# Patient Record
Sex: FEMALE | Race: WHITE | NOT HISPANIC OR LATINO | Employment: FULL TIME | URBAN - METROPOLITAN AREA
[De-identification: names, ages, dates, MRNs, and addresses within clinical notes are randomized per-mention and may not be internally consistent; named-entity substitution may affect disease eponyms.]

---

## 2023-02-20 LAB — PROLACTIN (UG/L) IN SER/PLAS: 19.1 UG/L (ref 3–20)

## 2023-03-15 LAB
CLUE CELLS: NORMAL
NUGENT SCORE: 0
YEAST: NORMAL

## 2023-04-19 DIAGNOSIS — F98.8 ATTENTION DEFICIT DISORDER, UNSPECIFIED HYPERACTIVITY PRESENCE: Primary | ICD-10-CM

## 2023-04-19 PROBLEM — I87.2 CHRONIC VENOUS INSUFFICIENCY: Status: ACTIVE | Noted: 2023-04-19

## 2023-04-19 PROBLEM — H93.A1 OBJECTIVE PULSATILE TINNITUS OF RIGHT EAR: Status: ACTIVE | Noted: 2023-04-19

## 2023-04-19 PROBLEM — F32.81 PREMENSTRUAL DYSPHORIC SYNDROME: Status: ACTIVE | Noted: 2023-04-19

## 2023-04-19 PROBLEM — L70.0 ACNE VULGARIS: Status: ACTIVE | Noted: 2023-04-19

## 2023-04-19 PROBLEM — G56.21 ULNAR NEUROPATHY AT ELBOW, RIGHT: Status: ACTIVE | Noted: 2023-04-19

## 2023-04-19 PROBLEM — R29.2 HYPER REFLEXIA: Status: ACTIVE | Noted: 2023-04-19

## 2023-04-19 PROBLEM — E06.3 HASHIMOTO'S THYROIDITIS: Status: ACTIVE | Noted: 2023-04-19

## 2023-04-19 PROBLEM — E28.8 HYPERANDROGENISM: Status: ACTIVE | Noted: 2023-04-19

## 2023-04-19 PROBLEM — N92.6 IRREGULAR MENSES: Status: ACTIVE | Noted: 2023-04-19

## 2023-04-19 PROBLEM — R55 SYNCOPE, NEAR: Status: ACTIVE | Noted: 2023-04-19

## 2023-04-19 PROBLEM — J45.20 MILD INTERMITTENT ASTHMA WITHOUT COMPLICATION (HHS-HCC): Status: ACTIVE | Noted: 2023-04-19

## 2023-04-19 PROBLEM — E55.9 VITAMIN D DEFICIENCY: Status: ACTIVE | Noted: 2023-04-19

## 2023-04-19 PROBLEM — R73.03 PREDIABETES: Status: ACTIVE | Noted: 2023-04-19

## 2023-04-19 PROBLEM — M06.09 POLYARTHRITIS WITH NEGATIVE RHEUMATOID FACTOR (MULTI): Status: ACTIVE | Noted: 2023-04-19

## 2023-04-19 PROBLEM — M79.18 MYOFASCIAL PAIN: Status: ACTIVE | Noted: 2023-04-19

## 2023-04-19 PROBLEM — K21.9 GERD (GASTROESOPHAGEAL REFLUX DISEASE): Status: ACTIVE | Noted: 2023-04-19

## 2023-04-19 PROBLEM — H16.229 KERATOCONJUNCTIVITIS SICCA NOT SPECIFIED AS SJOGREN'S: Status: ACTIVE | Noted: 2023-04-19

## 2023-04-19 PROBLEM — R43.9 DISTURBANCE OF SMELL AND TASTE: Status: ACTIVE | Noted: 2023-04-19

## 2023-04-19 PROBLEM — D89.89 AUTOIMMUNE DISORDER (MULTI): Status: ACTIVE | Noted: 2023-04-19

## 2023-04-19 PROBLEM — M48.02 CERVICAL STENOSIS OF SPINE: Status: ACTIVE | Noted: 2023-04-19

## 2023-04-19 PROBLEM — M24.9 HYPERMOBILE JOINTS: Status: ACTIVE | Noted: 2023-04-19

## 2023-04-19 PROBLEM — R79.82 CRP ELEVATED: Status: ACTIVE | Noted: 2023-04-19

## 2023-04-19 PROBLEM — E04.9 ENLARGED THYROID: Status: ACTIVE | Noted: 2023-04-19

## 2023-04-19 PROBLEM — A60.00 GENITAL HERPES SIMPLEX TYPE 2: Status: ACTIVE | Noted: 2023-04-19

## 2023-04-19 PROBLEM — M25.562 LEFT KNEE PAIN: Status: ACTIVE | Noted: 2023-04-19

## 2023-04-19 PROBLEM — J30.9 ALLERGIC RHINITIS: Status: ACTIVE | Noted: 2023-04-19

## 2023-04-19 PROBLEM — R20.3 SENSITIVE SKIN: Status: ACTIVE | Noted: 2023-04-19

## 2023-04-19 PROBLEM — K58.9 IBS (IRRITABLE BOWEL SYNDROME): Status: ACTIVE | Noted: 2023-04-19

## 2023-04-19 PROBLEM — M35.9 UNDIFFERENTIATED CONNECTIVE TISSUE DISEASE (MULTI): Status: ACTIVE | Noted: 2023-04-19

## 2023-04-19 PROBLEM — I49.9 IRREGULAR HEART BEATS: Status: ACTIVE | Noted: 2023-04-19

## 2023-04-19 PROBLEM — Q79.62 HYPERMOBILE EHLERS-DANLOS SYNDROME (HHS-HCC): Status: ACTIVE | Noted: 2023-04-19

## 2023-04-19 PROBLEM — L68.0 HIRSUTISM: Status: ACTIVE | Noted: 2023-04-19

## 2023-04-19 PROBLEM — H52.13 BILATERAL MYOPIA: Status: ACTIVE | Noted: 2023-04-19

## 2023-04-19 PROBLEM — R29.2: Status: ACTIVE | Noted: 2023-04-19

## 2023-04-19 PROBLEM — Z15.89 MONOALLELIC MUTATION OF MEFV GENE: Status: ACTIVE | Noted: 2023-04-19

## 2023-04-19 PROBLEM — L81.9 DISCOLORATION OF SKIN OF HAND: Status: ACTIVE | Noted: 2023-04-19

## 2023-04-19 PROBLEM — J45.909 ASTHMA (HHS-HCC): Status: ACTIVE | Noted: 2023-04-19

## 2023-04-19 PROBLEM — F44.4 FUNCTIONAL NEUROLOGICAL SYMPTOM DISORDER WITH WEAKNESS OR PARALYSIS: Status: ACTIVE | Noted: 2023-04-19

## 2023-04-19 PROBLEM — G56.01 RIGHT CARPAL TUNNEL SYNDROME: Status: ACTIVE | Noted: 2023-04-19

## 2023-04-19 RX ORDER — SULFASALAZINE 500 MG/1
1 TABLET ORAL 2 TIMES DAILY
COMMUNITY
Start: 2022-12-15 | End: 2024-01-16

## 2023-04-19 RX ORDER — CHOLECALCIFEROL (VITAMIN D3) 50 MCG
2000 TABLET ORAL DAILY
COMMUNITY

## 2023-04-19 RX ORDER — CELECOXIB 400 MG/1
400 CAPSULE ORAL
COMMUNITY

## 2023-04-19 RX ORDER — DEXTROAMPHETAMINE SACCHARATE, AMPHETAMINE ASPARTATE, DEXTROAMPHETAMINE SULFATE AND AMPHETAMINE SULFATE 3.75; 3.75; 3.75; 3.75 MG/1; MG/1; MG/1; MG/1
1 TABLET ORAL DAILY
Qty: 30 TABLET | Refills: 0 | Status: SHIPPED | OUTPATIENT
Start: 2023-04-19 | End: 2023-05-19

## 2023-04-19 RX ORDER — LEVOTHYROXINE SODIUM 75 UG/1
75 TABLET ORAL DAILY
COMMUNITY
End: 2023-11-09 | Stop reason: SDUPTHER

## 2023-04-19 RX ORDER — VALACYCLOVIR HYDROCHLORIDE 500 MG/1
1 TABLET, FILM COATED ORAL 2 TIMES DAILY PRN
COMMUNITY
Start: 2021-04-05

## 2023-04-19 RX ORDER — DEXTROAMPHETAMINE SACCHARATE, AMPHETAMINE ASPARTATE, DEXTROAMPHETAMINE SULFATE AND AMPHETAMINE SULFATE 3.75; 3.75; 3.75; 3.75 MG/1; MG/1; MG/1; MG/1
1 TABLET ORAL DAILY
COMMUNITY
Start: 2023-02-07 | End: 2023-04-19 | Stop reason: SDUPTHER

## 2023-04-19 NOTE — PROGRESS NOTES
Patient called asking for refill on medication(s).  Patient is requesting the following refill(s):  Adderall 15 mg daily   OARRS was reviewed on:  4/19/2023  Urine Drug Screen completed on  12/15/2022;  results were as expected.   C/S Agreement UTD    Refill sent to pharmacy on file

## 2023-04-25 ENCOUNTER — LAB (OUTPATIENT)
Dept: LAB | Facility: LAB | Age: 38
End: 2023-04-25
Payer: COMMERCIAL

## 2023-04-26 LAB — TOBACCO SCREEN, URINE: NEGATIVE

## 2023-04-27 ENCOUNTER — OFFICE VISIT (OUTPATIENT)
Dept: PRIMARY CARE | Facility: CLINIC | Age: 38
End: 2023-04-27
Payer: COMMERCIAL

## 2023-04-27 VITALS
WEIGHT: 158 LBS | OXYGEN SATURATION: 98 % | HEART RATE: 81 BPM | DIASTOLIC BLOOD PRESSURE: 77 MMHG | SYSTOLIC BLOOD PRESSURE: 117 MMHG | BODY MASS INDEX: 30.86 KG/M2

## 2023-04-27 DIAGNOSIS — F90.9 ATTENTION DEFICIT HYPERACTIVITY DISORDER (ADHD), UNSPECIFIED ADHD TYPE: ICD-10-CM

## 2023-04-27 DIAGNOSIS — Q79.62 HYPERMOBILE EHLERS-DANLOS SYNDROME (HHS-HCC): ICD-10-CM

## 2023-04-27 DIAGNOSIS — K21.9 GASTROESOPHAGEAL REFLUX DISEASE WITHOUT ESOPHAGITIS: ICD-10-CM

## 2023-04-27 DIAGNOSIS — Z00.00 ANNUAL PHYSICAL EXAM: Primary | ICD-10-CM

## 2023-04-27 DIAGNOSIS — Z79.899 HIGH RISK MEDICATION USE: ICD-10-CM

## 2023-04-27 DIAGNOSIS — R29.2 HYPER REFLEXIA: ICD-10-CM

## 2023-04-27 PROBLEM — Z86.39 HISTORY OF HASHIMOTO THYROIDITIS: Status: ACTIVE | Noted: 2022-02-23

## 2023-04-27 PROBLEM — J30.2 SEASONAL ALLERGIES: Status: ACTIVE | Noted: 2023-04-27

## 2023-04-27 PROBLEM — H93.A1 PULSATILE TINNITUS OF RIGHT EAR: Status: ACTIVE | Noted: 2023-04-27

## 2023-04-27 PROBLEM — N89.8 VAGINAL DISCHARGE: Status: ACTIVE | Noted: 2023-04-27

## 2023-04-27 PROBLEM — K64.9 HEMORRHOIDS: Status: ACTIVE | Noted: 2023-04-27

## 2023-04-27 PROCEDURE — 80324 DRUG SCREEN AMPHETAMINES 1/2: CPT

## 2023-04-27 PROCEDURE — 1036F TOBACCO NON-USER: CPT | Performed by: NURSE PRACTITIONER

## 2023-04-27 PROCEDURE — 80307 DRUG TEST PRSMV CHEM ANLYZR: CPT

## 2023-04-27 PROCEDURE — 99213 OFFICE O/P EST LOW 20 MIN: CPT | Performed by: NURSE PRACTITIONER

## 2023-04-27 PROCEDURE — 99395 PREV VISIT EST AGE 18-39: CPT | Performed by: NURSE PRACTITIONER

## 2023-04-27 RX ORDER — CERTOLIZUMAB PEGOL 200 MG/ML
400 INJECTION, SOLUTION SUBCUTANEOUS
COMMUNITY
End: 2024-03-14 | Stop reason: SDUPTHER

## 2023-04-27 RX ORDER — METHOCARBAMOL 500 MG/1
TABLET, FILM COATED ORAL
COMMUNITY
Start: 2023-01-03

## 2023-04-27 NOTE — PATIENT INSTRUCTIONS
Thank you for seeing me today.  It was a pleasure to see you again!    Today we did your Annual Physical Exam and discussed the following:     UDS today    CSA updated today    Continue all medications    RTC AS NEEDED

## 2023-04-27 NOTE — PROGRESS NOTES
"Subjective   Chief Complaint   Patient presents with    Follow-up     ELMA IS HERE TODAY FOR ROUTINE F/U AND MEDICATION COMPLIANCE.        Patient ID: Elma Cleveland is a 37 y.o. female who presents for Follow-up (ELMA IS HERE TODAY FOR ROUTINE F/U AND MEDICATION COMPLIANCE. )    HPI  Est 36 yo female presents today for 3 mo f/u on ADD and CPE     Pt currently taking Adderall 15 mg daily (felt 20 mg was too much)   UDS due today  CSA updated today online     Pt went GYN last month  Was cleared by \"high \"    Pt will be doing labs for Endo next month    Pt seeing Dr. Zuleta, had injection 2 days ago     Pt seeing neurology,     Review of Systems  All 13 systems were reviewed and are within normal limits except positive and pertinent negative responses which are noted below or in HPI.      Objective   /77   Pulse 81   Wt 71.7 kg (158 lb)   SpO2 98%   BMI 30.86 kg/m²        Physical Exam  Vitals reviewed.   HENT:      Mouth/Throat:      Mouth: Mucous membranes are moist.   Eyes:      Conjunctiva/sclera: Conjunctivae normal.   Cardiovascular:      Pulses: Normal pulses.   Pulmonary:      Effort: Pulmonary effort is normal.   Abdominal:      General: Bowel sounds are normal.   Skin:     General: Skin is warm and dry.      Capillary Refill: Capillary refill takes less than 2 seconds.   Neurological:      Mental Status: She is alert.   Psychiatric:         Mood and Affect: Mood normal.         Assessment/Plan   Problem List Items Addressed This Visit          Other    ADD (attention deficit disorder) - Primary    Relevant Orders    Drug Screen, Urine With Reflex to Confirmation     Other Visit Diagnoses       High risk medication use        Relevant Orders    Amphetamine Confirm, Urine    Drug Screen, Urine With Reflex to Confirmation            "

## 2023-04-28 LAB
AMPHETAMINE (PRESENCE) IN URINE BY SCREEN METHOD: ABNORMAL
BARBITURATES PRESENCE IN URINE BY SCREEN METHOD: ABNORMAL
BENZODIAZEPINE (PRESENCE) IN URINE BY SCREEN METHOD: ABNORMAL
CANNABINOIDS IN URINE BY SCREEN METHOD: ABNORMAL
COCAINE (PRESENCE) IN URINE BY SCREEN METHOD: ABNORMAL
DRUG SCREEN COMMENT URINE: ABNORMAL
FENTANYL URINE: ABNORMAL
METHADONE (PRESENCE) IN URINE BY SCREEN METHOD: ABNORMAL
OPIATES (PRESENCE) IN URINE BY SCREEN METHOD: ABNORMAL
OXYCODONE (PRESENCE) IN URINE BY SCREEN METHOD: ABNORMAL
PHENCYCLIDINE (PRESENCE) IN URINE BY SCREEN METHOD: ABNORMAL

## 2023-05-02 LAB
AMPHETAMINES,URINE: 861 NG/ML
MDA,URINE: <200 NG/ML
MDEA,URINE: <200 NG/ML
MDMA,UR: <200 NG/ML
METHAMPHETAMINE QUANTITATIVE URINE: <200 NG/ML
PHENTERMINE,UR: <200 NG/ML

## 2023-08-30 PROBLEM — J45.909 ASTHMA (HHS-HCC): Status: ACTIVE | Noted: 2023-08-30

## 2023-08-30 PROBLEM — N92.0 MENORRHAGIA: Status: ACTIVE | Noted: 2023-08-30

## 2023-08-30 PROBLEM — M06.09 RHEUMATOID ARTHRITIS WITHOUT RHEUMATOID FACTOR, MULTIPLE SITES (MULTI): Status: ACTIVE | Noted: 2023-08-30

## 2023-08-30 PROBLEM — O09.529 ANTEPARTUM MULTIGRAVIDA OF ADVANCED MATERNAL AGE (HHS-HCC): Status: ACTIVE | Noted: 2023-08-30

## 2023-08-30 PROBLEM — K40.31: Status: ACTIVE | Noted: 2023-08-30

## 2023-08-30 PROBLEM — M35.9 CONNECTIVE TISSUE DISEASE (MULTI): Status: ACTIVE | Noted: 2023-08-30

## 2023-08-30 RX ORDER — HYDROXYZINE HYDROCHLORIDE 25 MG/1
1 TABLET, FILM COATED ORAL 3 TIMES DAILY PRN
COMMUNITY
Start: 2021-01-19

## 2023-08-30 RX ORDER — CYCLOBENZAPRINE HCL 10 MG
1 TABLET ORAL NIGHTLY
COMMUNITY
Start: 2023-06-06

## 2023-08-30 RX ORDER — THYROID 30 MG/1
30 TABLET ORAL
COMMUNITY

## 2023-08-30 RX ORDER — ATOMOXETINE 18 MG/1
CAPSULE ORAL
COMMUNITY
Start: 2023-06-12 | End: 2023-11-09 | Stop reason: ALTCHOICE

## 2023-08-30 RX ORDER — CELECOXIB 200 MG/1
CAPSULE ORAL
COMMUNITY
End: 2023-11-09

## 2023-08-30 RX ORDER — SPIRONOLACTONE 100 MG/1
100 TABLET, FILM COATED ORAL
COMMUNITY
End: 2023-11-09 | Stop reason: ALTCHOICE

## 2023-08-30 RX ORDER — ERGOCALCIFEROL 1.25 MG/1
1 CAPSULE ORAL
COMMUNITY
Start: 2022-08-15 | End: 2023-11-09 | Stop reason: SDUPTHER

## 2023-08-30 RX ORDER — METOPROLOL SUCCINATE 25 MG/1
25 TABLET, EXTENDED RELEASE ORAL DAILY
COMMUNITY
Start: 2023-07-07 | End: 2023-10-05

## 2023-08-30 RX ORDER — THYROID 15 MG/1
15 TABLET ORAL
COMMUNITY
Start: 2023-07-07 | End: 2023-10-05

## 2023-08-30 RX ORDER — DEXTROAMPHETAMINE SACCHARATE, AMPHETAMINE ASPARTATE MONOHYDRATE, DEXTROAMPHETAMINE SULFATE AND AMPHETAMINE SULFATE 5; 5; 5; 5 MG/1; MG/1; MG/1; MG/1
1 CAPSULE, EXTENDED RELEASE ORAL EVERY MORNING
COMMUNITY
Start: 2022-08-17 | End: 2023-11-09 | Stop reason: ALTCHOICE

## 2023-08-30 RX ORDER — MONTELUKAST SODIUM 10 MG/1
1 TABLET ORAL DAILY
COMMUNITY
End: 2023-11-09 | Stop reason: ALTCHOICE

## 2023-08-30 RX ORDER — MOMETASONE FUROATE 50 UG/1
2 SPRAY, METERED NASAL DAILY
COMMUNITY
End: 2023-11-09 | Stop reason: ALTCHOICE

## 2023-08-30 RX ORDER — LEVALBUTEROL TARTRATE 45 UG/1
2 AEROSOL, METERED ORAL EVERY 6 HOURS
COMMUNITY
Start: 2011-05-16

## 2023-08-30 RX ORDER — ACYCLOVIR 50 MG/G
1 OINTMENT TOPICAL
COMMUNITY

## 2023-08-30 RX ORDER — MOMETASONE FUROATE 50 UG/1
SPRAY, METERED NASAL
COMMUNITY
Start: 2019-09-30

## 2023-08-30 RX ORDER — CETIRIZINE HYDROCHLORIDE 10 MG/1
TABLET ORAL
COMMUNITY
Start: 2023-06-05

## 2023-08-30 RX ORDER — FAMOTIDINE 10 MG/1
TABLET ORAL
COMMUNITY
Start: 2023-06-05

## 2023-08-30 RX ORDER — DEXTROAMPHETAMINE SACCHARATE, AMPHETAMINE ASPARTATE MONOHYDRATE, DEXTROAMPHETAMINE SULFATE AND AMPHETAMINE SULFATE 2.5; 2.5; 2.5; 2.5 MG/1; MG/1; MG/1; MG/1
1 CAPSULE, EXTENDED RELEASE ORAL EVERY MORNING
COMMUNITY
End: 2023-11-09 | Stop reason: ALTCHOICE

## 2023-09-29 ENCOUNTER — HOSPITAL ENCOUNTER (OUTPATIENT)
Dept: DATA CONVERSION | Facility: HOSPITAL | Age: 38
Discharge: HOME | End: 2023-09-29
Payer: COMMERCIAL

## 2023-09-29 DIAGNOSIS — S83.232A COMPLEX TEAR OF MEDIAL MENISCUS, CURRENT INJURY, LEFT KNEE, INITIAL ENCOUNTER: ICD-10-CM

## 2023-10-12 ENCOUNTER — LAB (OUTPATIENT)
Dept: LAB | Facility: LAB | Age: 38
End: 2023-10-12
Payer: COMMERCIAL

## 2023-10-12 DIAGNOSIS — E55.9 VITAMIN D DEFICIENCY, UNSPECIFIED: ICD-10-CM

## 2023-10-12 DIAGNOSIS — R76.8 OTHER SPECIFIED ABNORMAL IMMUNOLOGICAL FINDINGS IN SERUM: ICD-10-CM

## 2023-10-12 DIAGNOSIS — Z00.00 ANNUAL PHYSICAL EXAM: ICD-10-CM

## 2023-10-12 DIAGNOSIS — M06.09 RHEUMATOID ARTHRITIS WITHOUT RHEUMATOID FACTOR, MULTIPLE SITES (MULTI): ICD-10-CM

## 2023-10-12 DIAGNOSIS — Z79.899 OTHER LONG TERM (CURRENT) DRUG THERAPY: Primary | ICD-10-CM

## 2023-10-13 ENCOUNTER — LAB (OUTPATIENT)
Dept: LAB | Facility: LAB | Age: 38
End: 2023-10-13
Payer: COMMERCIAL

## 2023-10-13 PROCEDURE — 81003 URINALYSIS AUTO W/O SCOPE: CPT

## 2023-10-13 PROCEDURE — 86160 COMPLEMENT ANTIGEN: CPT

## 2023-10-13 PROCEDURE — 82550 ASSAY OF CK (CPK): CPT

## 2023-10-13 PROCEDURE — 80053 COMPREHEN METABOLIC PANEL: CPT

## 2023-10-13 PROCEDURE — 36415 COLL VENOUS BLD VENIPUNCTURE: CPT

## 2023-10-13 PROCEDURE — 86256 FLUORESCENT ANTIBODY TITER: CPT

## 2023-10-13 PROCEDURE — 86140 C-REACTIVE PROTEIN: CPT

## 2023-10-13 PROCEDURE — 85025 COMPLETE CBC W/AUTO DIFF WBC: CPT

## 2023-10-13 PROCEDURE — 85652 RBC SED RATE AUTOMATED: CPT

## 2023-10-14 LAB
ALBUMIN SERPL BCP-MCNC: 4.3 G/DL (ref 3.4–5)
ALP SERPL-CCNC: 54 U/L (ref 33–110)
ALT SERPL W P-5'-P-CCNC: 8 U/L (ref 7–45)
ANION GAP SERPL CALC-SCNC: 12 MMOL/L (ref 10–20)
APPEARANCE UR: CLEAR
AST SERPL W P-5'-P-CCNC: 11 U/L (ref 9–39)
BASOPHILS # BLD AUTO: 0.05 X10*3/UL (ref 0–0.1)
BASOPHILS NFR BLD AUTO: 0.8 %
BILIRUB SERPL-MCNC: 0.4 MG/DL (ref 0–1.2)
BILIRUB UR STRIP.AUTO-MCNC: NEGATIVE MG/DL
BUN SERPL-MCNC: 16 MG/DL (ref 6–23)
C3 SERPL-MCNC: 120 MG/DL (ref 87–200)
C4 SERPL-MCNC: 25 MG/DL (ref 10–50)
CALCIUM SERPL-MCNC: 9.7 MG/DL (ref 8.6–10.6)
CHLORIDE SERPL-SCNC: 102 MMOL/L (ref 98–107)
CK SERPL-CCNC: 48 U/L (ref 0–215)
CO2 SERPL-SCNC: 28 MMOL/L (ref 21–32)
COLOR UR: YELLOW
CREAT SERPL-MCNC: 0.9 MG/DL (ref 0.5–1.05)
CRP SERPL-MCNC: 0.39 MG/DL
EOSINOPHIL # BLD AUTO: 0.04 X10*3/UL (ref 0–0.7)
EOSINOPHIL NFR BLD AUTO: 0.6 %
ERYTHROCYTE [DISTWIDTH] IN BLOOD BY AUTOMATED COUNT: 12.1 % (ref 11.5–14.5)
ERYTHROCYTE [SEDIMENTATION RATE] IN BLOOD BY WESTERGREN METHOD: <1 MM/H (ref 0–20)
GFR SERPL CREATININE-BSD FRML MDRD: 85 ML/MIN/1.73M*2
GLUCOSE SERPL-MCNC: 81 MG/DL (ref 74–99)
GLUCOSE UR STRIP.AUTO-MCNC: NEGATIVE MG/DL
HCT VFR BLD AUTO: 42.1 % (ref 36–46)
HGB BLD-MCNC: 13.4 G/DL (ref 12–16)
IMM GRANULOCYTES # BLD AUTO: 0.02 X10*3/UL (ref 0–0.7)
IMM GRANULOCYTES NFR BLD AUTO: 0.3 % (ref 0–0.9)
KETONES UR STRIP.AUTO-MCNC: NEGATIVE MG/DL
LEUKOCYTE ESTERASE UR QL STRIP.AUTO: NEGATIVE
LYMPHOCYTES # BLD AUTO: 2.02 X10*3/UL (ref 1.2–4.8)
LYMPHOCYTES NFR BLD AUTO: 31.8 %
MCH RBC QN AUTO: 32.4 PG (ref 26–34)
MCHC RBC AUTO-ENTMCNC: 31.8 G/DL (ref 32–36)
MCV RBC AUTO: 102 FL (ref 80–100)
MONOCYTES # BLD AUTO: 0.69 X10*3/UL (ref 0.1–1)
MONOCYTES NFR BLD AUTO: 10.9 %
NEUTROPHILS # BLD AUTO: 3.53 X10*3/UL (ref 1.2–7.7)
NEUTROPHILS NFR BLD AUTO: 55.6 %
NITRITE UR QL STRIP.AUTO: NEGATIVE
NRBC BLD-RTO: 0 /100 WBCS (ref 0–0)
PH UR STRIP.AUTO: 6 [PH]
PLATELET # BLD AUTO: 225 X10*3/UL (ref 150–450)
PMV BLD AUTO: 11 FL (ref 7.5–11.5)
POTASSIUM SERPL-SCNC: 3.8 MMOL/L (ref 3.5–5.3)
PROT SERPL-MCNC: 6.6 G/DL (ref 6.4–8.2)
PROT UR STRIP.AUTO-MCNC: NEGATIVE MG/DL
RBC # BLD AUTO: 4.13 X10*6/UL (ref 4–5.2)
RBC # UR STRIP.AUTO: NEGATIVE /UL
SODIUM SERPL-SCNC: 138 MMOL/L (ref 136–145)
SP GR UR STRIP.AUTO: 1.01
UROBILINOGEN UR STRIP.AUTO-MCNC: <2 MG/DL
WBC # BLD AUTO: 6.4 X10*3/UL (ref 4.4–11.3)

## 2023-10-19 LAB — SCAN RESULT: NORMAL

## 2023-10-26 ENCOUNTER — SPECIALTY PHARMACY (OUTPATIENT)
Dept: PHARMACY | Facility: CLINIC | Age: 38
End: 2023-10-26

## 2023-10-26 ENCOUNTER — PHARMACY VISIT (OUTPATIENT)
Dept: PHARMACY | Facility: CLINIC | Age: 38
End: 2023-10-26
Payer: COMMERCIAL

## 2023-10-26 PROCEDURE — RXMED WILLOW AMBULATORY MEDICATION CHARGE

## 2023-11-09 ENCOUNTER — OFFICE VISIT (OUTPATIENT)
Dept: RHEUMATOLOGY | Facility: CLINIC | Age: 38
End: 2023-11-09
Payer: COMMERCIAL

## 2023-11-09 VITALS
HEART RATE: 91 BPM | WEIGHT: 155.2 LBS | DIASTOLIC BLOOD PRESSURE: 62 MMHG | SYSTOLIC BLOOD PRESSURE: 112 MMHG | BODY MASS INDEX: 30.47 KG/M2 | HEIGHT: 60 IN

## 2023-11-09 DIAGNOSIS — L50.9 HIVES: ICD-10-CM

## 2023-11-09 DIAGNOSIS — Q79.62 HYPERMOBILE EHLERS-DANLOS SYNDROME (HHS-HCC): ICD-10-CM

## 2023-11-09 DIAGNOSIS — E55.9 VITAMIN D DEFICIENCY: ICD-10-CM

## 2023-11-09 DIAGNOSIS — M06.09 POLYARTHRITIS WITH NEGATIVE RHEUMATOID FACTOR (MULTI): Primary | ICD-10-CM

## 2023-11-09 DIAGNOSIS — E06.3 HASHIMOTO'S THYROIDITIS: ICD-10-CM

## 2023-11-09 PROCEDURE — 99214 OFFICE O/P EST MOD 30 MIN: CPT | Performed by: INTERNAL MEDICINE

## 2023-11-09 PROCEDURE — 1036F TOBACCO NON-USER: CPT | Performed by: INTERNAL MEDICINE

## 2023-11-09 RX ORDER — METHYLPREDNISOLONE 4 MG/1
TABLET ORAL
Qty: 21 TABLET | Refills: 0 | Status: SHIPPED | OUTPATIENT
Start: 2023-11-09 | End: 2024-03-14

## 2023-11-09 ASSESSMENT — ROUTINE ASSESSMENT OF PATIENT INDEX DATA (RAPID3)
WEIGHTED_TOTAL_SCORE: 2.67
FN_SCORE: 0
ON A SCALE OF ONE TO TEN, CONSIDERING ALL THE WAYS IN WHICH ILLNESS AND HEALTH CONDITIONS MAY AFFECT YOU AT THIS TIME, PLEASE INDICATE BELOW HOW YOU ARE DOING:: 4
SEVERITY_SCORE: 0
TURN_FAUCETS_OFF: WITHOUT ANY DIFFICULTY
IN_OUT_BED: WITHOUT ANY DIFFICULTY
IN_OUT_TRANSPORT: WITHOUT ANY DIFFICULTY
PARTIPATE_RECREATIONAL_ACTIVITIES: WITHOUT ANY DIFFICULTY
DRESS_YOURSELF: WITHOUT ANY DIFFICULTY
FEELINGS_ANXIETY_NERVOUS: WITHOUT ANY DIFFICULTY
WALK_KILOMETERS: WITHOUT ANY DIFFICULTY
LIFT_CUP_TO_MOUTH: WITHOUT ANY DIFFICULTY
ON A SCALE OF ONE TO TEN, HOW MUCH PAIN HAVE YOU HAD BECAUSE OF YOUR CONDITION OVER THE PAST WEEK?: 4
GOOD_NIGHTS_SLEEP: WITHOUT ANY DIFFICULTY
TOTAL RAPID3 SCORE: 8
WASH_DRY_BODY: WITHOUT ANY DIFFICULTY
FEELINGS_DEPRESSION: WITH MUCH DIFFICULTY
PICK_CLOTHES_OFF_FLOOR: WITHOUT ANY DIFFICULTY
ON A SCALE OF ONE TO TEN, CONSIDERING ALL THE WAYS IN WHICH ILLNESS AND HEALTH CONDITIONS MAY AFFECT YOU AT THIS TIME, PLEASE INDICATE BELOW HOW YOU ARE DOING:: 4
ON A SCALE OF ONE TO TEN, HOW MUCH PAIN HAVE YOU HAD BECAUSE OF YOUR CONDITION OVER THE PAST WEEK?: 4
SUM OF QUESTIONS A TO J: 0
WALK_FLAT_GROUND: WITHOUT ANY DIFFICULTY

## 2023-11-09 ASSESSMENT — ENCOUNTER SYMPTOMS
DEPRESSION: 1
LOSS OF SENSATION IN FEET: 0
OCCASIONAL FEELINGS OF UNSTEADINESS: 0

## 2023-11-09 ASSESSMENT — PAIN SCALES - GENERAL: PAINLEVEL_OUTOF10: 4

## 2023-11-09 ASSESSMENT — PATIENT HEALTH QUESTIONNAIRE - PHQ9: 2. FEELING DOWN, DEPRESSED OR HOPELESS: NOT AT ALL

## 2023-11-09 ASSESSMENT — PAIN - FUNCTIONAL ASSESSMENT: PAIN_FUNCTIONAL_ASSESSMENT: 0-10

## 2023-11-09 NOTE — PROGRESS NOTES
[facility]/  Rheumatology  [facility], [address]  [place] [address]  Phone: [phone]  Fax: [phone]    Rheumatology Progress Note 11/9/23    Elma Cleveland is a 38 y.o. female here for   Chief Complaint   Patient presents with    Rheumatoid Arthritis     Follow up with labs       Last Visit: 6/6/23    Rheum Hx  Referred by for: CRP ELEVATED/FOOT PAIN/ JOINT  PAIN/MALAISE AND FATIGUE/NUMBNESS  Patient has seen multiple rheumatologists including Dr. Crespo,  Dr. Caballero, and Dr. Mercado.  Per available records review Dr. Caballero diagnosed patient with  question of undifferentiated connective tissue disease. Patient has  a history of joint pains and high EVELIN at the age of 16 years old.  Found to have RNP slightly elevated. Negative HLA B27, RF, CCP,  Anca, CRP, uric acid, CBC, SPEP, LAC  Patient also saw Dr. Kumar who diagnosed her with chronic  pain, likely undifferentiated connective tissue disease with positive  EVELIN low RNP and Hashimoto's history  It looks a patient has been treated with non steroid antiinflammatory  drug and narcotics.  Patient apparently has also seen Dermatology but  unfortunately could not tolerate the Xyzal. She had a full body rash  with Plaquenil.  Chief complaint: Joint pain, muscle tightness  Since 16 years old  Slow onset  Intermittent course  Precipitating factors: None  Associated symptoms: Pain in joints, night sweats, low-grade  fevers, tiredness  Better: Aleve, sleep  Worse: Stress  Previous treatments naproxen-somewhat helpful  Ibuprofen-somewhat helpful  Meloxicam-no help  Diclofenac-no help  Celecoxib-no help  Acetaminophen-no help  Steroids-very helpful  Plaquenil-discontinued due to rash  Occupation: RN    Previous Rheum note reviewed. Dx with UCTD as a teenager. Low + EVELIN and RNP. Diagnosed by Dr Caballero. Mother Dx sith SLE. Then saw Dr Kumar and Dr Layne.  EVELIN 1:80 2016  Chronic urticaria/Multiple allergies/Asthma  LAD, s/p bx-  benign  PCOS  Benign joint hypermobility.   Hypothyroidism  Since 16 y/o after mono, everything snowballed from there.  Patient has had multiple to and including joint aches, infection  once a year bronchitis that will not go way. States nothing has been  done. 3 days full (not usually) body rash treated with steroids for  over a month.  Usual have rash is high pinpoint nonpruritic. Patient saw  allergist and told she is allergic to life. Patient takes Zyrtec for day  to help with the itchiness and rashes.  Does not have PCOS. Patient cannot take birth control has  makes her sick. Off Aldactone notes horrible acne and cystic acne.  Currently tenderness of bilateral elbows- left elbow is always  the worst, upper spine and lower spine, left knee and cas 3rd 4th  PIPs  No noted swelling  30 minutes to 1 hour of morning stiffness feels muscle  tightness that never seems to go away  Has had deep tissue massage 2-3/wk told very tight knots  She has never tried muscle relaxant or acupuncture.  History of right tennis elbow  Severe back pain more recently tightness lower back and hips  sometimes arms. Notes response in past to Synthroid.  Review of systems positive for:  Fatigue  LN >1 yr- biopsy normal per pt- ?reactive lymphoid  hyperplasia  Dry eyes, constant dry nose  Canker sores not as freq  R ear pulsatile tinnitus/MRA MRI unremarkable; ? scar tissue  +HSV  History of lung disease- Asthma cough variant, not well  controlled  Vocal cord dysfunction due to reflux- Pepcid has almost fixed  it; pantoprazole not working as well; to see GI  Alb incrased heart rate; on Xopenex and alb neb prn  Echo- slight pericardial thickening, ?elevated RVSP; hx of  vasovagal syncope from stomach pain  Gastroesophageal reflux disease  Abdominal pain/ prominent Peyer's patches/ no  gallbladder/dumping syndrome/ Bentyl or Levsin sometimes help  History of kidney stone  Rashes- hormonla; increased DHEAS; no adrenal hyperplasia;  cysts that  come and go; cortisol test done during her night shift  work  Hx of 3 L knee surgeries  Joint pain  Morning stiffness  Back pain that wakes her up from sleep approved upon getting  up and with activity and with rest  Myalgias  Hx of concussion  Weakness right foot- Saw Neuro and did PT up and down; MRI  spine showed herniated disc; EMP upper arm skin couldn't even  feel it.; nystagmus and tiredness  Frequent infections including bacterial vaginosis, trichomonas  History of thyroid disease- +TPO, very symptomatci in 2014  and at TSH level 5; will be following with Endo  Allergies  Depression  Component      Latest Ref Rng 5/22/2018 12/3/2019   Tissue Transglutaminase, IgA      0 - 14  <1     Tissue Transglutamase IgG      0 - 14  <1     DEAMIDATED GLIADIN PEPTIDE IGA      0 - 14  1     DEAMIDATED GLIADIN PEPTIDE IGG      0 - 14  <1     Mononucleosis Screen      NEGATIVE   NEGATIVE      Component      Latest Ref Rng 9/14/2020 12/8/2020   EVELIN      NEGATIVE  POSITIVE !     EVELIN Titer 1:80     EVELIN Pattern HOMOGENEOUS     Anti-SM      AI <0.2     Anti-RNP      AI 2.0 !     Anti-SM/RNP      AI <0.2     Anti-SSA      AI <0.2     Anti-SSB      AI <0.2     Anti-SCL-70      AI <0.2     Anti-RANDY-1 IgG      AI <0.2     Anti-Chromatin      AI <0.2     Anti-Centromere      AI <0.2     ANTI-RIBOSOMAL P      AI <0.2     Anti-DNA (DS)      IU/mL 1.0     C-ANCA (PR3) BY EIA  (Note)…    C-ANCA FLOURESCENCE  Negative…    P-ANCA (MPO) BY EIA  (Note)…    P-ANCA FLOURESCENCE  Negative…    ANCP INTERPRETATION  (Note)…    Staff Review  (Note)…    IgG Cardiolipin Ab  <9.0…    IgM Cardiolipin Ab  20.8… (H)    IgA Cardiolipin Ab  11.2…    Beta-2 Glyco 1 IgG  <9…    Beta 2 Glyco 1 IgM  <9…    Angiotensin 1 Conv  26…    Beta-2 Glyco 1 IgA  1…    Centromere Ab  Negative…      Component      Latest Ref Rng 3/17/2021 8/4/2022   IgG Cardiolipin Ab <9.0…     IgM Cardiolipin Ab 10.1…     IgA Cardiolipin Ab 12.4… (H)     PTT-LA  37.0…    DRVVT  37.1…     Lupus Anticoagulant Interpretation  No LAC   SSA ANTIBODIES <0.2…     SSB ANTIBODIES <0.2…          Previous Tx  naproxen-somewhat helpful  Ibuprofen-somewhat helpful  Meloxicam-no help  Diclofenac-no help  Celecoxib-no help  Acetaminophen-no help  Steroids-very helpful  Plaquenil-discontinued due to rash    Health Maintenance  DXA- none  Component      Latest Ref Rng 4/25/2019 12/3/2019 12/8/2020   Herpes Simplex I IgG <0.2…      HSV 1, IgG Negative…      Herpes Simplex 2 IgG <0.2…      HSV 2, IgG Negative…      HERPES SIMPLEX IGM 0.79…      HSV IgM Qualitative Negative…      Mononucleosis Screen      NEGATIVE   NEGATIVE     Hepatitis B Surface AG      NEG    NEGATIVE    Hepatitis C AB   Negative…    Lyme Antibodies Screen   Negative…    Malignancy Hx- none  Immunization History   Administered Date(s) Administered    AS03 Adjuvant 09/23/2021    Flu vaccine (IIV4), preservative free *Check age/dose* 09/15/2019, 10/01/2020    Flu vaccine, quadrivalent, high-dose, preservative free, age 65y+ (FLUZONE) 09/23/2021    Influenza, Unspecified 10/19/2011, 09/26/2012, 10/04/2013, 11/04/2014, 09/26/2017, 10/20/2022    Influenza, seasonal, injectable 10/22/2013, 10/30/2019    Influenza, trivalent, adjuvanted 10/14/2022    MMR vaccine, subcutaneous (MMR II) 09/01/1998    Moderna COVID-19 vaccine, bivalent, blue cap/gray label *Check age/dose* 11/12/2022    Moderna SARS-CoV-2 Vaccination 05/28/2021, 06/25/2021, 02/04/2022    PPD Test 12/25/2010, 04/04/2011    Tdap vaccine, age 7 year and older (BOOSTRIX) 09/16/2019          Past Medical History:   Diagnosis Date    Acute vaginitis 02/01/2020    Vaginitis and vulvovaginitis    Anesthesia of skin 10/03/2017    Right arm numbness    Carpal tunnel syndrome, left upper limb 12/20/2016    Left carpal tunnel syndrome    Concussion with loss of consciousness of unspecified duration, subsequent encounter     Concussion with loss of consciousness of unspecified duration, subsequent  encounter    Diarrhea, unspecified 11/08/2018    Acute diarrhea    Dry eye syndrome of bilateral lacrimal glands     Insufficiency of tear film of both eyes    Encounter for surveillance of contraceptive pills 04/25/2018    Encounter for birth control pills maintenance    Headache, unspecified 07/05/2019    Mixed headache    Hypo-osmolality and hyponatremia 04/05/2020    Hyponatremia    Immunization not carried out because of patient refusal 07/05/2019    Influenza vaccination declined    Left lower quadrant abdominal tenderness 08/22/2017    Abdominal left lower quadrant tenderness    Left lower quadrant pain 11/17/2016    Colicky LLQ abdominal pain    Other conditions influencing health status 08/22/2017    History of chronic diarrhea    Other intervertebral disc displacement, lumbar region     Herniated lumbar disc without myelopathy    Other malaise 09/10/2020    Malaise and fatigue    Other specified noninflammatory disorders of vagina     Vaginal lesion    Other symptoms and signs involving the musculoskeletal system 10/04/2018    Weakness of right foot    Other symptoms and signs involving the musculoskeletal system 09/28/2018    Weakness of right foot    Other symptoms and signs involving the musculoskeletal system 10/02/2018    Weakness of right lower extremity    Pain in right knee 04/25/2018    Right knee pain    Pain in unspecified foot 09/10/2020    Foot pain    Pelvic and perineal pain 06/05/2020    Pelvic pain    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    Personal history of diseases of the blood and blood-forming organs and certain disorders involving the immune mechanism     History of autoimmune disorder    Personal history of diseases of the skin and subcutaneous tissue 06/10/2016    History of allergic urticaria    Personal history of other benign neoplasm     History of adenoma of adrenal gland    Personal history of other diseases of the  circulatory system 10/25/2016    History of irregular heartbeat    Personal history of other diseases of the digestive system 07/05/2019    History of left inguinal hernia    Personal history of other diseases of the digestive system 02/28/2018    History of hemorrhoids    Personal history of other diseases of the female genital tract 05/22/2018    History of menorrhagia    Personal history of other diseases of the musculoskeletal system and connective tissue 09/02/2020    History of joint pain    Personal history of other diseases of the musculoskeletal system and connective tissue 09/10/2020    History of joint pain    Personal history of other diseases of the respiratory system 02/28/2018    History of acute sinusitis    Personal history of other diseases of the respiratory system     History of asthma    Personal history of other diseases of the respiratory system 12/21/2016    History of acute sinusitis    Personal history of other endocrine, nutritional and metabolic disease 04/06/2020    History of hyperkalemia    Personal history of other infectious and parasitic diseases     History of mononucleosis    Personal history of other specified conditions 07/05/2019    History of nausea    Personal history of other specified conditions 03/27/2019    History of palpitations    Personal history of other specified conditions 08/22/2017    History of urinary frequency    Personal history of other specified conditions 08/12/2021    History of dizziness    Personal history of other specified conditions 07/05/2019    History of headache    Personal history of other specified conditions 09/10/2020    History of numbness    Personal history of other specified conditions 12/30/2016    History of weight gain    Personal history of traumatic brain injury 06/04/2018    History of concussion    Rash and other nonspecific skin eruption 06/10/2016    Rash of unknown cause    Right upper quadrant pain 12/03/2019    Right upper  quadrant abdominal pain    Syncope     Syncope and collapse 02/28/2018    Vasovagal near syncope    Tremor, unspecified 12/19/2016    Tremor of right hand    Unspecified abdominal pain 12/12/2019    Abdominal pain of unknown etiology    Unspecified asthma with (acute) exacerbation 02/28/2018    Acute asthma exacerbation    Unspecified symptoms and signs involving the genitourinary system 12/05/2019    UTI symptoms      Past Surgical History:   Procedure Laterality Date    ADENOIDECTOMY  10/24/2016    Adenoidectomy    GALLBLADDER SURGERY  06/10/2016    Gallbladder Surgery    HERNIA REPAIR  06/10/2016    Hernia Repair    KNEE SURGERY Left 06/10/2016    2001, 2007, 2008    OTHER SURGICAL HISTORY  10/24/2016    Deep Cervical Lymph Node Biopsy    TONSILLECTOMY  10/24/2016    Tonsillectomy      Current Outpatient Medications   Medication Sig Dispense Refill    acyclovir (Zovirax) 5 % ointment 1 Application.      celecoxib (CeleBREX) 400 mg capsule Take 1 capsule (400 mg) by mouth once daily with a meal.      certolizumab pegol (Cimzia) injection Inject 2 mL (400 mg) under the skin every 28 (twenty-eight) days.      cetirizine (ZyrTEC) 10 mg tablet 1 (one) time each day at the same time.      cholecalciferol (Vitamin D-3) 50 MCG (2000 UT) tablet Take 1 tablet (2,000 Units) by mouth once daily.      cyclobenzaprine (Flexeril) 10 mg tablet Take 1 tablet (10 mg) by mouth once daily at bedtime.      famotidine (Pepcid) 10 mg tablet       hydrOXYzine HCL (Atarax) 25 mg tablet Take 1 tablet (25 mg) by mouth 3 times a day as needed for itching.      levalbuterol (Xopenex HFA) 45 mcg/actuation inhaler Inhale 2 puffs every 6 hours.      methocarbamol (Robaxin) 500 mg tablet Take by mouth.      mometasone (Nasonex) 50 mcg/actuation nasal spray Administer into affected nostril(s).      sulfaSALAzine (Azulfidine) 500 mg tablet Take 1 tablet (500 mg) by mouth 2 times a day.      thyroid, pork, (Whick) 30 mg tablet Take 1 tablet (30  mg) by mouth once daily. Take 30 mg by mouth in the morning. Take with 15mg tab daily.      valACYclovir (Valtrex) 500 mg tablet Take 1 tablet (500 mg) by mouth 2 times a day as needed (OUTBREAK).      amphetamine-dextroamphetamine (Adderall) 15 mg tablet Take 1 tablet (15 mg) by mouth once daily. 30 tablet 0    methylPREDNISolone (Medrol Dospak) 4 mg tablets Follow schedule on package instructions 21 tablet 0    metoprolol succinate XL (Toprol-XL) 25 mg 24 hr tablet Take 1 tablet (25 mg) by mouth early in the morning.. Do not crush or chew..      thyroid, pork, 15 mg tablet Take 1 tablet (15 mg) by mouth once daily.  Take 1 tablet (15 mg) by mouth in the morning. Take with the 30 mg tab.       No current facility-administered medications for this visit.      Allergies   Allergen Reactions    Ciprofloxacin Other     Reaction: Vomiting    Hydroxychloroquine Rash     Full body rash    Oxycodone-Acetaminophen Itching     full body itching    Tuberculin Ppd Unknown and Rash     ALLERGY TO BOTH APLISOL AND TUBERSOL CAUSING REDNESS, RASH AND IRRITATION UNDER SKIN.  ANNUAL SCREENING IS A TB GOLD.    Codeine Hives    Gum Wehwno-Wxxvph-Womo-Alcohol Unknown    Adhesive Tape-Silicones Rash    Erythromycin Diarrhea     Patient states she has had zpak and no reaction    Gold Keratinate Hives and Rash    Gold Sodium Thiosulfate Rash    Lidocaine Hives and Rash    Linalool Hives and Rash    Nickel Hives and Rash    Sodium Laureth Sulfate Unknown and Rash    Thimerosal Hives and Rash    Yellow Dye Hives and Rash        Vitals:    11/09/23 0924   BP: 112/62   Pulse: 91   Weight: 70.4 kg (155 lb 3.2 oz)   Height: 1.524 m (5')     Pain Assessment Pain Score: 4, Pain Location: Elbow (hands)     Rapid 3  Function Score (FN): 0  Pain Score (PN) (0-10): 4  Patient Global (PTGL) (0-10): 4  Rapid3 Score: 8      Workup  Component      Latest Ref Rng 10/13/2023   WBC      4.4 - 11.3 x10*3/uL 6.4    nRBC      0.0 - 0.0 /100 WBCs 0.0    RBC       4.00 - 5.20 x10*6/uL 4.13    HEMOGLOBIN      12.0 - 16.0 g/dL 13.4    HEMATOCRIT      36.0 - 46.0 % 42.1    MCV      80 - 100 fL 102 (H)    MCH      26.0 - 34.0 pg 32.4    MCHC      32.0 - 36.0 g/dL 31.8 (L)    RED CELL DISTRIBUTION WIDTH      11.5 - 14.5 % 12.1    Platelets      150 - 450 x10*3/uL 225    MEAN PLATELET VOLUME      7.5 - 11.5 fL 11.0    Neutrophils %      40.0 - 80.0 % 55.6    Immature Granulocytes %, Automated      0.0 - 0.9 % 0.3    Lymphocytes %      13.0 - 44.0 % 31.8    Monocytes %      2.0 - 10.0 % 10.9    Eosinophils %      0.0 - 6.0 % 0.6    Basophils %      0.0 - 2.0 % 0.8    Neutrophils Absolute      1.20 - 7.70 x10*3/uL 3.53    Immature Granulocytes Absolute, Automated      0.00 - 0.70 x10*3/uL 0.02    Lymphocytes Absolute      1.20 - 4.80 x10*3/uL 2.02    Monocytes Absolute      0.10 - 1.00 x10*3/uL 0.69    Eosinophils Absolute      0.00 - 0.70 x10*3/uL 0.04    Basophils Absolute      0.00 - 0.10 x10*3/uL 0.05    GLUCOSE      74 - 99 mg/dL 81    SODIUM      136 - 145 mmol/L 138    POTASSIUM      3.5 - 5.3 mmol/L 3.8    CHLORIDE      98 - 107 mmol/L 102    Bicarbonate      21 - 32 mmol/L 28    Anion Gap      10 - 20 mmol/L 12    Blood Urea Nitrogen      6 - 23 mg/dL 16    Creatinine      0.50 - 1.05 mg/dL 0.90    EGFR      >60 mL/min/1.73m*2 85    Calcium      8.6 - 10.6 mg/dL 9.7    Albumin      3.4 - 5.0 g/dL 4.3    Alkaline Phosphatase      33 - 110 U/L 54    Total Protein      6.4 - 8.2 g/dL 6.6    AST      9 - 39 U/L 11    Bilirubin Total      0.0 - 1.2 mg/dL 0.4    ALT      7 - 45 U/L 8    Color, Urine      Straw, Yellow  Yellow    Appearance, Urine      Clear  Clear    Specific Gravity, Urine      1.005 - 1.035  1.010    pH, Urine      5.0, 5.5, 6.0, 6.5, 7.0, 7.5, 8.0  6.0    Protein, Urine      NEGATIVE mg/dL NEGATIVE    Glucose, Urine      NEGATIVE mg/dL NEGATIVE    Blood, Urine      NEGATIVE  NEGATIVE    Ketones, Urine      NEGATIVE mg/dL NEGATIVE    Bilirubin, Urine       NEGATIVE  NEGATIVE    Urobilinogen, Urine      <2.0 mg/dL <2.0    Nitrite, Urine      NEGATIVE  NEGATIVE    Leukocyte Esterase, Urine      NEGATIVE  NEGATIVE    Sed Rate      0 - 20 mm/h <1    C4 Complement      10 - 50 mg/dL 25    Creatine Kinase      0 - 215 U/L 48    C3 Complement      87 - 200 mg/dL 120    C-Reactive Protein      <1.00 mg/dL 0.39         Assessment/Plan  1. Polyarthritis with negative rheumatoid factor (CMS/HCC)    2. Hypermobile Doug-Danlos syndrome    3. Hashimoto's thyroiditis    4. Hives    5. Vitamin D deficiency       Orders Placed This Encounter   Procedures    Comprehensive Metabolic Panel    CBC and Auto Differential    C-Reactive Protein    Creatine Kinase    Sedimentation Rate    Vitamin D 25-Hydroxy,Total (for eval of Vitamin D levels)    Tryptase      Since last appt, adherent and tolerating Cimzia 400 mg monthly and SSZ 1 g daily.  L knee issues since about 4 months. Swelling, tightness, warmth, clicking. Had MRI and saw Ortho and found to have inflammation. At the time also had widespread joint aches including jaw and muscle. As soon as took steroid, significantly improved and looking back this seems like a flare.  Takes Celebrex and Voltaren gen prn.  Overal, despite flare, doing well.  Rapid 3 consistent with near remission/low severity  Denies any recent or current infection.  ROS+ fatigue, GERD- on Pepcid again, rashes- hives- itchy on face. Tx with Zyrtec, Claritin, Benadryl topical and PO, joint pain, swelling, back pain, depression/anxiety.  Labs reviewed.  D/w pt tx options and to continue current regimen.  Celebrex, topical Voltaren and glucorticoids prn.   All questions answered.  Patient to follow up with primary care provider regarding all other medical issues not addressed today.     Genesis Bauer MD      Patient Care Team:  Margaret Gould DO as PCP - General (Family Medicine)  Ayo Butterfield MD as PCP - Employee [employer] PCP  Marisela Aguilar DO  as Primary Care Provider  Genesis Bauer MD as Consulting Physician (Rheumatology)

## 2023-11-15 ENCOUNTER — SPECIALTY PHARMACY (OUTPATIENT)
Dept: PHARMACY | Facility: CLINIC | Age: 38
End: 2023-11-15

## 2023-11-15 DIAGNOSIS — M06.09 RHEUMATOID ARTHRITIS WITHOUT RHEUMATOID FACTOR, MULTIPLE SITES (MULTI): Primary | ICD-10-CM

## 2023-11-17 ENCOUNTER — PHARMACY VISIT (OUTPATIENT)
Dept: PHARMACY | Facility: CLINIC | Age: 38
End: 2023-11-17
Payer: COMMERCIAL

## 2023-11-17 PROCEDURE — RXMED WILLOW AMBULATORY MEDICATION CHARGE

## 2023-11-17 RX ORDER — CERTOLIZUMAB PEGOL 200 MG/ML
INJECTION, SOLUTION SUBCUTANEOUS
Qty: 2 ML | Refills: 11 | Status: SHIPPED | OUTPATIENT
Start: 2023-11-17 | End: 2024-11-16

## 2023-12-12 ENCOUNTER — SPECIALTY PHARMACY (OUTPATIENT)
Dept: PHARMACY | Facility: CLINIC | Age: 38
End: 2023-12-12

## 2023-12-14 PROCEDURE — RXMED WILLOW AMBULATORY MEDICATION CHARGE

## 2023-12-18 ENCOUNTER — PHARMACY VISIT (OUTPATIENT)
Dept: PHARMACY | Facility: CLINIC | Age: 38
End: 2023-12-18
Payer: COMMERCIAL

## 2023-12-21 ENCOUNTER — SPECIALTY PHARMACY (OUTPATIENT)
Dept: PHARMACY | Facility: CLINIC | Age: 38
End: 2023-12-21

## 2023-12-21 ENCOUNTER — PHARMACY VISIT (OUTPATIENT)
Dept: PHARMACY | Facility: CLINIC | Age: 38
End: 2023-12-21
Payer: COMMERCIAL

## 2023-12-21 PROCEDURE — RXMED WILLOW AMBULATORY MEDICATION CHARGE

## 2024-01-12 ENCOUNTER — PATIENT MESSAGE (OUTPATIENT)
Dept: RHEUMATOLOGY | Facility: CLINIC | Age: 39
End: 2024-01-12
Payer: COMMERCIAL

## 2024-01-12 ENCOUNTER — LAB REQUISITION (OUTPATIENT)
Dept: LAB | Facility: LAB | Age: 39
End: 2024-01-12
Payer: COMMERCIAL

## 2024-01-12 DIAGNOSIS — U07.1 POSITIVE SELF-ADMINISTERED ANTIGEN TEST FOR COVID-19: ICD-10-CM

## 2024-01-12 LAB — SARS-COV-2 RNA RESP QL NAA+PROBE: DETECTED

## 2024-01-12 PROCEDURE — RXMED WILLOW AMBULATORY MEDICATION CHARGE

## 2024-01-12 PROCEDURE — 87635 SARS-COV-2 COVID-19 AMP PRB: CPT

## 2024-01-12 NOTE — TELEPHONE ENCOUNTER
From: Elma Cleveland  To: Genesis Bauer MD  Sent: 1/12/2024 10:02 AM EST  Subject: COVID    Hello,  My boyfriend tested positive for COVID two days ago. I was negative at the time but started showing symptoms this morning. I tested again, with an at home kit, and it is now positive. I am going to leave shortly to go get officially tested. Am I able to get a prescription for paxlovid?  Thank you,  Elma

## 2024-01-15 DIAGNOSIS — M06.09 RHEUMATOID ARTHRITIS WITHOUT RHEUMATOID FACTOR, MULTIPLE SITES (MULTI): ICD-10-CM

## 2024-01-16 ENCOUNTER — PHARMACY VISIT (OUTPATIENT)
Dept: PHARMACY | Facility: CLINIC | Age: 39
End: 2024-01-16
Payer: COMMERCIAL

## 2024-01-16 RX ORDER — SULFASALAZINE 500 MG/1
500 TABLET ORAL 2 TIMES DAILY
Qty: 180 TABLET | Refills: 3 | Status: SHIPPED | OUTPATIENT
Start: 2024-01-16 | End: 2024-03-14

## 2024-01-18 ENCOUNTER — HOSPITAL ENCOUNTER (OUTPATIENT)
Dept: RADIOLOGY | Facility: EXTERNAL LOCATION | Age: 39
Discharge: HOME | End: 2024-01-18

## 2024-01-18 DIAGNOSIS — R05.1 ACUTE COUGH: ICD-10-CM

## 2024-01-26 ENCOUNTER — TELEPHONE (OUTPATIENT)
Dept: RHEUMATOLOGY | Facility: CLINIC | Age: 39
End: 2024-01-26
Payer: COMMERCIAL

## 2024-01-26 DIAGNOSIS — B99.9 INFECTION: Primary | ICD-10-CM

## 2024-01-26 RX ORDER — AMOXICILLIN AND CLAVULANATE POTASSIUM 875; 125 MG/1; MG/1
875 TABLET, FILM COATED ORAL 2 TIMES DAILY
Qty: 20 TABLET | Refills: 0 | Status: SHIPPED | OUTPATIENT
Start: 2024-01-26 | End: 2024-02-05

## 2024-01-26 NOTE — TELEPHONE ENCOUNTER
Patient called stated it has been a little over 2 weeks since she has had covid and thinks she now has sinus infection having yellow drainge and a lot of facial pain. She is due for her injection but waiting to take because she is still not feeling well. Not on any antibiotic at this time. She wanted to know if she needs one and hold injection. Please advise

## 2024-01-26 NOTE — TELEPHONE ENCOUNTER
Hold injection for 1 week and to start once done with antibiotics and feeling better   Antibiotic sent. Hydrate well and have yogurt and/or probiotic daily. Vit C, D and zinc OTC may help.

## 2024-02-10 PROCEDURE — RXMED WILLOW AMBULATORY MEDICATION CHARGE

## 2024-02-16 ENCOUNTER — TELEPHONE (OUTPATIENT)
Dept: RHEUMATOLOGY | Facility: CLINIC | Age: 39
End: 2024-02-16
Payer: COMMERCIAL

## 2024-02-16 NOTE — TELEPHONE ENCOUNTER
"PT LEFT VM, HAD SINUSITIS & YOU GAVE HER AUGMENTIN FOR 10 DAY, AFTER 12 DAYS SXS RETURNED & PCP PUT HER ON 10 MORE DAYS OF DOXYCYCLINE (HAS 5 DAYS LEFT). C/O SEVERE BACK PAIN & ACHY JTS. HAS A \"PREDNISONE \" PACK. ASKING IF SHE CAN TAKE THIS WHILE ON THE DOXYCYCLINE?   "

## 2024-02-26 ENCOUNTER — PHARMACY VISIT (OUTPATIENT)
Dept: PHARMACY | Facility: CLINIC | Age: 39
End: 2024-02-26
Payer: COMMERCIAL

## 2024-02-26 ENCOUNTER — SPECIALTY PHARMACY (OUTPATIENT)
Dept: PHARMACY | Facility: CLINIC | Age: 39
End: 2024-02-26

## 2024-02-29 ENCOUNTER — LAB (OUTPATIENT)
Dept: LAB | Facility: LAB | Age: 39
End: 2024-02-29
Payer: COMMERCIAL

## 2024-02-29 DIAGNOSIS — M06.09 POLYARTHRITIS WITH NEGATIVE RHEUMATOID FACTOR (MULTI): ICD-10-CM

## 2024-02-29 DIAGNOSIS — E55.9 VITAMIN D DEFICIENCY: ICD-10-CM

## 2024-02-29 DIAGNOSIS — L50.9 HIVES: ICD-10-CM

## 2024-02-29 LAB
25(OH)D3 SERPL-MCNC: 31 NG/ML (ref 30–100)
ALBUMIN SERPL BCP-MCNC: 4.4 G/DL (ref 3.4–5)
ALP SERPL-CCNC: 58 U/L (ref 33–110)
ALT SERPL W P-5'-P-CCNC: 110 U/L (ref 7–45)
ANION GAP SERPL CALC-SCNC: 11 MMOL/L (ref 10–20)
AST SERPL W P-5'-P-CCNC: 77 U/L (ref 9–39)
BASOPHILS # BLD AUTO: 0.06 X10*3/UL (ref 0–0.1)
BASOPHILS NFR BLD AUTO: 1 %
BILIRUB SERPL-MCNC: 0.4 MG/DL (ref 0–1.2)
BUN SERPL-MCNC: 17 MG/DL (ref 6–23)
CALCIUM SERPL-MCNC: 9.7 MG/DL (ref 8.6–10.6)
CHLORIDE SERPL-SCNC: 101 MMOL/L (ref 98–107)
CK SERPL-CCNC: 48 U/L (ref 0–215)
CO2 SERPL-SCNC: 30 MMOL/L (ref 21–32)
CREAT SERPL-MCNC: 0.68 MG/DL (ref 0.5–1.05)
CRP SERPL-MCNC: 0.42 MG/DL
EGFRCR SERPLBLD CKD-EPI 2021: >90 ML/MIN/1.73M*2
EOSINOPHIL # BLD AUTO: 0.02 X10*3/UL (ref 0–0.7)
EOSINOPHIL NFR BLD AUTO: 0.3 %
ERYTHROCYTE [DISTWIDTH] IN BLOOD BY AUTOMATED COUNT: 12.5 % (ref 11.5–14.5)
ERYTHROCYTE [SEDIMENTATION RATE] IN BLOOD BY WESTERGREN METHOD: 4 MM/H (ref 0–20)
GLUCOSE SERPL-MCNC: 63 MG/DL (ref 74–99)
HCT VFR BLD AUTO: 46.1 % (ref 36–46)
HGB BLD-MCNC: 14.2 G/DL (ref 12–16)
IMM GRANULOCYTES # BLD AUTO: 0.02 X10*3/UL (ref 0–0.7)
IMM GRANULOCYTES NFR BLD AUTO: 0.3 % (ref 0–0.9)
LYMPHOCYTES # BLD AUTO: 1.97 X10*3/UL (ref 1.2–4.8)
LYMPHOCYTES NFR BLD AUTO: 31.7 %
MCH RBC QN AUTO: 29.8 PG (ref 26–34)
MCHC RBC AUTO-ENTMCNC: 30.8 G/DL (ref 32–36)
MCV RBC AUTO: 97 FL (ref 80–100)
MONOCYTES # BLD AUTO: 0.57 X10*3/UL (ref 0.1–1)
MONOCYTES NFR BLD AUTO: 9.2 %
NEUTROPHILS # BLD AUTO: 3.58 X10*3/UL (ref 1.2–7.7)
NEUTROPHILS NFR BLD AUTO: 57.5 %
NRBC BLD-RTO: 0 /100 WBCS (ref 0–0)
PLATELET # BLD AUTO: 265 X10*3/UL (ref 150–450)
POTASSIUM SERPL-SCNC: 4.4 MMOL/L (ref 3.5–5.3)
PROT SERPL-MCNC: 6.8 G/DL (ref 6.4–8.2)
RBC # BLD AUTO: 4.77 X10*6/UL (ref 4–5.2)
SODIUM SERPL-SCNC: 138 MMOL/L (ref 136–145)
WBC # BLD AUTO: 6.2 X10*3/UL (ref 4.4–11.3)

## 2024-02-29 PROCEDURE — 82306 VITAMIN D 25 HYDROXY: CPT

## 2024-02-29 PROCEDURE — 80053 COMPREHEN METABOLIC PANEL: CPT

## 2024-02-29 PROCEDURE — 86140 C-REACTIVE PROTEIN: CPT

## 2024-02-29 PROCEDURE — 82550 ASSAY OF CK (CPK): CPT

## 2024-02-29 PROCEDURE — 85025 COMPLETE CBC W/AUTO DIFF WBC: CPT

## 2024-02-29 PROCEDURE — 83520 IMMUNOASSAY QUANT NOS NONAB: CPT

## 2024-02-29 PROCEDURE — 85652 RBC SED RATE AUTOMATED: CPT

## 2024-02-29 PROCEDURE — 36415 COLL VENOUS BLD VENIPUNCTURE: CPT

## 2024-03-04 LAB — TRYPTASE SERPL-MCNC: 3.6 UG/L

## 2024-03-12 PROBLEM — M51.26 HERNIATION OF LUMBAR INTERVERTEBRAL DISC WITHOUT MYELOPATHY: Status: ACTIVE | Noted: 2024-03-12

## 2024-03-12 PROBLEM — Z86.2 HISTORY OF IMMUNE DISORDER: Status: ACTIVE | Noted: 2024-03-12

## 2024-03-12 PROBLEM — M24.9 HYPERMOBILITY OF JOINT: Status: ACTIVE | Noted: 2024-03-12

## 2024-03-12 PROBLEM — M54.6 THORACIC BACK PAIN: Status: ACTIVE | Noted: 2024-03-12

## 2024-03-12 PROBLEM — G90.A POTS (POSTURAL ORTHOSTATIC TACHYCARDIA SYNDROME): Status: ACTIVE | Noted: 2023-08-26

## 2024-03-12 PROBLEM — F44.9 PSYCHOLOGIC CONVERSION DISORDER: Status: ACTIVE | Noted: 2023-04-19

## 2024-03-12 PROBLEM — H04.129 TEAR FILM INSUFFICIENCY: Status: ACTIVE | Noted: 2024-03-12

## 2024-03-12 PROBLEM — E28.2 POLYCYSTIC OVARIES: Status: ACTIVE | Noted: 2024-03-12

## 2024-03-12 PROBLEM — K52.9 CHRONIC DIARRHEA: Status: ACTIVE | Noted: 2024-03-12

## 2024-03-12 PROBLEM — M43.16 SPONDYLOLISTHESIS, LUMBAR REGION: Status: ACTIVE | Noted: 2023-08-31

## 2024-03-12 PROBLEM — G83.10 PARESIS OF SINGLE LOWER EXTREMITY (MULTI): Status: ACTIVE | Noted: 2024-03-12

## 2024-03-12 PROBLEM — M41.9 SCOLIOSIS, UNSPECIFIED: Status: ACTIVE | Noted: 2023-08-31

## 2024-03-12 PROBLEM — S06.0XAA CONCUSSION INJURY OF BODY STRUCTURE: Status: ACTIVE | Noted: 2024-03-12

## 2024-03-12 RX ORDER — DOXYCYCLINE 100 MG/1
CAPSULE ORAL
COMMUNITY
Start: 2024-02-09 | End: 2024-03-14 | Stop reason: ALTCHOICE

## 2024-03-14 ENCOUNTER — OFFICE VISIT (OUTPATIENT)
Dept: RHEUMATOLOGY | Facility: CLINIC | Age: 39
End: 2024-03-14
Payer: COMMERCIAL

## 2024-03-14 ENCOUNTER — LAB (OUTPATIENT)
Dept: LAB | Facility: LAB | Age: 39
End: 2024-03-14
Payer: COMMERCIAL

## 2024-03-14 VITALS
BODY MASS INDEX: 31.8 KG/M2 | HEIGHT: 60 IN | OXYGEN SATURATION: 98 % | HEART RATE: 97 BPM | WEIGHT: 162 LBS | SYSTOLIC BLOOD PRESSURE: 114 MMHG | DIASTOLIC BLOOD PRESSURE: 74 MMHG

## 2024-03-14 DIAGNOSIS — E55.9 VITAMIN D DEFICIENCY: ICD-10-CM

## 2024-03-14 DIAGNOSIS — R74.01 TRANSAMINITIS: ICD-10-CM

## 2024-03-14 DIAGNOSIS — M06.09 RHEUMATOID ARTHRITIS WITHOUT RHEUMATOID FACTOR, MULTIPLE SITES (MULTI): ICD-10-CM

## 2024-03-14 DIAGNOSIS — Z79.899 ENCOUNTER FOR LONG-TERM (CURRENT) USE OF MEDICATIONS: ICD-10-CM

## 2024-03-14 DIAGNOSIS — M06.09 POLYARTHRITIS WITH NEGATIVE RHEUMATOID FACTOR (MULTI): Primary | ICD-10-CM

## 2024-03-14 DIAGNOSIS — E06.3 HASHIMOTO'S THYROIDITIS: ICD-10-CM

## 2024-03-14 LAB
ALBUMIN SERPL BCP-MCNC: 4.4 G/DL (ref 3.4–5)
ALP SERPL-CCNC: 56 U/L (ref 33–110)
ALT SERPL W P-5'-P-CCNC: 17 U/L (ref 7–45)
ANION GAP SERPL CALC-SCNC: 12 MMOL/L (ref 10–20)
APPEARANCE UR: CLEAR
AST SERPL W P-5'-P-CCNC: 17 U/L (ref 9–39)
BILIRUB SERPL-MCNC: 0.4 MG/DL (ref 0–1.2)
BILIRUB UR STRIP.AUTO-MCNC: NEGATIVE MG/DL
BUN SERPL-MCNC: 11 MG/DL (ref 6–23)
CALCIUM SERPL-MCNC: 9.6 MG/DL (ref 8.6–10.6)
CHLORIDE SERPL-SCNC: 103 MMOL/L (ref 98–107)
CO2 SERPL-SCNC: 28 MMOL/L (ref 21–32)
COLOR UR: NORMAL
CREAT SERPL-MCNC: 0.74 MG/DL (ref 0.5–1.05)
EGFRCR SERPLBLD CKD-EPI 2021: >90 ML/MIN/1.73M*2
GLUCOSE SERPL-MCNC: 74 MG/DL (ref 74–99)
GLUCOSE UR STRIP.AUTO-MCNC: NORMAL MG/DL
HAV IGM SER QL: NONREACTIVE
HBV CORE IGM SER QL: NONREACTIVE
HBV SURFACE AG SERPL QL IA: NONREACTIVE
HCV AB SER QL: NONREACTIVE
HOLD SPECIMEN: NORMAL
KETONES UR STRIP.AUTO-MCNC: NEGATIVE MG/DL
LEUKOCYTE ESTERASE UR QL STRIP.AUTO: NEGATIVE
NITRITE UR QL STRIP.AUTO: NEGATIVE
PH UR STRIP.AUTO: 5.5 [PH]
POTASSIUM SERPL-SCNC: 4 MMOL/L (ref 3.5–5.3)
PROT SERPL-MCNC: 6.8 G/DL (ref 6.4–8.2)
PROT UR STRIP.AUTO-MCNC: NEGATIVE MG/DL
RBC # UR STRIP.AUTO: NEGATIVE /UL
SODIUM SERPL-SCNC: 139 MMOL/L (ref 136–145)
SP GR UR STRIP.AUTO: 1.01
UROBILINOGEN UR STRIP.AUTO-MCNC: NORMAL MG/DL

## 2024-03-14 PROCEDURE — 99213 OFFICE O/P EST LOW 20 MIN: CPT | Performed by: INTERNAL MEDICINE

## 2024-03-14 PROCEDURE — 86015 ACTIN ANTIBODY EACH: CPT

## 2024-03-14 PROCEDURE — 81003 URINALYSIS AUTO W/O SCOPE: CPT

## 2024-03-14 PROCEDURE — 36415 COLL VENOUS BLD VENIPUNCTURE: CPT

## 2024-03-14 PROCEDURE — 80074 ACUTE HEPATITIS PANEL: CPT

## 2024-03-14 PROCEDURE — 86381 MITOCHONDRIAL ANTIBODY EACH: CPT

## 2024-03-14 PROCEDURE — 80053 COMPREHEN METABOLIC PANEL: CPT

## 2024-03-14 RX ORDER — METHYLPREDNISOLONE 4 MG/1
8 TABLET ORAL DAILY
Qty: 60 TABLET | Refills: 1 | Status: SHIPPED | OUTPATIENT
Start: 2024-03-14 | End: 2024-03-21

## 2024-03-14 ASSESSMENT — ENCOUNTER SYMPTOMS
DEPRESSION: 1
OCCASIONAL FEELINGS OF UNSTEADINESS: 0
LOSS OF SENSATION IN FEET: 0

## 2024-03-14 ASSESSMENT — ROUTINE ASSESSMENT OF PATIENT INDEX DATA (RAPID3)
ON A SCALE OF ONE TO TEN, CONSIDERING ALL THE WAYS IN WHICH ILLNESS AND HEALTH CONDITIONS MAY AFFECT YOU AT THIS TIME, PLEASE INDICATE BELOW HOW YOU ARE DOING:: 5
ON A SCALE OF ONE TO TEN, CONSIDERING ALL THE WAYS IN WHICH ILLNESS AND HEALTH CONDITIONS MAY AFFECT YOU AT THIS TIME, PLEASE INDICATE BELOW HOW YOU ARE DOING:: 5
WALK_FLAT_GROUND: WITHOUT ANY DIFFICULTY
ON A SCALE OF ONE TO TEN, HOW MUCH PAIN HAVE YOU HAD BECAUSE OF YOUR CONDITION OVER THE PAST WEEK?: 5
FEELINGS_DEPRESSION: WITH SOME DIFFICULTY
IN_OUT_BED: WITHOUT ANY DIFFICULTY
IN_OUT_TRANSPORT: WITHOUT ANY DIFFICULTY
GOOD_NIGHTS_SLEEP: WITH SOME DIFFICULTY
TURN_FAUCETS_OFF: WITHOUT ANY DIFFICULTY
WASH_DRY_BODY: WITHOUT ANY DIFFICULTY
SUM OF QUESTIONS A TO J: 2
LIFT_CUP_TO_MOUTH: WITHOUT ANY DIFFICULTY
ON A SCALE OF ONE TO TEN, HOW MUCH PAIN HAVE YOU HAD BECAUSE OF YOUR CONDITION OVER THE PAST WEEK?: 5
TOTAL RAPID3 SCORE: 10.7
DRESS_YOURSELF: WITHOUT ANY DIFFICULTY
PARTIPATE_RECREATIONAL_ACTIVITIES: WITH SOME DIFFICULTY
FEELINGS_ANXIETY_NERVOUS: WITHOUT ANY DIFFICULTY
PICK_CLOTHES_OFF_FLOOR: WITHOUT ANY DIFFICULTY
FN_SCORE: 0.7
SEVERITY_SCORE: 0
WEIGHTED_TOTAL_SCORE: 3.57
WALK_KILOMETERS: WITH SOME DIFFICULTY

## 2024-03-14 ASSESSMENT — PATIENT HEALTH QUESTIONNAIRE - PHQ9
2. FEELING DOWN, DEPRESSED OR HOPELESS: NOT AT ALL
SUM OF ALL RESPONSES TO PHQ9 QUESTIONS 1 AND 2: 0
1. LITTLE INTEREST OR PLEASURE IN DOING THINGS: NOT AT ALL

## 2024-03-14 ASSESSMENT — PAIN SCALES - GENERAL: PAINLEVEL_OUTOF10: 4

## 2024-03-14 ASSESSMENT — PAIN - FUNCTIONAL ASSESSMENT: PAIN_FUNCTIONAL_ASSESSMENT: 0-10

## 2024-03-14 NOTE — PROGRESS NOTES
[facility]/  Rheumatology  [address] [place] [address], [address]  [place] [address]  Phone: [phone]  Fax: [phone]    Rheumatology Progress Note 3/14/24    Elma Cleveland is a 38 y.o. female here for   Chief Complaint   Patient presents with    Follow-up     labs       Last Visit: 11/9/23    Rheum Hx  Referred by for: CRP ELEVATED/FOOT PAIN/ JOINT  PAIN/MALAISE AND FATIGUE/NUMBNESS  Patient has seen multiple rheumatologists including Dr. Crespo,  Dr. Caballero, and Dr. Mercado.  Per available records review Dr. Caballero diagnosed patient with  question of undifferentiated connective tissue disease. Patient has  a history of joint pains and high EVELIN at the age of 16 years old.  Found to have RNP slightly elevated. Negative HLA B27, RF, CCP,  Anca, CRP, uric acid, CBC, SPEP, LAC  Patient also saw Dr. Kumar who diagnosed her with chronic  pain, likely undifferentiated connective tissue disease with positive  EVELIN low RNP and Hashimoto's history  It looks a patient has been treated with non steroid antiinflammatory  drug and narcotics.  Patient apparently has also seen Dermatology but  unfortunately could not tolerate the Xyzal. She had a full body rash  with Plaquenil.  Chief complaint: Joint pain, muscle tightness  Since 16 years old  Slow onset  Intermittent course  Precipitating factors: None  Associated symptoms: Pain in joints, night sweats, low-grade  fevers, tiredness  Better: Aleve, sleep  Worse: Stress  Previous treatments naproxen-somewhat helpful  Ibuprofen-somewhat helpful  Meloxicam-no help  Diclofenac-no help  Celecoxib-no help  Acetaminophen-no help  Steroids-very helpful  Plaquenil-discontinued due to rash  Occupation: RN    Previous Rheum note reviewed. Dx with UCTD as a teenager. Low + EVELIN and RNP. Diagnosed by Dr Caballero. Mother Dx sith SLE. Then saw Dr Kumar and Dr Layne.  EVELIN 1:80 2016  Chronic urticaria/Multiple allergies/Asthma  LAD, s/p bx- benign  PCOS  Benign joint  hypermobility.   Hypothyroidism  Since 16 y/o after mono, everything snowballed from there.  Patient has had multiple to and including joint aches, infection  once a year bronchitis that will not go way. States nothing has been  done. 3 days full (not usually) body rash treated with steroids for  over a month.  Usual have rash is high pinpoint nonpruritic. Patient saw  allergist and told she is allergic to life. Patient takes Zyrtec for day  to help with the itchiness and rashes.  Does not have PCOS. Patient cannot take birth control has  makes her sick. Off Aldactone notes horrible acne and cystic acne.  Currently tenderness of bilateral elbows- left elbow is always  the worst, upper spine and lower spine, left knee and cas 3rd 4th  PIPs  No noted swelling  30 minutes to 1 hour of morning stiffness feels muscle  tightness that never seems to go away  Has had deep tissue massage 2-3/wk told very tight knots  She has never tried muscle relaxant or acupuncture.  History of right tennis elbow  Severe back pain more recently tightness lower back and hips  sometimes arms. Notes response in past to Synthroid.  Review of systems positive for:  Fatigue  LN >1 yr- biopsy normal per pt- ?reactive lymphoid  hyperplasia  Dry eyes, constant dry nose  Canker sores not as freq  R ear pulsatile tinnitus/MRA MRI unremarkable; ? scar tissue  +HSV  History of lung disease- Asthma cough variant, not well  controlled  Vocal cord dysfunction due to reflux- Pepcid has almost fixed  it; pantoprazole not working as well; to see GI  Alb incrased heart rate; on Xopenex and alb neb prn  Echo- slight pericardial thickening, ?elevated RVSP; hx of  vasovagal syncope from stomach pain  Gastroesophageal reflux disease  Abdominal pain/ prominent Peyer's patches/ no  gallbladder/dumping syndrome/ Bentyl or Levsin sometimes help  History of kidney stone  Rashes- hormonla; increased DHEAS; no adrenal hyperplasia;  cysts that come and go; cortisol test  done during her night shift  work  Hx of 3 L knee surgeries  Joint pain  Morning stiffness  Back pain that wakes her up from sleep approved upon getting  up and with activity and with rest  Myalgias  Hx of concussion  Weakness right foot- Saw Neuro and did PT up and down; MRI  spine showed herniated disc; EMP upper arm skin couldn't even  feel it.; nystagmus and tiredness  Frequent infections including bacterial vaginosis, trichomonas  History of thyroid disease- +TPO, very symptomatci in 2014  and at TSH level 5; will be following with Endo  Allergies  Depression  Component      Latest Ref Rng 5/22/2018 12/3/2019   Tissue Transglutaminase, IgA      0 - 14  <1     Tissue Transglutamase IgG      0 - 14  <1     DEAMIDATED GLIADIN PEPTIDE IGA      0 - 14  1     DEAMIDATED GLIADIN PEPTIDE IGG      0 - 14  <1     Mononucleosis Screen      NEGATIVE   NEGATIVE      Component      Latest Ref Rng 9/14/2020 12/8/2020   EVELIN      NEGATIVE  POSITIVE !     EVELIN Titer 1:80     EVELIN Pattern HOMOGENEOUS     Anti-SM      AI <0.2     Anti-RNP      AI 2.0 !     Anti-SM/RNP      AI <0.2     Anti-SSA      AI <0.2     Anti-SSB      AI <0.2     Anti-SCL-70      AI <0.2     Anti-RANDY-1 IgG      AI <0.2     Anti-Chromatin      AI <0.2     Anti-Centromere      AI <0.2     ANTI-RIBOSOMAL P      AI <0.2     Anti-DNA (DS)      IU/mL 1.0     C-ANCA (PR3) BY EIA  (Note)…    C-ANCA FLOURESCENCE  Negative…    P-ANCA (MPO) BY EIA  (Note)…    P-ANCA FLOURESCENCE  Negative…    ANCP INTERPRETATION  (Note)…    Staff Review  (Note)…    IgG Cardiolipin Ab  <9.0…    IgM Cardiolipin Ab  20.8… (H)    IgA Cardiolipin Ab  11.2…    Beta-2 Glyco 1 IgG  <9…    Beta 2 Glyco 1 IgM  <9…    Angiotensin 1 Conv  26…    Beta-2 Glyco 1 IgA  1…    Centromere Ab  Negative…      Component      Latest Ref Rng 3/17/2021 8/4/2022   IgG Cardiolipin Ab <9.0…     IgM Cardiolipin Ab 10.1…     IgA Cardiolipin Ab 12.4… (H)     PTT-LA  37.0…    DRVVT  37.1…    Lupus Anticoagulant  Interpretation  No LAC   SSA ANTIBODIES <0.2…     SSB ANTIBODIES <0.2…          Previous Tx  naproxen-somewhat helpful  Ibuprofen-somewhat helpful  Meloxicam-no help  Diclofenac-no help  Celecoxib-no help  Acetaminophen-no help  Steroids-very helpful  Plaquenil-discontinued due to rash    Health Maintenance  DXA- none  Component      Latest Ref Rng 4/25/2019 12/3/2019 12/8/2020   Herpes Simplex I IgG <0.2…      HSV 1, IgG Negative…      Herpes Simplex 2 IgG <0.2…      HSV 2, IgG Negative…      HERPES SIMPLEX IGM 0.79…      HSV IgM Qualitative Negative…      Mononucleosis Screen      NEGATIVE   NEGATIVE     Hepatitis B Surface AG      NEG    NEGATIVE    Hepatitis C AB   Negative…    Lyme Antibodies Screen   Negative…    Malignancy Hx- none  Immunization History   Administered Date(s) Administered    AS03 Adjuvant 09/23/2021    Flu vaccine (IIV4), preservative free *Check age/dose* 09/15/2019, 10/01/2020    Flu vaccine, quadrivalent, high-dose, preservative free, age 65y+ (FLUZONE) 09/23/2021    Flu vaccine, quadrivalent, no egg protein, age 6 month or greater (FLUCELVAX) 11/01/2023    Influenza, Unspecified 10/19/2011, 09/26/2012, 10/04/2013, 11/04/2014, 09/26/2017, 10/20/2022, 10/12/2023    Influenza, seasonal, injectable 10/22/2013, 10/30/2019    Influenza, trivalent, adjuvanted 10/14/2022    MMR vaccine, subcutaneous (MMR II) 09/01/1998    Moderna COVID-19 vaccine, Fall 2023, 12 yeasrs and older (50mcg/0.5mL) 11/13/2023    Moderna COVID-19 vaccine, bivalent, blue cap/gray label *Check age/dose* 11/12/2022    Moderna SARS-CoV-2 Vaccination 05/28/2021, 06/25/2021, 02/04/2022    PPD Test 12/25/2010, 04/04/2011    Tdap vaccine, age 7 year and older (BOOSTRIX, ADACEL) 09/16/2019          Past Medical History:   Diagnosis Date    Acute vaginitis 02/01/2020    Vaginitis and vulvovaginitis    Anesthesia of skin 10/03/2017    Right arm numbness    Carpal tunnel syndrome, left upper limb 12/20/2016    Left carpal tunnel  syndrome    Concussion with loss of consciousness of unspecified duration, subsequent encounter     Concussion with loss of consciousness of unspecified duration, subsequent encounter    Diarrhea, unspecified 11/08/2018    Acute diarrhea    Dry eye syndrome of bilateral lacrimal glands     Insufficiency of tear film of both eyes    Encounter for surveillance of contraceptive pills 04/25/2018    Encounter for birth control pills maintenance    Hashimoto's disease     Headache, unspecified 07/05/2019    Mixed headache    Hypo-osmolality and hyponatremia 04/05/2020    Hyponatremia    Immunization not carried out because of patient refusal 07/05/2019    Influenza vaccination declined    Left lower quadrant abdominal tenderness 08/22/2017    Abdominal left lower quadrant tenderness    Left lower quadrant pain 11/17/2016    Colicky LLQ abdominal pain    Other conditions influencing health status 08/22/2017    History of chronic diarrhea    Other intervertebral disc displacement, lumbar region     Herniated lumbar disc without myelopathy    Other malaise 09/10/2020    Malaise and fatigue    Other specified noninflammatory disorders of vagina     Vaginal lesion    Other symptoms and signs involving the musculoskeletal system 10/04/2018    Weakness of right foot    Other symptoms and signs involving the musculoskeletal system 09/28/2018    Weakness of right foot    Other symptoms and signs involving the musculoskeletal system 10/02/2018    Weakness of right lower extremity    Pain in right knee 04/25/2018    Right knee pain    Pain in unspecified foot 09/10/2020    Foot pain    Pelvic and perineal pain 06/05/2020    Pelvic pain    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    Personal history of diseases of the blood and blood-forming organs and certain disorders involving the immune mechanism     History of autoimmune disorder    Personal history of diseases of the skin and  subcutaneous tissue 06/10/2016    History of allergic urticaria    Personal history of other benign neoplasm     History of adenoma of adrenal gland    Personal history of other diseases of the circulatory system 10/25/2016    History of irregular heartbeat    Personal history of other diseases of the digestive system 07/05/2019    History of left inguinal hernia    Personal history of other diseases of the digestive system 02/28/2018    History of hemorrhoids    Personal history of other diseases of the female genital tract 05/22/2018    History of menorrhagia    Personal history of other diseases of the musculoskeletal system and connective tissue 09/02/2020    History of joint pain    Personal history of other diseases of the musculoskeletal system and connective tissue 09/10/2020    History of joint pain    Personal history of other diseases of the respiratory system 02/28/2018    History of acute sinusitis    Personal history of other diseases of the respiratory system     History of asthma    Personal history of other diseases of the respiratory system 12/21/2016    History of acute sinusitis    Personal history of other endocrine, nutritional and metabolic disease 04/06/2020    History of hyperkalemia    Personal history of other infectious and parasitic diseases     History of mononucleosis    Personal history of other specified conditions 07/05/2019    History of nausea    Personal history of other specified conditions 03/27/2019    History of palpitations    Personal history of other specified conditions 08/22/2017    History of urinary frequency    Personal history of other specified conditions 08/12/2021    History of dizziness    Personal history of other specified conditions 07/05/2019    History of headache    Personal history of other specified conditions 09/10/2020    History of numbness    Personal history of other specified conditions 12/30/2016    History of weight gain    Personal history of  traumatic brain injury 06/04/2018    History of concussion    Polyarthritis with negative rheumatoid factor (CMS/HCC)     Rash and other nonspecific skin eruption 06/10/2016    Rash of unknown cause    Right upper quadrant pain 12/03/2019    Right upper quadrant abdominal pain    Syncope     Syncope and collapse 02/28/2018    Vasovagal near syncope    Tremor, unspecified 12/19/2016    Tremor of right hand    Unspecified abdominal pain 12/12/2019    Abdominal pain of unknown etiology    Unspecified asthma with (acute) exacerbation 02/28/2018    Acute asthma exacerbation    Unspecified symptoms and signs involving the genitourinary system 12/05/2019    UTI symptoms      Past Surgical History:   Procedure Laterality Date    ADENOIDECTOMY  10/24/2016    Adenoidectomy    GALLBLADDER SURGERY  06/10/2016    Gallbladder Surgery    HERNIA REPAIR  06/10/2016    Hernia Repair    KNEE SURGERY Left 06/10/2016    2001, 2007, 2008    OTHER SURGICAL HISTORY  10/24/2016    Deep Cervical Lymph Node Biopsy    TONSILLECTOMY  10/24/2016    Tonsillectomy      Current Outpatient Medications   Medication Sig Dispense Refill    acyclovir (Zovirax) 5 % ointment 1 Application.      celecoxib (CeleBREX) 400 mg capsule Take 1 capsule (400 mg) by mouth once daily with breakfast.      certolizumab pegol (Cimzia) injection INJECT 2 SYRINGES (400MG) UNDER THE SKIN ONCE EVERY 4 WEEKS. 2 mL 11    cetirizine (ZyrTEC) 10 mg tablet 1 (one) time each day at the same time.      cholecalciferol (Vitamin D-3) 50 MCG (2000 UT) tablet Take 1 tablet (2,000 Units) by mouth once daily.      cyclobenzaprine (Flexeril) 10 mg tablet Take 1 tablet (10 mg) by mouth once daily at bedtime.      famotidine (Pepcid) 10 mg tablet       hydrOXYzine HCL (Atarax) 25 mg tablet Take 1 tablet (25 mg) by mouth 3 times a day as needed for itching.      levalbuterol (Xopenex HFA) 45 mcg/actuation inhaler Inhale 2 puffs every 6 hours.      methocarbamol (Robaxin) 500 mg tablet Take  by mouth.      mometasone (Nasonex) 50 mcg/actuation nasal spray Administer into affected nostril(s).      sulfaSALAzine (Azulfidine) 500 mg tablet TAKE 1 TABLET BY MOUTH TWICE A DAY FOR 30 DAYS 180 tablet 3    thyroid, pork, (Etta) 30 mg tablet Take 1 tablet (30 mg) by mouth once daily. Take 30 mg by mouth in the morning. Take with 15mg tab daily.      valACYclovir (Valtrex) 500 mg tablet Take 1 tablet (500 mg) by mouth 2 times a day as needed (OUTBREAK).      amphetamine-dextroamphetamine (Adderall) 15 mg tablet Take 1 tablet (15 mg) by mouth once daily. 30 tablet 0    metoprolol succinate XL (Toprol-XL) 25 mg 24 hr tablet Take 1 tablet (25 mg) by mouth early in the morning.. Do not crush or chew..      thyroid, pork, 15 mg tablet Take 1 tablet (15 mg) by mouth once daily.  Take 1 tablet (15 mg) by mouth in the morning. Take with the 30 mg tab.       No current facility-administered medications for this visit.      Allergies   Allergen Reactions    Ciprofloxacin Other     Reaction: Vomiting    Codeine Sulfate Other     NIGHTMARES AND SLEEPWALKING    Hydroxychloroquine Rash     Full body rash    Oxycodone-Acetaminophen Itching     full body itching    Tuberculin Ppd Unknown and Rash     ALLERGY TO BOTH APLISOL AND TUBERSOL CAUSING REDNESS, RASH AND IRRITATION UNDER SKIN.  ANNUAL SCREENING IS A TB GOLD.    Codeine Hives    Gum Yjkhjg-Udqgst-Bitn-Alcohol Unknown    Sodium Lauroylsarcosinate Unknown    Adhesive Tape-Silicones Rash    Blue Dye Hives and Rash    Dibucaine Hives and Rash     Other Reaction(s): Unknown    Gold Keratinate Hives and Rash    Gold Sodium Thiosulfate Rash    Lidocaine Hives and Rash    Linalool Hives and Rash    Nickel Hives and Rash    Sodium Laureth Sulfate Unknown and Rash    Sodium Thiosulfate Rash    Thimerosal Hives and Rash    Wound Dressings Hives and Rash     Other Reaction(s): hives    Yellow Dye Hives and Rash      Visit Vitals  /74   Pulse 97   Ht 1.524 m (5')   Wt 73.5 kg  (162 lb)   SpO2 98%   BMI 31.64 kg/m²   OB Status Unknown   Smoking Status Never   BSA 1.76 m²        Pain Assessment Pain Score: 4, Pain Location: Shoulder (shoulders and back)     Rapid 3  Function Score (FN): 0.7  Pain Score (PN) (0-10): 5  Patient Global (PTGL) (0-10): 5  Rapid3 Score: 10.7      Workup  Component      Latest Ref Rn 2/29/2024   WBC      4.4 - 11.3 x10*3/uL 6.2    nRBC      0.0 - 0.0 /100 WBCs 0.0    RBC      4.00 - 5.20 x10*6/uL 4.77    HEMOGLOBIN      12.0 - 16.0 g/dL 14.2    HEMATOCRIT      36.0 - 46.0 % 46.1 (H)    MCV      80 - 100 fL 97    MCH      26.0 - 34.0 pg 29.8    MCHC      32.0 - 36.0 g/dL 30.8 (L)    RED CELL DISTRIBUTION WIDTH      11.5 - 14.5 % 12.5    Platelets      150 - 450 x10*3/uL 265    Neutrophils %      40.0 - 80.0 % 57.5    Immature Granulocytes %, Automated      0.0 - 0.9 % 0.3    Lymphocytes %      13.0 - 44.0 % 31.7    Monocytes %      2.0 - 10.0 % 9.2    Eosinophils %      0.0 - 6.0 % 0.3    Basophils %      0.0 - 2.0 % 1.0    Neutrophils Absolute      1.20 - 7.70 x10*3/uL 3.58    Immature Granulocytes Absolute, Automated      0.00 - 0.70 x10*3/uL 0.02    Lymphocytes Absolute      1.20 - 4.80 x10*3/uL 1.97    Monocytes Absolute      0.10 - 1.00 x10*3/uL 0.57    Eosinophils Absolute      0.00 - 0.70 x10*3/uL 0.02    Basophils Absolute      0.00 - 0.10 x10*3/uL 0.06    GLUCOSE      74 - 99 mg/dL 63 (L)    SODIUM      136 - 145 mmol/L 138    POTASSIUM      3.5 - 5.3 mmol/L 4.4    CHLORIDE      98 - 107 mmol/L 101    Bicarbonate      21 - 32 mmol/L 30    Anion Gap      10 - 20 mmol/L 11    Blood Urea Nitrogen      6 - 23 mg/dL 17    Creatinine      0.50 - 1.05 mg/dL 0.68    EGFR      >60 mL/min/1.73m*2 >90    Calcium      8.6 - 10.6 mg/dL 9.7    Albumin      3.4 - 5.0 g/dL 4.4    Alkaline Phosphatase      33 - 110 U/L 58    Total Protein      6.4 - 8.2 g/dL 6.8    AST      9 - 39 U/L 77 (H)    Bilirubin Total      0.0 - 1.2 mg/dL 0.4    ALT      7 - 45 U/L 110 (H)     C-Reactive Protein      <1.00 mg/dL 0.42    Creatine Kinase      0 - 215 U/L 48    Sed Rate      0 - 20 mm/h 4    Vitamin D, 25-Hydroxy, Total      30 - 100 ng/mL 31    Tryptase      <=10.9 ug/L 3.6              Assessment/Plan  1. Polyarthritis with negative rheumatoid factor (CMS/[facility])    2. Hashimoto's thyroiditis    3. Vitamin D deficiency    4. Encounter for long-term (current) use of medications    5. Transaminitis    6. Rheumatoid arthritis without rheumatoid factor, multiple sites (CMS/[facility])         Orders Placed This Encounter   Procedures    CBC and Auto Differential    Comprehensive Metabolic Panel    C-Reactive Protein    Creatine Kinase    Vectra; [facility]; 927783 - Miscellaneous Test    Vitamin D 25-Hydroxy,Total (for eval of Vitamin D levels)    Urinalysis with Reflex Culture and Microscopic    Plaquenil level; [facility]; 509975 - Miscellaneous Test    Sedimentation Rate    Comprehensive Metabolic Panel    Urinalysis with Reflex Culture and Microscopic    Hepatitis Panel, Acute    Anti-Mitochondrial Antibody    Anti-Smooth Muscle Antibody        Since last appt, adherent and tolerating Cimzia  Infections  Medrol prn  Denies any recent or current infection.  Not on any NSAIDs or glucocorticoids.  ROS+ for drenching night sweats without fever, fatigue, red/painful eues, sicca, sore throat, swollen glands/nodes, abd pain, rashes, stiffness.  Rapid 3 consistent with moderate severity.  Labs reviewed  D/w pt tx options and decided on   Continue Cimzia  Hold Celebrex  Medrol prn  Check LFTs and transaminitis workup.  Advised of possible side effects and importance of monitoring.   All questions answered.  Patient to follow up with primary care provider regarding all other medical issues not addressed today and for medical chart updating.     Genesis Bauer MD      Patient Care Team:  Margaret Gould DO as PCP - General (Family Medicine)  Margaret Gould DO as PCP - Employee [employer] PCP  Marisela  Alberto Aguilar DO as Primary Care Provider  Genesis Bauer MD as Consulting Physician (Rheumatology)

## 2024-03-15 LAB
MITOCHONDRIA AB SER QL IF: NEGATIVE
SMOOTH MUSCLE AB SER QL IF: NEGATIVE

## 2024-03-21 ENCOUNTER — PATIENT MESSAGE (OUTPATIENT)
Dept: RHEUMATOLOGY | Facility: CLINIC | Age: 39
End: 2024-03-21
Payer: COMMERCIAL

## 2024-03-22 ENCOUNTER — SPECIALTY PHARMACY (OUTPATIENT)
Dept: PHARMACY | Facility: CLINIC | Age: 39
End: 2024-03-22

## 2024-03-25 PROCEDURE — RXMED WILLOW AMBULATORY MEDICATION CHARGE

## 2024-03-26 ENCOUNTER — PHARMACY VISIT (OUTPATIENT)
Dept: PHARMACY | Facility: CLINIC | Age: 39
End: 2024-03-26
Payer: COMMERCIAL

## 2024-04-19 ENCOUNTER — PHARMACY VISIT (OUTPATIENT)
Dept: PHARMACY | Facility: CLINIC | Age: 39
End: 2024-04-19
Payer: COMMERCIAL

## 2024-04-19 ENCOUNTER — SPECIALTY PHARMACY (OUTPATIENT)
Dept: PHARMACY | Facility: CLINIC | Age: 39
End: 2024-04-19

## 2024-04-19 PROCEDURE — RXMED WILLOW AMBULATORY MEDICATION CHARGE

## 2024-05-14 ENCOUNTER — SPECIALTY PHARMACY (OUTPATIENT)
Dept: PHARMACY | Facility: CLINIC | Age: 39
End: 2024-05-14

## 2024-05-14 PROCEDURE — RXMED WILLOW AMBULATORY MEDICATION CHARGE

## 2024-05-17 ENCOUNTER — PHARMACY VISIT (OUTPATIENT)
Dept: PHARMACY | Facility: CLINIC | Age: 39
End: 2024-05-17
Payer: COMMERCIAL

## 2024-06-12 ENCOUNTER — SPECIALTY PHARMACY (OUTPATIENT)
Dept: PHARMACY | Facility: CLINIC | Age: 39
End: 2024-06-12

## 2024-06-12 PROCEDURE — RXMED WILLOW AMBULATORY MEDICATION CHARGE

## 2024-06-14 ENCOUNTER — PHARMACY VISIT (OUTPATIENT)
Dept: PHARMACY | Facility: CLINIC | Age: 39
End: 2024-06-14
Payer: COMMERCIAL

## 2024-06-28 ENCOUNTER — APPOINTMENT (OUTPATIENT)
Dept: OPHTHALMOLOGY | Facility: CLINIC | Age: 39
End: 2024-06-28
Payer: COMMERCIAL

## 2024-07-02 ENCOUNTER — LAB (OUTPATIENT)
Dept: LAB | Facility: LAB | Age: 39
End: 2024-07-02
Payer: COMMERCIAL

## 2024-07-02 DIAGNOSIS — M06.09 POLYARTHRITIS WITH NEGATIVE RHEUMATOID FACTOR (MULTI): ICD-10-CM

## 2024-07-02 DIAGNOSIS — E55.9 VITAMIN D DEFICIENCY: ICD-10-CM

## 2024-07-02 LAB
25(OH)D3 SERPL-MCNC: 29 NG/ML (ref 30–100)
ALBUMIN SERPL BCP-MCNC: 4.2 G/DL (ref 3.4–5)
ALP SERPL-CCNC: 58 U/L (ref 33–110)
ALT SERPL W P-5'-P-CCNC: 11 U/L (ref 7–45)
ANION GAP SERPL CALC-SCNC: 13 MMOL/L (ref 10–20)
APPEARANCE UR: CLEAR
AST SERPL W P-5'-P-CCNC: 11 U/L (ref 9–39)
BASOPHILS # BLD AUTO: 0.06 X10*3/UL (ref 0–0.1)
BASOPHILS NFR BLD AUTO: 0.9 %
BILIRUB SERPL-MCNC: 0.3 MG/DL (ref 0–1.2)
BILIRUB UR STRIP.AUTO-MCNC: NEGATIVE MG/DL
BUN SERPL-MCNC: 18 MG/DL (ref 6–23)
CALCIUM SERPL-MCNC: 9.7 MG/DL (ref 8.6–10.6)
CHLORIDE SERPL-SCNC: 101 MMOL/L (ref 98–107)
CK SERPL-CCNC: 41 U/L (ref 0–215)
CO2 SERPL-SCNC: 28 MMOL/L (ref 21–32)
COLOR UR: COLORLESS
CREAT SERPL-MCNC: 0.89 MG/DL (ref 0.5–1.05)
CRP SERPL-MCNC: 0.31 MG/DL
EGFRCR SERPLBLD CKD-EPI 2021: 85 ML/MIN/1.73M*2
EOSINOPHIL # BLD AUTO: 0.1 X10*3/UL (ref 0–0.7)
EOSINOPHIL NFR BLD AUTO: 1.5 %
ERYTHROCYTE [DISTWIDTH] IN BLOOD BY AUTOMATED COUNT: 11.7 % (ref 11.5–14.5)
ERYTHROCYTE [SEDIMENTATION RATE] IN BLOOD BY WESTERGREN METHOD: 9 MM/H (ref 0–20)
GLUCOSE SERPL-MCNC: 96 MG/DL (ref 74–99)
GLUCOSE UR STRIP.AUTO-MCNC: NORMAL MG/DL
HCT VFR BLD AUTO: 40.8 % (ref 36–46)
HGB BLD-MCNC: 13.7 G/DL (ref 12–16)
IMM GRANULOCYTES # BLD AUTO: 0 X10*3/UL (ref 0–0.7)
IMM GRANULOCYTES NFR BLD AUTO: 0 % (ref 0–0.9)
KETONES UR STRIP.AUTO-MCNC: NEGATIVE MG/DL
LEUKOCYTE ESTERASE UR QL STRIP.AUTO: NEGATIVE
LYMPHOCYTES # BLD AUTO: 2.97 X10*3/UL (ref 1.2–4.8)
LYMPHOCYTES NFR BLD AUTO: 44.2 %
MCH RBC QN AUTO: 31.1 PG (ref 26–34)
MCHC RBC AUTO-ENTMCNC: 33.6 G/DL (ref 32–36)
MCV RBC AUTO: 93 FL (ref 80–100)
MONOCYTES # BLD AUTO: 0.58 X10*3/UL (ref 0.1–1)
MONOCYTES NFR BLD AUTO: 8.6 %
NEUTROPHILS # BLD AUTO: 3.01 X10*3/UL (ref 1.2–7.7)
NEUTROPHILS NFR BLD AUTO: 44.8 %
NITRITE UR QL STRIP.AUTO: NEGATIVE
NRBC BLD-RTO: 0 /100 WBCS (ref 0–0)
PH UR STRIP.AUTO: 6.5 [PH]
PLATELET # BLD AUTO: 254 X10*3/UL (ref 150–450)
POTASSIUM SERPL-SCNC: 4 MMOL/L (ref 3.5–5.3)
PROT SERPL-MCNC: 6.8 G/DL (ref 6.4–8.2)
PROT UR STRIP.AUTO-MCNC: NEGATIVE MG/DL
RBC # BLD AUTO: 4.4 X10*6/UL (ref 4–5.2)
RBC # UR STRIP.AUTO: NEGATIVE /UL
SODIUM SERPL-SCNC: 138 MMOL/L (ref 136–145)
SP GR UR STRIP.AUTO: 1.01
UROBILINOGEN UR STRIP.AUTO-MCNC: NORMAL MG/DL
WBC # BLD AUTO: 6.7 X10*3/UL (ref 4.4–11.3)

## 2024-07-02 PROCEDURE — 36415 COLL VENOUS BLD VENIPUNCTURE: CPT

## 2024-07-02 PROCEDURE — 81003 URINALYSIS AUTO W/O SCOPE: CPT

## 2024-07-02 PROCEDURE — 85025 COMPLETE CBC W/AUTO DIFF WBC: CPT

## 2024-07-02 PROCEDURE — 80053 COMPREHEN METABOLIC PANEL: CPT

## 2024-07-02 PROCEDURE — 85652 RBC SED RATE AUTOMATED: CPT

## 2024-07-02 PROCEDURE — 86140 C-REACTIVE PROTEIN: CPT

## 2024-07-02 PROCEDURE — 82550 ASSAY OF CK (CPK): CPT

## 2024-07-02 PROCEDURE — 82306 VITAMIN D 25 HYDROXY: CPT

## 2024-07-03 LAB — HOLD SPECIMEN: NORMAL

## 2024-07-10 LAB — SCAN RESULT: NORMAL

## 2024-07-11 DIAGNOSIS — E06.3 AUTOIMMUNE THYROIDITIS: ICD-10-CM

## 2024-07-11 RX ORDER — THYROID 30 MG/1
30 TABLET ORAL DAILY
Qty: 90 TABLET | Refills: 3 | Status: SHIPPED | OUTPATIENT
Start: 2024-07-11

## 2024-07-11 RX ORDER — THYROID 15 MG/1
TABLET ORAL
Qty: 90 TABLET | Refills: 3 | Status: SHIPPED | OUTPATIENT
Start: 2024-07-11

## 2024-07-12 ENCOUNTER — SPECIALTY PHARMACY (OUTPATIENT)
Dept: PHARMACY | Facility: CLINIC | Age: 39
End: 2024-07-12

## 2024-07-12 PROCEDURE — RXMED WILLOW AMBULATORY MEDICATION CHARGE

## 2024-07-13 ENCOUNTER — PHARMACY VISIT (OUTPATIENT)
Dept: PHARMACY | Facility: CLINIC | Age: 39
End: 2024-07-13
Payer: COMMERCIAL

## 2024-07-15 RX ORDER — LOTEPREDNOL ETABONATE 5 MG/ML
SUSPENSION/ DROPS OPHTHALMIC
COMMUNITY
Start: 2024-04-13 | End: 2024-07-25 | Stop reason: ALTCHOICE

## 2024-07-22 ENCOUNTER — APPOINTMENT (OUTPATIENT)
Dept: RHEUMATOLOGY | Facility: CLINIC | Age: 39
End: 2024-07-22
Payer: COMMERCIAL

## 2024-07-25 ENCOUNTER — OFFICE VISIT (OUTPATIENT)
Dept: RHEUMATOLOGY | Facility: CLINIC | Age: 39
End: 2024-07-25
Payer: COMMERCIAL

## 2024-07-25 VITALS
OXYGEN SATURATION: 100 % | DIASTOLIC BLOOD PRESSURE: 80 MMHG | BODY MASS INDEX: 32.98 KG/M2 | SYSTOLIC BLOOD PRESSURE: 111 MMHG | HEART RATE: 84 BPM | WEIGHT: 168 LBS | HEIGHT: 60 IN

## 2024-07-25 DIAGNOSIS — M06.09 POLYARTHRITIS WITH NEGATIVE RHEUMATOID FACTOR (MULTI): Primary | ICD-10-CM

## 2024-07-25 DIAGNOSIS — Q79.62 HYPERMOBILE EHLERS-DANLOS SYNDROME (HHS-HCC): ICD-10-CM

## 2024-07-25 DIAGNOSIS — E06.3 HASHIMOTO'S THYROIDITIS: ICD-10-CM

## 2024-07-25 DIAGNOSIS — Z79.899 ENCOUNTER FOR LONG-TERM (CURRENT) USE OF MEDICATIONS: ICD-10-CM

## 2024-07-25 DIAGNOSIS — R76.8 POSITIVE ANA (ANTINUCLEAR ANTIBODY): ICD-10-CM

## 2024-07-25 DIAGNOSIS — E55.9 VITAMIN D DEFICIENCY: ICD-10-CM

## 2024-07-25 PROCEDURE — 99215 OFFICE O/P EST HI 40 MIN: CPT | Performed by: INTERNAL MEDICINE

## 2024-07-25 PROCEDURE — 3008F BODY MASS INDEX DOCD: CPT | Performed by: INTERNAL MEDICINE

## 2024-07-25 ASSESSMENT — ROUTINE ASSESSMENT OF PATIENT INDEX DATA (RAPID3)
ON A SCALE OF ONE TO TEN, HOW MUCH PAIN HAVE YOU HAD BECAUSE OF YOUR CONDITION OVER THE PAST WEEK?: 6
SEVERITY_SCORE: 0
IN_OUT_TRANSPORT: WITHOUT ANY DIFFICULTY
PARTIPATE_RECREATIONAL_ACTIVITIES: WITH SOME DIFFICULTY
TOTAL RAPID3 SCORE: 11.7
WALK_FLAT_GROUND: WITHOUT ANY DIFFICULTY
DRESS_YOURSELF: WITHOUT ANY DIFFICULTY
PICK_CLOTHES_OFF_FLOOR: WITHOUT ANY DIFFICULTY
FEELINGS_ANXIETY_NERVOUS: WITH SOME DIFFICULTY
GOOD_NIGHTS_SLEEP: WITH SOME DIFFICULTY
WALK_KILOMETERS: WITH SOME DIFFICULTY
ON A SCALE OF ONE TO TEN, HOW MUCH PAIN HAVE YOU HAD BECAUSE OF YOUR CONDITION OVER THE PAST WEEK?: 6
ON A SCALE OF ONE TO TEN, CONSIDERING ALL THE WAYS IN WHICH ILLNESS AND HEALTH CONDITIONS MAY AFFECT YOU AT THIS TIME, PLEASE INDICATE BELOW HOW YOU ARE DOING:: 5
TURN_FAUCETS_OFF: WITHOUT ANY DIFFICULTY
IN_OUT_BED: WITHOUT ANY DIFFICULTY
SUM OF QUESTIONS A TO J: 2
WASH_DRY_BODY: WITHOUT ANY DIFFICULTY
WEIGHTED_TOTAL_SCORE: 3.9
SEVERITY_SCORE: MODERATE SEVERITY (MS)
ON A SCALE OF ONE TO TEN, CONSIDERING ALL THE WAYS IN WHICH ILLNESS AND HEALTH CONDITIONS MAY AFFECT YOU AT THIS TIME, PLEASE INDICATE BELOW HOW YOU ARE DOING:: 5
FN_SCORE: 0.7
LIFT_CUP_TO_MOUTH: WITHOUT ANY DIFFICULTY
FEELINGS_DEPRESSION: WITH SOME DIFFICULTY

## 2024-07-25 ASSESSMENT — ENCOUNTER SYMPTOMS
OCCASIONAL FEELINGS OF UNSTEADINESS: 0
DEPRESSION: 0
LOSS OF SENSATION IN FEET: 0

## 2024-07-25 ASSESSMENT — PATIENT HEALTH QUESTIONNAIRE - PHQ9
2. FEELING DOWN, DEPRESSED OR HOPELESS: NOT AT ALL
1. LITTLE INTEREST OR PLEASURE IN DOING THINGS: NOT AT ALL
SUM OF ALL RESPONSES TO PHQ9 QUESTIONS 1 AND 2: 0

## 2024-07-25 ASSESSMENT — PAIN SCALES - GENERAL: PAINLEVEL_OUTOF10: 6

## 2024-07-25 ASSESSMENT — PAIN - FUNCTIONAL ASSESSMENT: PAIN_FUNCTIONAL_ASSESSMENT: 0-10

## 2024-07-25 NOTE — PROGRESS NOTES
[facility]/  Rheumatology  [facility], [address]  [place] [address]  Phone: [phone]  Fax: [phone]    Rheumatology Progress Note 7/25/24    Elma Cleveland is a 39 y.o. female here for   Chief Complaint   Patient presents with    Follow up with labs       Last Visit: 3/14/24    Rheum Hx  Referred by for: CRP ELEVATED/FOOT PAIN/ JOINT  PAIN/MALAISE AND FATIGUE/NUMBNESS  Patient has seen multiple rheumatologists including Dr. Crespo,  Dr. Caballero, and Dr. Mercado.  Per available records review Dr. Caballero diagnosed patient with  question of undifferentiated connective tissue disease. Patient has  a history of joint pains and high EVELIN at the age of 16 years old.  Found to have RNP slightly elevated. Negative HLA B27, RF, CCP,  Anca, CRP, uric acid, CBC, SPEP, LAC  Patient also saw Dr. Kumar who diagnosed her with chronic  pain, likely undifferentiated connective tissue disease with positive  EVELIN low RNP and Hashimoto's history  It looks a patient has been treated with non steroid antiinflammatory  drug and narcotics.  Patient apparently has also seen Dermatology but  unfortunately could not tolerate the Xyzal. She had a full body rash  with Plaquenil.  Chief complaint: Joint pain, muscle tightness  Since 16 years old  Slow onset  Intermittent course  Precipitating factors: None  Associated symptoms: Pain in joints, night sweats, low-grade  fevers, tiredness  Better: Aleve, sleep  Worse: Stress  Previous treatments naproxen-somewhat helpful  Ibuprofen-somewhat helpful  Meloxicam-no help  Diclofenac-no help  Celecoxib-no help  Acetaminophen-no help  Steroids-very helpful  Plaquenil-discontinued due to rash  Occupation: RN    Previous Rheum note reviewed. Dx with UCTD as a teenager. Low + EVELIN and RNP. Diagnosed by Dr Caballero. Mother Dx sith SLE. Then saw Dr Kumar and Dr Layne.  EVELIN 1:80 2016  Chronic urticaria/Multiple allergies/Asthma  LAD, s/p bx- benign  PCOS  Benign joint  hypermobility.   Hypothyroidism  Since 16 y/o after mono, everything snowballed from there.  Patient has had multiple to and including joint aches, infection  once a year bronchitis that will not go way. States nothing has been  done. 3 days full (not usually) body rash treated with steroids for  over a month.  Usual have rash is high pinpoint nonpruritic. Patient saw  allergist and told she is allergic to life. Patient takes Zyrtec for day  to help with the itchiness and rashes.  Does not have PCOS. Patient cannot take birth control has  makes her sick. Off Aldactone notes horrible acne and cystic acne.  Currently tenderness of bilateral elbows- left elbow is always  the worst, upper spine and lower spine, left knee and cas 3rd 4th  PIPs  No noted swelling  30 minutes to 1 hour of morning stiffness feels muscle  tightness that never seems to go away  Has had deep tissue massage 2-3/wk told very tight knots  She has never tried muscle relaxant or acupuncture.  History of right tennis elbow  Severe back pain more recently tightness lower back and hips  sometimes arms. Notes response in past to Synthroid.  Review of systems positive for:  Fatigue  LN >1 yr- biopsy normal per pt- ?reactive lymphoid  hyperplasia  Dry eyes, constant dry nose  Canker sores not as freq  R ear pulsatile tinnitus/MRA MRI unremarkable; ? scar tissue  +HSV  History of lung disease- Asthma cough variant, not well  controlled  Vocal cord dysfunction due to reflux- Pepcid has almost fixed  it; pantoprazole not working as well; to see GI  Alb incrased heart rate; on Xopenex and alb neb prn  Echo- slight pericardial thickening, ?elevated RVSP; hx of  vasovagal syncope from stomach pain  Gastroesophageal reflux disease  Abdominal pain/ prominent Peyer's patches/ no  gallbladder/dumping syndrome/ Bentyl or Levsin sometimes help  History of kidney stone  Rashes- hormonla; increased DHEAS; no adrenal hyperplasia;  cysts that come and go; cortisol test  done during her night shift  work  Hx of 3 L knee surgeries  Joint pain  Morning stiffness  Back pain that wakes her up from sleep approved upon getting  up and with activity and with rest  Myalgias  Hx of concussion  Weakness right foot- Saw Neuro and did PT up and down; MRI  spine showed herniated disc; EMP upper arm skin couldn't even  feel it.; nystagmus and tiredness  Frequent infections including bacterial vaginosis, trichomonas  History of thyroid disease- +TPO, very symptomatci in 2014  and at TSH level 5; will be following with Endo  Allergies  Depression  Component      Latest Ref Rng 5/22/2018 12/3/2019   Tissue Transglutaminase, IgA      0 - 14  <1     Tissue Transglutamase IgG      0 - 14  <1     DEAMIDATED GLIADIN PEPTIDE IGA      0 - 14  1     DEAMIDATED GLIADIN PEPTIDE IGG      0 - 14  <1     Mononucleosis Screen      NEGATIVE   NEGATIVE      Component      Latest Ref Rng 9/14/2020 12/8/2020   EVELIN      NEGATIVE  POSITIVE !     EVELIN Titer 1:80     EVELIN Pattern HOMOGENEOUS     Anti-SM      AI <0.2     Anti-RNP      AI 2.0 !     Anti-SM/RNP      AI <0.2     Anti-SSA      AI <0.2     Anti-SSB      AI <0.2     Anti-SCL-70      AI <0.2     Anti-RANDY-1 IgG      AI <0.2     Anti-Chromatin      AI <0.2     Anti-Centromere      AI <0.2     ANTI-RIBOSOMAL P      AI <0.2     Anti-DNA (DS)      IU/mL 1.0     C-ANCA (PR3) BY EIA  (Note)…    C-ANCA FLOURESCENCE  Negative…    P-ANCA (MPO) BY EIA  (Note)…    P-ANCA FLOURESCENCE  Negative…    ANCP INTERPRETATION  (Note)…    Staff Review  (Note)…    IgG Cardiolipin Ab  <9.0…    IgM Cardiolipin Ab  20.8… (H)    IgA Cardiolipin Ab  11.2…    Beta-2 Glyco 1 IgG  <9…    Beta 2 Glyco 1 IgM  <9…    Angiotensin 1 Conv  26…    Beta-2 Glyco 1 IgA  1…    Centromere Ab  Negative…      Component      Latest Ref Rng 3/17/2021 8/4/2022   IgG Cardiolipin Ab <9.0…     IgM Cardiolipin Ab 10.1…     IgA Cardiolipin Ab 12.4… (H)     PTT-LA  37.0…    DRVVT  37.1…    Lupus Anticoagulant  Interpretation  No LAC   SSA ANTIBODIES <0.2…     SSB ANTIBODIES <0.2…          Previous Tx  naproxen-somewhat helpful  Ibuprofen-somewhat helpful  Meloxicam-no help  Diclofenac-no help  Celecoxib-no help  Acetaminophen-no help  Steroids-very helpful  Plaquenil-discontinued due to rash    Health Maintenance  DXA- none  Malignancy Hx- none  Component      Latest Ref Rng 4/25/2019 12/3/2019 12/8/2020   Herpes Simplex I IgG <0.2…      HSV 1, IgG Negative…      Herpes Simplex 2 IgG <0.2…      HSV 2, IgG Negative…      HERPES SIMPLEX IGM 0.79…      HSV IgM Qualitative Negative…      Mononucleosis Screen      NEGATIVE   NEGATIVE     Hepatitis B Surface AG      NEG    NEGATIVE    Hepatitis C AB   Negative…    Lyme Antibodies Screen   Negative…      Immunization History   Administered Date(s) Administered    AS03 Adjuvant 09/23/2021    Flu vaccine (IIV4), preservative free *Check age/dose* 09/15/2019, 10/01/2020    Flu vaccine, quadrivalent, high-dose, preservative free, age 65y+ (FLUZONE) 09/23/2021    Flu vaccine, quadrivalent, no egg protein, age 6 month or greater (FLUCELVAX) 11/01/2023    Influenza, Unspecified 10/19/2011, 09/26/2012, 10/04/2013, 11/04/2014, 09/26/2017, 10/20/2022, 10/12/2023    Influenza, seasonal, injectable 10/22/2013, 10/30/2019    Influenza, trivalent, adjuvanted 10/14/2022    MMR vaccine, subcutaneous (MMR II) 09/01/1998    Moderna COVID-19 vaccine, Fall 2023, 12 yeasrs and older (50mcg/0.5mL) 11/13/2023    Moderna COVID-19 vaccine, bivalent, blue cap/gray label *Check age/dose* 11/12/2022    Moderna SARS-CoV-2 Vaccination 05/28/2021, 06/25/2021, 02/04/2022    PPD Test 12/25/2010, 04/04/2011    Tdap vaccine, age 7 year and older (BOOSTRIX, ADACEL) 09/16/2019    Zoster vaccine, recombinant, adult (SHINGRIX) 08/15/2022          Past Medical History:   Diagnosis Date    Acute vaginitis 02/01/2020    Vaginitis and vulvovaginitis    Anesthesia of skin 10/03/2017    Right arm numbness    Carpal tunnel  syndrome, left upper limb 12/20/2016    Left carpal tunnel syndrome    Concussion with loss of consciousness of unspecified duration, subsequent encounter     Concussion with loss of consciousness of unspecified duration, subsequent encounter    Diarrhea, unspecified 11/08/2018    Acute diarrhea    Dry eye syndrome of bilateral lacrimal glands     Insufficiency of tear film of both eyes    Encounter for surveillance of contraceptive pills 04/25/2018    Encounter for birth control pills maintenance    Hashimoto's disease     Headache, unspecified 07/05/2019    Mixed headache    Hypo-osmolality and hyponatremia 04/05/2020    Hyponatremia    Immunization not carried out because of patient refusal 07/05/2019    Influenza vaccination declined    Left lower quadrant abdominal tenderness 08/22/2017    Abdominal left lower quadrant tenderness    Left lower quadrant pain 11/17/2016    Colicky LLQ abdominal pain    Other conditions influencing health status 08/22/2017    History of chronic diarrhea    Other intervertebral disc displacement, lumbar region     Herniated lumbar disc without myelopathy    Other malaise 09/10/2020    Malaise and fatigue    Other specified noninflammatory disorders of vagina     Vaginal lesion    Other symptoms and signs involving the musculoskeletal system 10/04/2018    Weakness of right foot    Other symptoms and signs involving the musculoskeletal system 09/28/2018    Weakness of right foot    Other symptoms and signs involving the musculoskeletal system 10/02/2018    Weakness of right lower extremity    Pain in right knee 04/25/2018    Right knee pain    Pain in unspecified foot 09/10/2020    Foot pain    Pelvic and perineal pain 06/05/2020    Pelvic pain    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    Personal history of diseases of the blood and blood-forming organs and certain disorders involving the immune mechanism     History of autoimmune  disorder    Personal history of diseases of the skin and subcutaneous tissue 06/10/2016    History of allergic urticaria    Personal history of other benign neoplasm     History of adenoma of adrenal gland    Personal history of other diseases of the circulatory system 10/25/2016    History of irregular heartbeat    Personal history of other diseases of the digestive system 07/05/2019    History of left inguinal hernia    Personal history of other diseases of the digestive system 02/28/2018    History of hemorrhoids    Personal history of other diseases of the female genital tract 05/22/2018    History of menorrhagia    Personal history of other diseases of the musculoskeletal system and connective tissue 09/02/2020    History of joint pain    Personal history of other diseases of the musculoskeletal system and connective tissue 09/10/2020    History of joint pain    Personal history of other diseases of the respiratory system 02/28/2018    History of acute sinusitis    Personal history of other diseases of the respiratory system     History of asthma    Personal history of other diseases of the respiratory system 12/21/2016    History of acute sinusitis    Personal history of other endocrine, nutritional and metabolic disease 04/06/2020    History of hyperkalemia    Personal history of other infectious and parasitic diseases     History of mononucleosis    Personal history of other specified conditions 07/05/2019    History of nausea    Personal history of other specified conditions 03/27/2019    History of palpitations    Personal history of other specified conditions 08/22/2017    History of urinary frequency    Personal history of other specified conditions 08/12/2021    History of dizziness    Personal history of other specified conditions 07/05/2019    History of headache    Personal history of other specified conditions 09/10/2020    History of numbness    Personal history of other specified conditions  12/30/2016    History of weight gain    Personal history of traumatic brain injury 06/04/2018    History of concussion    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)     Rash and other nonspecific skin eruption 06/10/2016    Rash of unknown cause    Right upper quadrant pain 12/03/2019    Right upper quadrant abdominal pain    Syncope     Syncope and collapse 02/28/2018    Vasovagal near syncope    Tremor, unspecified 12/19/2016    Tremor of right hand    Unspecified abdominal pain 12/12/2019    Abdominal pain of unknown etiology    Unspecified asthma with (acute) exacerbation ([facility]) 02/28/2018    Acute asthma exacerbation    Unspecified symptoms and signs involving the genitourinary system 12/05/2019    UTI symptoms      Past Surgical History:   Procedure Laterality Date    ADENOIDECTOMY  10/24/2016    Adenoidectomy    GALLBLADDER SURGERY  06/10/2016    Gallbladder Surgery    HERNIA REPAIR  06/10/2016    Hernia Repair    KNEE SURGERY Left 06/10/2016    2001, 2007, 2008    OTHER SURGICAL HISTORY  10/24/2016    Deep Cervical Lymph Node Biopsy    TONSILLECTOMY  10/24/2016    Tonsillectomy      Current Outpatient Medications   Medication Sig Dispense Refill    acyclovir (Zovirax) 5 % ointment 1 Application.      celecoxib (CeleBREX) 400 mg capsule Take 1 capsule (400 mg) by mouth once daily with breakfast.      certolizumab pegol (Cimzia) injection INJECT 2 SYRINGES (400MG) UNDER THE SKIN ONCE EVERY 4 WEEKS. 2 mL 11    cetirizine (ZyrTEC) 10 mg tablet 1 (one) time each day at the same time.      cholecalciferol (Vitamin D-3) 50 MCG (2000 UT) tablet Take 1 tablet (2,000 Units) by mouth once daily.      cyclobenzaprine (Flexeril) 10 mg tablet Take 1 tablet (10 mg) by mouth once daily at bedtime. PRN      levalbuterol (Xopenex HFA) 45 mcg/actuation inhaler Inhale 2 puffs every 6 hours.      mometasone (Nasonex) 50 mcg/actuation nasal spray Administer into affected nostril(s).      thyroid, pork, ([place]) 30  mg tablet 1 TABLET DAILY (Patient taking differently: Take 1 tablet (30 mg) by mouth once daily. Pt states she takes NP thyroid) 90 tablet 3    thyroid, pork, 15 mg tablet TAKE 1 TABLET (15 MG) BY MOUTH IN THE MORNING. TAKE WITH THE 30 MG TAB. 90 tablet 3    valACYclovir (Valtrex) 500 mg tablet Take 1 tablet (500 mg) by mouth 2 times a day as needed (OUTBREAK).      amphetamine-dextroamphetamine (Adderall) 15 mg tablet Take 1 tablet (15 mg) by mouth once daily. 30 tablet 0    famotidine (Pepcid) 10 mg tablet       hydrOXYzine HCL (Atarax) 25 mg tablet Take 1 tablet (25 mg) by mouth 3 times a day as needed for itching.      methocarbamol (Robaxin) 500 mg tablet Take by mouth.      metoprolol succinate XL (Toprol-XL) 25 mg 24 hr tablet Take 1 tablet (25 mg) by mouth early in the morning.. Do not crush or chew..      sulfaSALAzine (Azulfidine) 500 mg tablet TAKE 1 TABLET BY MOUTH TWICE A DAY FOR 30 DAYS 180 tablet 3     No current facility-administered medications for this visit.      Allergies   Allergen Reactions    Ciprofloxacin Other     Reaction: Vomiting    Codeine Sulfate Other     NIGHTMARES AND SLEEPWALKING    Hydroxychloroquine Rash     Full body rash    Oxycodone-Acetaminophen Itching     full body itching    Tuberculin Ppd Unknown and Rash     ALLERGY TO BOTH APLISOL AND TUBERSOL CAUSING REDNESS, RASH AND IRRITATION UNDER SKIN.  ANNUAL SCREENING IS A TB GOLD.    Codeine Hives    Gum Ehomop-Efuqrw-Wmll-Alcohol Unknown    Sodium Lauroylsarcosinate Unknown    Sodium Lauryl Sarcosinate Unknown    Adhesive Tape-Silicones Rash    Blue Dye Hives and Rash    Dibucaine Hives and Rash     Other Reaction(s): Unknown    Gold Keratinate Hives and Rash    Gold Sodium Thiosulfate Rash    Lidocaine Hives and Rash    Linalool Hives and Rash    Nickel Hives and Rash    Sodium Laureth Sulfate Rash, Unknown and Hives    Sodium Thiosulfate Rash    Thimerosal Hives and Rash    Wound Dressings Hives and Rash     Other  Reaction(s): hives    Yellow Dye Hives and Rash      Visit Vitals  /80 (BP Location: Left arm, Patient Position: Sitting)   Pulse 84   Ht 1.524 m (5')   Wt 76.2 kg (168 lb)   SpO2 100%   BMI 32.81 kg/m²   OB Status Unknown   Smoking Status Never   BSA 1.8 m²        Pain Assessment 0-10 (Numeric) Pain Score: 6, Pain Location: Hand (both hands and wrists)     Rapid 3  Function Score (FN): 0.7  Pain Score (PN) (0-10): 6  Patient Global (PTGL) (0-10): 5  Rapid3 Score: 11.7      Workup  Component      Latest Ref Rn 7/2/2024   LEUKOCYTES (10*3/UL) IN BLOOD BY AUTOMATED COUNT, [place]      4.4 - 11.3 x10*3/uL 6.7    nRBC      0.0 - 0.0 /100 WBCs 0.0    ERYTHROCYTES (10*6/UL) IN BLOOD BY AUTOMATED COUNT, [place]      4.00 - 5.20 x10*6/uL 4.40    HEMOGLOBIN      12.0 - 16.0 g/dL 13.7    HEMATOCRIT      36.0 - 46.0 % 40.8    MCV      80 - 100 fL 93    MCH      26.0 - 34.0 pg 31.1    MCHC      32.0 - 36.0 g/dL 33.6    RED CELL DISTRIBUTION WIDTH      11.5 - 14.5 % 11.7    PLATELETS (10*3/UL) IN BLOOD AUTOMATED COUNT, [place]      150 - 450 x10*3/uL 254    NEUTROPHILS/100 LEUKOCYTES IN BLOOD BY AUTOMATED COUNT, [place]      40.0 - 80.0 % 44.8    Immature Granulocytes %, Automated      0.0 - 0.9 % 0.0    Lymphocytes %      13.0 - 44.0 % 44.2    Monocytes %      2.0 - 10.0 % 8.6    Eosinophils %      0.0 - 6.0 % 1.5    Basophils %      0.0 - 2.0 % 0.9    NEUTROPHILS (10*3/UL) IN BLOOD BY AUTOMATED COUNT, [place]      1.20 - 7.70 x10*3/uL 3.01    Immature Granulocytes Absolute, Automated      0.00 - 0.70 x10*3/uL 0.00    Lymphocytes Absolute      1.20 - 4.80 x10*3/uL 2.97    Monocytes Absolute      0.10 - 1.00 x10*3/uL 0.58    Eosinophils Absolute      0.00 - 0.70 x10*3/uL 0.10    Basophils Absolute      0.00 - 0.10 x10*3/uL 0.06    GLUCOSE      74 - 99 mg/dL 96    SODIUM      136 - 145 mmol/L 138    POTASSIUM      3.5 - 5.3 mmol/L 4.0    CHLORIDE      98 - 107 mmol/L 101    Bicarbonate      21 - 32 mmol/L 28    Anion  Gap      10 - 20 mmol/L 13    Blood Urea Nitrogen      6 - 23 mg/dL 18    Creatinine      0.50 - 1.05 mg/dL 0.89    EGFR      >60 mL/min/1.73m*2 85    Calcium      8.6 - 10.6 mg/dL 9.7    Albumin      3.4 - 5.0 g/dL 4.2    Alkaline Phosphatase      33 - 110 U/L 58    Total Protein      6.4 - 8.2 g/dL 6.8    AST      9 - 39 U/L 11    Bilirubin Total      0.0 - 1.2 mg/dL 0.3    ALT      7 - 45 U/L 11    Color, Urine      Light-Yellow, Yellow, Dark-Yellow  Colorless ! (N)    Appearance, Urine      Clear  Clear    Specific Gravity, Urine      1.005 - 1.035  1.010    pH, Urine      5.0, 5.5, 6.0, 6.5, 7.0, 7.5, 8.0  6.5    Protein, Urine      NEGATIVE, 10 (TRACE), 20 (TRACE) mg/dL NEGATIVE    Glucose, Urine      Normal mg/dL Normal    Blood, Urine      NEGATIVE  NEGATIVE    Ketones, Urine      NEGATIVE mg/dL NEGATIVE    Bilirubin, Urine      NEGATIVE  NEGATIVE    Urobilinogen, Urine      Normal mg/dL Normal    Nitrite, Urine      NEGATIVE  NEGATIVE    Leukocyte Esterase, Urine      NEGATIVE  NEGATIVE    C-Reactive Protein      <1.00 mg/dL 0.31    Creatine Kinase      0 - 215 U/L 41    Scan Result Vectra 44 (moderate)   Vitamin D, 25-Hydroxy, Total      30 - 100 ng/mL 29 (L)    Sed Rate      0 - 20 mm/h 9    Extra Tube Hold for add-ons.               Assessment/Plan  1. 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)    2. Hashimoto's thyroiditis    3. Hypermobile Doug-Danlos syndrome (HHS-HCC)    4. Vitamin D deficiency    5. Encounter for long-term (current) use of medications    6. Positive EVELIN (antinuclear antibody)           Orders Placed This Encounter   Procedures    14.3.3; [facility]; 054237 - Miscellaneous Test    Albumin-Creatinine Ratio, Urine Random    Anti-DNA Antibody, Double-Stranded    C1Q Complement    C3 Complement    C4 Complement    CBC and Auto Differential    Comprehensive Metabolic Panel    C-Reactive Protein    Creatine Kinase    Creatinine, Urine Random    Sedimentation Rate    Vectra;  [facility]; 602824 - Miscellaneous Test    Urinalysis with Reflex Culture and Microscopic    Vitamin D 25-Hydroxy,Total (for eval of Vitamin D levels)    Urine Protein Electrophoresis + Immunofixation    Serum Protein Electrophoresis + Immunofixation    Angiotensin Converting Enzyme    Vitamin D 1,25 Dihydroxy (for eval of hypercalcemia)    Anti-Cardiolipin Antibody (IgA, IgG, and IgM)    Lupus Anticoag. With Interpretation[Goyal]            Since last appt, adherent and tolerating Cimzia  SSZ- ?flusihing of face versus rash  Celebrex- trying not take as much; works for her menstrual pain or for sleeping better if having pain.  Not the typical back pain that she had before.   Hands, wrists, elbows, feet hurt. Feesl it throughout the day and by the end of the day.  AM stiffness about 10 min and  ok  Denies any recent or current infection.  Not on any glucocorticoids.  ROS+ for fatigue, sicca, rashes, joint pain.  Feet and heels jurt when when she gets up after sitting or sleeping.   Rapid 3 consistent with moderate severity  Labs reviewed  D/w pt tx options   Advised of possible side effects and importance of monitoring.   All questions answered.  Patient to follow up with primary care provider regarding all other medical issues not addressed today and for medical chart updating.     Genesis Bauer MD      Patient Care Team:  Margaret Gould DO as PCP - General (Family Medicine)  Margaret Gould DO as PCP - Employee [employer] PCP  Marisela Aguilar DO as Primary Care Provider  Genesis Bauer MD as Consulting Physician (Rheumatology)

## 2024-07-26 DIAGNOSIS — E06.3 HASHIMOTO'S THYROIDITIS: ICD-10-CM

## 2024-07-26 RX ORDER — THYROID 30 MG/1
30 TABLET ORAL DAILY
Qty: 90 TABLET | Refills: 3 | Status: SHIPPED | OUTPATIENT
Start: 2024-07-26 | End: 2024-10-24

## 2024-07-26 RX ORDER — THYROID 15 MG/1
15 TABLET ORAL DAILY
Qty: 90 TABLET | Refills: 3 | Status: SHIPPED | OUTPATIENT
Start: 2024-07-26 | End: 2024-10-24

## 2024-07-26 NOTE — TELEPHONE ENCOUNTER
"PT CALLED ASKING FOR NP THYROID TO BE SENT TO [facility] IN [place]. STATES PCP SENT \"REGULAR\" THYROID, BUT SHE HAS BEEN ON NP THYROID & YOU SAID YOU WOULD FILL THIS.  "

## 2024-08-12 ENCOUNTER — SPECIALTY PHARMACY (OUTPATIENT)
Dept: PHARMACY | Facility: CLINIC | Age: 39
End: 2024-08-12

## 2024-08-12 ENCOUNTER — PHARMACY VISIT (OUTPATIENT)
Dept: PHARMACY | Facility: CLINIC | Age: 39
End: 2024-08-12
Payer: COMMERCIAL

## 2024-08-12 PROCEDURE — RXMED WILLOW AMBULATORY MEDICATION CHARGE

## 2024-09-05 ENCOUNTER — SPECIALTY PHARMACY (OUTPATIENT)
Dept: PHARMACY | Facility: CLINIC | Age: 39
End: 2024-09-05

## 2024-09-05 PROCEDURE — RXMED WILLOW AMBULATORY MEDICATION CHARGE

## 2024-09-06 ENCOUNTER — PHARMACY VISIT (OUTPATIENT)
Dept: PHARMACY | Facility: CLINIC | Age: 39
End: 2024-09-06
Payer: COMMERCIAL

## 2024-09-19 ENCOUNTER — OFFICE VISIT (OUTPATIENT)
Dept: PRIMARY CARE | Facility: CLINIC | Age: 39
End: 2024-09-19
Payer: COMMERCIAL

## 2024-09-19 ENCOUNTER — PATIENT MESSAGE (OUTPATIENT)
Dept: PRIMARY CARE | Facility: CLINIC | Age: 39
End: 2024-09-19

## 2024-09-19 VITALS
HEART RATE: 70 BPM | OXYGEN SATURATION: 98 % | WEIGHT: 163.2 LBS | SYSTOLIC BLOOD PRESSURE: 120 MMHG | BODY MASS INDEX: 31.87 KG/M2 | DIASTOLIC BLOOD PRESSURE: 82 MMHG

## 2024-09-19 DIAGNOSIS — E06.3 HASHIMOTO'S THYROIDITIS: Primary | ICD-10-CM

## 2024-09-19 DIAGNOSIS — D89.89 AUTOIMMUNE DISORDER (MULTI): ICD-10-CM

## 2024-09-19 DIAGNOSIS — E28.8 HYPERANDROGENISM: ICD-10-CM

## 2024-09-19 DIAGNOSIS — A60.00 GENITAL HERPES SIMPLEX TYPE 2: ICD-10-CM

## 2024-09-19 DIAGNOSIS — J30.9 ALLERGIC RHINITIS, UNSPECIFIED SEASONALITY, UNSPECIFIED TRIGGER: ICD-10-CM

## 2024-09-19 DIAGNOSIS — K21.9 GASTROESOPHAGEAL REFLUX DISEASE WITHOUT ESOPHAGITIS: ICD-10-CM

## 2024-09-19 DIAGNOSIS — E55.9 VITAMIN D DEFICIENCY: ICD-10-CM

## 2024-09-19 DIAGNOSIS — A04.8 H. PYLORI INFECTION: ICD-10-CM

## 2024-09-19 DIAGNOSIS — E06.3 HASHIMOTO'S THYROIDITIS: ICD-10-CM

## 2024-09-19 DIAGNOSIS — Q79.62 HYPERMOBILE EHLERS-DANLOS SYNDROME (HHS-HCC): ICD-10-CM

## 2024-09-19 DIAGNOSIS — F90.9 ADULT ADHD: Primary | ICD-10-CM

## 2024-09-19 PROCEDURE — 1036F TOBACCO NON-USER: CPT | Performed by: PHYSICIAN ASSISTANT

## 2024-09-19 PROCEDURE — 99203 OFFICE O/P NEW LOW 30 MIN: CPT | Performed by: PHYSICIAN ASSISTANT

## 2024-09-19 RX ORDER — CLARITHROMYCIN 500 MG/1
500 TABLET, FILM COATED ORAL 2 TIMES DAILY
Qty: 28 TABLET | Refills: 0 | Status: SHIPPED | OUTPATIENT
Start: 2024-09-19 | End: 2024-10-03

## 2024-09-19 RX ORDER — OMEPRAZOLE 20 MG/1
20 CAPSULE, DELAYED RELEASE ORAL 2 TIMES DAILY
Qty: 28 CAPSULE | Refills: 0 | Status: SHIPPED | OUTPATIENT
Start: 2024-09-19 | End: 2024-10-03

## 2024-09-19 RX ORDER — AMOXICILLIN 500 MG/1
1000 CAPSULE ORAL 2 TIMES DAILY
Qty: 56 CAPSULE | Refills: 0 | Status: SHIPPED | OUTPATIENT
Start: 2024-09-19 | End: 2024-10-03

## 2024-09-19 ASSESSMENT — PATIENT HEALTH QUESTIONNAIRE - PHQ9
SUM OF ALL RESPONSES TO PHQ9 QUESTIONS 1 AND 2: 0
1. LITTLE INTEREST OR PLEASURE IN DOING THINGS: NOT AT ALL
2. FEELING DOWN, DEPRESSED OR HOPELESS: NOT AT ALL

## 2024-09-19 ASSESSMENT — ENCOUNTER SYMPTOMS
ABDOMINAL PAIN: 0
BLOOD IN STOOL: 0
DIARRHEA: 0
VOMITING: 0
SHORTNESS OF BREATH: 0
ARTHRALGIAS: 1
CONSTIPATION: 0
NAUSEA: 0

## 2024-09-19 ASSESSMENT — PAIN SCALES - GENERAL: PAINLEVEL: 2

## 2024-09-19 ASSESSMENT — COLUMBIA-SUICIDE SEVERITY RATING SCALE - C-SSRS
2. HAVE YOU ACTUALLY HAD ANY THOUGHTS OF KILLING YOURSELF?: NO
6. HAVE YOU EVER DONE ANYTHING, STARTED TO DO ANYTHING, OR PREPARED TO DO ANYTHING TO END YOUR LIFE?: NO
1. IN THE PAST MONTH, HAVE YOU WISHED YOU WERE DEAD OR WISHED YOU COULD GO TO SLEEP AND NOT WAKE UP?: NO

## 2024-09-19 NOTE — PROGRESS NOTES
Subjective   Patient ID: Elma Cleveland is a 39 y.o. female who presents for Establish Care (Pt is here to establish care / nr ).    HPI   PMHx  Hyperandrogenism - previously on spironolactone x 10-12 years to help w/hirsutism. Has been off it x 5 years, due to trying to conceive. Feeling okay without it.    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 Managed by rheumatology. On sulfasalazine, cimzia, celebrex, flexeril, autoimmune diet. Experiencing hives recently, plans on seeing allergist next week.    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 Tx w/pepcid and has not had significant sxs recently. Has levalbuterol that she uses rarely.    HSV - genital. Was having frequent flares x 3mos. Takes meds as needed. Topical acyclovir + oral valcyclovir    ADHD - has rx for adderall but makes her crash late. Adderall XR caused significant insomnia. Vyvanse caused raynauds. Strattera caused severe HA. Pt thinks she has POTS. While on vyvanse, HR spiked into 130s. Has metoprolol to take w/adderall to control HR. She had genesight testing that confirmed she could tolerate any of them, but this is not the case. Would like to see psychiatry at this time.    H. Pylori - did stool analysis and tested positive for H. Pylori.     HM: pap 3/2023 nml. CBC, CMP 7/2024 nml, lipid panel 5/2023 nml    Review of Systems   Eyes:         Dry eye   Respiratory:  Negative for shortness of breath.    Cardiovascular:  Negative for chest pain.   Gastrointestinal:  Negative for abdominal pain, blood in stool, constipation, diarrhea, nausea and vomiting.   Musculoskeletal:  Positive for arthralgias.   Skin:  Positive for rash.       Objective   /82   Pulse 70   Wt 74 kg (163 lb 3.2 oz)   SpO2 98%   BMI 31.87 kg/m²     Physical Exam  Constitutional:       Appearance: Normal appearance.   HENT:      Head: Normocephalic and atraumatic.   Eyes:       Extraocular Movements: Extraocular movements intact.      Conjunctiva/sclera: Conjunctivae normal.   Cardiovascular:      Rate and Rhythm: Normal rate and regular rhythm.      Heart sounds: Normal heart sounds.   Pulmonary:      Effort: Pulmonary effort is normal.      Breath sounds: Normal breath sounds. No wheezing or rhonchi.   Musculoskeletal:      Cervical back: Normal range of motion and neck supple.   Skin:     General: Skin is warm.      Findings: No rash.   Neurological:      General: No focal deficit present.      Mental Status: She is alert.         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 pylori infection     A04.8    Relevant Medications    clarithromycin (Biaxin) 500 mg tablet    omeprazole (PriLOSEC) 20 mg DR capsule    amoxicillin (Amoxil) 500 mg capsule    Other Relevant Orders    H. pylori antigen, stool

## 2024-09-22 ENCOUNTER — PATIENT MESSAGE (OUTPATIENT)
Dept: PRIMARY CARE | Facility: CLINIC | Age: 39
End: 2024-09-22
Payer: COMMERCIAL

## 2024-09-22 DIAGNOSIS — A04.8 H. PYLORI INFECTION: Primary | ICD-10-CM

## 2024-10-04 ENCOUNTER — PATIENT MESSAGE (OUTPATIENT)
Dept: PRIMARY CARE | Facility: CLINIC | Age: 39
End: 2024-10-04
Payer: COMMERCIAL

## 2024-10-04 ENCOUNTER — SPECIALTY PHARMACY (OUTPATIENT)
Dept: PHARMACY | Facility: CLINIC | Age: 39
End: 2024-10-04

## 2024-10-04 DIAGNOSIS — M06.09 RHEUMATOID ARTHRITIS WITHOUT RHEUMATOID FACTOR, MULTIPLE SITES (MULTI): ICD-10-CM

## 2024-10-04 RX ORDER — CERTOLIZUMAB PEGOL 200 MG/ML
INJECTION, SOLUTION SUBCUTANEOUS
Qty: 2 ML | Refills: 11 | Status: SHIPPED | OUTPATIENT
Start: 2024-10-04 | End: 2025-10-04

## 2024-10-07 ENCOUNTER — PHARMACY VISIT (OUTPATIENT)
Dept: PHARMACY | Facility: CLINIC | Age: 39
End: 2024-10-07
Payer: COMMERCIAL

## 2024-10-07 PROCEDURE — RXMED WILLOW AMBULATORY MEDICATION CHARGE

## 2024-10-11 ENCOUNTER — OFFICE VISIT (OUTPATIENT)
Dept: PRIMARY CARE | Facility: CLINIC | Age: 39
End: 2024-10-11
Payer: COMMERCIAL

## 2024-10-11 VITALS
BODY MASS INDEX: 31.41 KG/M2 | HEIGHT: 60 IN | HEART RATE: 77 BPM | DIASTOLIC BLOOD PRESSURE: 84 MMHG | WEIGHT: 160 LBS | SYSTOLIC BLOOD PRESSURE: 126 MMHG | OXYGEN SATURATION: 98 %

## 2024-10-11 DIAGNOSIS — F43.0 ACUTE STRESS REACTION: Primary | ICD-10-CM

## 2024-10-11 PROCEDURE — 99213 OFFICE O/P EST LOW 20 MIN: CPT | Performed by: PHYSICIAN ASSISTANT

## 2024-10-11 PROCEDURE — 1036F TOBACCO NON-USER: CPT | Performed by: PHYSICIAN ASSISTANT

## 2024-10-11 PROCEDURE — 3008F BODY MASS INDEX DOCD: CPT | Performed by: PHYSICIAN ASSISTANT

## 2024-10-11 RX ORDER — BUSPIRONE HYDROCHLORIDE 5 MG/1
5 TABLET ORAL 2 TIMES DAILY
Qty: 60 TABLET | Refills: 11 | Status: SHIPPED | OUTPATIENT
Start: 2024-10-11 | End: 2025-10-11

## 2024-10-11 ASSESSMENT — PAIN SCALES - GENERAL: PAINLEVEL: 0-NO PAIN

## 2024-10-11 ASSESSMENT — ENCOUNTER SYMPTOMS
CHEST TIGHTNESS: 1
NERVOUS/ANXIOUS: 1
TREMORS: 1

## 2024-10-11 NOTE — PROGRESS NOTES
Subjective   Patient ID: Elma Cleveland is a 39 y.o. female who presents for FMLA Paperwork.    HPI   Pt here to discuss anxiety/FMLA ppwk. Mom was recently diagnosed w/stage 4 ovarian CA. Pt has been experiencing intrusive thoughts, panic attacks w/sxs of hyperventilation, chest tightness, uncontrollable crying. She has never been on any anti-anxiety medications but is open to trying something. Would also like information for counseling.    Review of Systems   Respiratory:  Positive for chest tightness.    Neurological:  Positive for tremors.   Psychiatric/Behavioral:  The patient is nervous/anxious.        Objective   /84   Pulse 77   Ht 1.524 m (5')   Wt 72.6 kg (160 lb)   SpO2 98%   BMI 31.25 kg/m²     Physical Exam  Constitutional:       Appearance: Normal appearance.   HENT:      Head: Normocephalic and atraumatic.   Eyes:      Extraocular Movements: Extraocular movements intact.      Conjunctiva/sclera: Conjunctivae normal.   Cardiovascular:      Rate and Rhythm: Normal rate.   Pulmonary:      Effort: Pulmonary effort is normal.   Musculoskeletal:      Cervical back: Normal range of motion and neck supple.   Skin:     General: Skin is warm.      Findings: No rash.   Neurological:      General: No focal deficit present.      Mental Status: She is alert.         Assessment/Plan   Problem List Items Addressed This Visit    None  Visit Diagnoses         Codes    Acute stress reaction    -  Primary F43.0    Relevant Medications    busPIRone (Buspar) 5 mg tablet          Pt does tend to have reactions to medication so will start w/5mg buspar at bedtime. Can increase to BID or 10mg at bedtime if no relief w/5mg dose. FU 4-6 weeks for further dose adjustment. Local counselors list provided to pt.

## 2024-10-15 ENCOUNTER — APPOINTMENT (OUTPATIENT)
Dept: CARDIOLOGY | Facility: CLINIC | Age: 39
End: 2024-10-15
Payer: COMMERCIAL

## 2024-10-24 ENCOUNTER — PATIENT MESSAGE (OUTPATIENT)
Dept: RHEUMATOLOGY | Facility: CLINIC | Age: 39
End: 2024-10-24

## 2024-10-25 NOTE — PATIENT COMMUNICATION
I would monitor the LN over the next couple of months. Pt has appt in Dec, we can check then. If persists,hardens or enlarges, we can discuss re imaging/biopsy then. If she is otherwise ok, monitor for now.

## 2024-11-02 ENCOUNTER — SPECIALTY PHARMACY (OUTPATIENT)
Dept: PHARMACY | Facility: CLINIC | Age: 39
End: 2024-11-02

## 2024-11-04 ENCOUNTER — SPECIALTY PHARMACY (OUTPATIENT)
Dept: PHARMACY | Facility: CLINIC | Age: 39
End: 2024-11-04

## 2024-11-05 ENCOUNTER — PHARMACY VISIT (OUTPATIENT)
Dept: PHARMACY | Facility: CLINIC | Age: 39
End: 2024-11-05
Payer: COMMERCIAL

## 2024-11-05 PROCEDURE — RXMED WILLOW AMBULATORY MEDICATION CHARGE

## 2024-11-12 ENCOUNTER — APPOINTMENT (OUTPATIENT)
Dept: CARDIOLOGY | Facility: CLINIC | Age: 39
End: 2024-11-12
Payer: COMMERCIAL

## 2024-11-20 ENCOUNTER — LAB (OUTPATIENT)
Dept: LAB | Facility: LAB | Age: 39
End: 2024-11-20
Payer: COMMERCIAL

## 2024-11-20 DIAGNOSIS — M06.09 POLYARTHRITIS WITH NEGATIVE RHEUMATOID FACTOR (MULTI): ICD-10-CM

## 2024-11-20 DIAGNOSIS — R76.8 POSITIVE ANA (ANTINUCLEAR ANTIBODY): ICD-10-CM

## 2024-11-20 DIAGNOSIS — E55.9 VITAMIN D DEFICIENCY: ICD-10-CM

## 2024-11-20 LAB
25(OH)D3 SERPL-MCNC: 22 NG/ML (ref 30–100)
ALBUMIN SERPL BCP-MCNC: 4.3 G/DL (ref 3.4–5)
ALP SERPL-CCNC: 70 U/L (ref 33–110)
ALT SERPL W P-5'-P-CCNC: 15 U/L (ref 7–45)
ANION GAP SERPL CALC-SCNC: 13 MMOL/L (ref 10–20)
APPEARANCE UR: CLEAR
AST SERPL W P-5'-P-CCNC: 15 U/L (ref 9–39)
BASOPHILS # BLD AUTO: 0.06 X10*3/UL (ref 0–0.1)
BASOPHILS NFR BLD AUTO: 1.1 %
BILIRUB SERPL-MCNC: 0.4 MG/DL (ref 0–1.2)
BILIRUB UR STRIP.AUTO-MCNC: NEGATIVE MG/DL
BUN SERPL-MCNC: 13 MG/DL (ref 6–23)
C3 SERPL-MCNC: 133 MG/DL (ref 87–200)
C4 SERPL-MCNC: 24 MG/DL (ref 10–50)
CALCIUM SERPL-MCNC: 9.7 MG/DL (ref 8.6–10.6)
CARDIOLIPIN IGA SERPL-ACNC: 1.1 APL U/ML
CARDIOLIPIN IGG SER IA-ACNC: <1.6 GPL U/ML
CARDIOLIPIN IGM SER IA-ACNC: 5.1 MPL U/ML
CHLORIDE SERPL-SCNC: 104 MMOL/L (ref 98–107)
CK SERPL-CCNC: 33 U/L (ref 0–215)
CO2 SERPL-SCNC: 25 MMOL/L (ref 21–32)
COLOR UR: ABNORMAL
CREAT SERPL-MCNC: 0.74 MG/DL (ref 0.5–1.05)
CREAT UR-MCNC: 132 MG/DL (ref 20–320)
CRP SERPL-MCNC: 0.4 MG/DL
DSDNA AB SER-ACNC: 14 IU/ML
EGFRCR SERPLBLD CKD-EPI 2021: >90 ML/MIN/1.73M*2
EOSINOPHIL # BLD AUTO: 0.08 X10*3/UL (ref 0–0.7)
EOSINOPHIL NFR BLD AUTO: 1.4 %
ERYTHROCYTE [DISTWIDTH] IN BLOOD BY AUTOMATED COUNT: 11.9 % (ref 11.5–14.5)
ERYTHROCYTE [SEDIMENTATION RATE] IN BLOOD BY WESTERGREN METHOD: 12 MM/H (ref 0–20)
GLUCOSE SERPL-MCNC: 86 MG/DL (ref 74–99)
GLUCOSE UR STRIP.AUTO-MCNC: NORMAL MG/DL
HCT VFR BLD AUTO: 41.5 % (ref 36–46)
HGB BLD-MCNC: 13.5 G/DL (ref 12–16)
HOLD SPECIMEN: NORMAL
IMM GRANULOCYTES # BLD AUTO: 0.01 X10*3/UL (ref 0–0.7)
IMM GRANULOCYTES NFR BLD AUTO: 0.2 % (ref 0–0.9)
KETONES UR STRIP.AUTO-MCNC: NEGATIVE MG/DL
LEUKOCYTE ESTERASE UR QL STRIP.AUTO: ABNORMAL
LYMPHOCYTES # BLD AUTO: 2.27 X10*3/UL (ref 1.2–4.8)
LYMPHOCYTES NFR BLD AUTO: 40.8 %
MCH RBC QN AUTO: 30.6 PG (ref 26–34)
MCHC RBC AUTO-ENTMCNC: 32.5 G/DL (ref 32–36)
MCV RBC AUTO: 94 FL (ref 80–100)
MICROALBUMIN UR-MCNC: <7 MG/L
MICROALBUMIN/CREAT UR: NORMAL MG/G{CREAT}
MONOCYTES # BLD AUTO: 0.45 X10*3/UL (ref 0.1–1)
MONOCYTES NFR BLD AUTO: 8.1 %
MUCOUS THREADS #/AREA URNS AUTO: NORMAL /LPF
NEUTROPHILS # BLD AUTO: 2.7 X10*3/UL (ref 1.2–7.7)
NEUTROPHILS NFR BLD AUTO: 48.4 %
NITRITE UR QL STRIP.AUTO: NEGATIVE
NRBC BLD-RTO: 0 /100 WBCS (ref 0–0)
PH UR STRIP.AUTO: 5.5 [PH]
PLATELET # BLD AUTO: 281 X10*3/UL (ref 150–450)
POTASSIUM SERPL-SCNC: 4.1 MMOL/L (ref 3.5–5.3)
PROT SERPL-MCNC: 6.8 G/DL (ref 6.4–8.2)
PROT SERPL-MCNC: 6.8 G/DL (ref 6.4–8.2)
PROT UR STRIP.AUTO-MCNC: NEGATIVE MG/DL
PROT UR-ACNC: 4 MG/DL (ref 5–25)
RBC # BLD AUTO: 4.41 X10*6/UL (ref 4–5.2)
RBC # UR STRIP.AUTO: NEGATIVE /UL
RBC #/AREA URNS AUTO: NORMAL /HPF
SODIUM SERPL-SCNC: 138 MMOL/L (ref 136–145)
SP GR UR STRIP.AUTO: 1.02
SQUAMOUS #/AREA URNS AUTO: NORMAL /HPF
UROBILINOGEN UR STRIP.AUTO-MCNC: NORMAL MG/DL
WBC # BLD AUTO: 5.6 X10*3/UL (ref 4.4–11.3)
WBC #/AREA URNS AUTO: NORMAL /HPF

## 2024-11-20 PROCEDURE — 82652 VIT D 1 25-DIHYDROXY: CPT

## 2024-11-20 PROCEDURE — 85025 COMPLETE CBC W/AUTO DIFF WBC: CPT

## 2024-11-20 PROCEDURE — 85652 RBC SED RATE AUTOMATED: CPT

## 2024-11-20 PROCEDURE — 87086 URINE CULTURE/COLONY COUNT: CPT

## 2024-11-20 PROCEDURE — 82306 VITAMIN D 25 HYDROXY: CPT

## 2024-11-20 PROCEDURE — 86160 COMPLEMENT ANTIGEN: CPT

## 2024-11-20 PROCEDURE — 81001 URINALYSIS AUTO W/SCOPE: CPT

## 2024-11-20 PROCEDURE — 82164 ANGIOTENSIN I ENZYME TEST: CPT

## 2024-11-20 PROCEDURE — 36415 COLL VENOUS BLD VENIPUNCTURE: CPT

## 2024-11-21 LAB
ACE SERPL-CCNC: 32 U/L (ref 16–85)
BACTERIA UR CULT: NORMAL
DRVVT SCREEN TO CONFIRM RATIO: 0.77 RATIO
DRVVT/DRVVT CFM NRMLZD PPP-RTO: 0.89 RATIO
DRVVT/DRVVT CFM P DOAC NEUT NORM PPP-RTO: 0.86 RATIO
LA 2 SCREEN W REFLEX-IMP: NORMAL
NORMALIZED SCT PPP-RTO: 0.99 RATIO
SILICA CLOTTING TIME CONFIRMATION: 0.99 RATIO
SILICA CLOTTING TIME SCREEN: 0.98 RATIO

## 2024-11-22 ENCOUNTER — OFFICE VISIT (OUTPATIENT)
Dept: PRIMARY CARE | Facility: CLINIC | Age: 39
End: 2024-11-22
Payer: COMMERCIAL

## 2024-11-22 VITALS
BODY MASS INDEX: 31.83 KG/M2 | DIASTOLIC BLOOD PRESSURE: 84 MMHG | OXYGEN SATURATION: 97 % | WEIGHT: 163 LBS | HEART RATE: 88 BPM | SYSTOLIC BLOOD PRESSURE: 122 MMHG

## 2024-11-22 DIAGNOSIS — F43.0 ACUTE STRESS REACTION: ICD-10-CM

## 2024-11-22 DIAGNOSIS — R09.82 POSTNASAL DRIP: Primary | ICD-10-CM

## 2024-11-22 PROBLEM — R79.82 CRP ELEVATED: Status: RESOLVED | Noted: 2023-04-19 | Resolved: 2024-11-22

## 2024-11-22 PROBLEM — I49.9 IRREGULAR HEART BEATS: Status: RESOLVED | Noted: 2023-04-19 | Resolved: 2024-11-22

## 2024-11-22 PROBLEM — O09.529 ANTEPARTUM MULTIGRAVIDA OF ADVANCED MATERNAL AGE (HHS-HCC): Status: RESOLVED | Noted: 2023-08-30 | Resolved: 2024-11-22

## 2024-11-22 PROBLEM — L81.9 DISCOLORATION OF SKIN OF HAND: Status: RESOLVED | Noted: 2023-04-19 | Resolved: 2024-11-22

## 2024-11-22 PROBLEM — M25.562 LEFT KNEE PAIN: Status: RESOLVED | Noted: 2023-04-19 | Resolved: 2024-11-22

## 2024-11-22 PROBLEM — R29.2 HYPER REFLEXIA: Status: RESOLVED | Noted: 2023-04-19 | Resolved: 2024-11-22

## 2024-11-22 PROBLEM — K52.9 CHRONIC DIARRHEA: Status: RESOLVED | Noted: 2024-03-12 | Resolved: 2024-11-22

## 2024-11-22 PROBLEM — Z86.39 HISTORY OF HASHIMOTO THYROIDITIS: Status: RESOLVED | Noted: 2022-02-23 | Resolved: 2024-11-22

## 2024-11-22 PROBLEM — Z86.2 HISTORY OF IMMUNE DISORDER: Status: RESOLVED | Noted: 2024-03-12 | Resolved: 2024-11-22

## 2024-11-22 PROBLEM — N89.8 VAGINAL DISCHARGE: Status: RESOLVED | Noted: 2023-04-27 | Resolved: 2024-11-22

## 2024-11-22 PROBLEM — M24.9 HYPERMOBILITY OF JOINT: Status: RESOLVED | Noted: 2024-03-12 | Resolved: 2024-11-22

## 2024-11-22 PROBLEM — H93.A1 OBJECTIVE PULSATILE TINNITUS OF RIGHT EAR: Status: RESOLVED | Noted: 2023-04-19 | Resolved: 2024-11-22

## 2024-11-22 PROBLEM — R73.03 PREDIABETES: Status: ACTIVE | Noted: 2024-11-22

## 2024-11-22 PROBLEM — M79.18 MYOFASCIAL PAIN: Status: RESOLVED | Noted: 2023-04-19 | Resolved: 2024-11-22

## 2024-11-22 PROBLEM — S06.0XAA CONCUSSION INJURY OF BODY STRUCTURE: Status: RESOLVED | Noted: 2024-03-12 | Resolved: 2024-11-22

## 2024-11-22 LAB
1,25(OH)2D SERPL-MCNC: 50.4 PG/ML (ref 19.9–79.3)
ALBUMIN MFR UR ELPH: 26.5 %
ALBUMIN: 4.3 G/DL (ref 3.4–5)
ALPHA 1 GLOBULIN: 0.3 G/DL (ref 0.2–0.6)
ALPHA 2 GLOBULIN: 0.6 G/DL (ref 0.4–1.1)
ALPHA1 GLOB MFR UR ELPH: 4.2 %
ALPHA2 GLOB MFR UR ELPH: 26.9 %
B-GLOBULIN MFR UR ELPH: 26.1 %
BETA GLOBULIN: 0.7 G/DL (ref 0.5–1.2)
GAMMA GLOB MFR UR ELPH: 16.3 %
GAMMA GLOBULIN: 1 G/DL (ref 0.5–1.4)
IMMUNOFIXATION COMMENT: NORMAL
IMMUNOFIXATION COMMENT: NORMAL
PATH REVIEW - SERUM IMMUNOFIXATION: NORMAL
PATH REVIEW - URINE IMMUNOFIXATION: NORMAL
PATH REVIEW-SERUM PROTEIN ELECTROPHORESIS: NORMAL
PATH REVIEW-URINE PROTEIN ELECTROPHORESIS: NORMAL
PROTEIN ELECTROPHORESIS COMMENT: NORMAL
URINE ELECTROPHORESIS COMMENT: NORMAL

## 2024-11-22 PROCEDURE — 99214 OFFICE O/P EST MOD 30 MIN: CPT | Performed by: PHYSICIAN ASSISTANT

## 2024-11-22 PROCEDURE — 1036F TOBACCO NON-USER: CPT | Performed by: PHYSICIAN ASSISTANT

## 2024-11-22 RX ORDER — IPRATROPIUM BROMIDE 21 UG/1
2 SPRAY, METERED NASAL EVERY 12 HOURS
Qty: 30 ML | Refills: 1 | Status: SHIPPED | OUTPATIENT
Start: 2024-11-22 | End: 2025-11-22

## 2024-11-22 ASSESSMENT — PATIENT HEALTH QUESTIONNAIRE - PHQ9
SUM OF ALL RESPONSES TO PHQ9 QUESTIONS 1 AND 2: 0
2. FEELING DOWN, DEPRESSED OR HOPELESS: NOT AT ALL
1. LITTLE INTEREST OR PLEASURE IN DOING THINGS: NOT AT ALL

## 2024-11-22 ASSESSMENT — ENCOUNTER SYMPTOMS
COUGH: 1
FATIGUE: 1
NERVOUS/ANXIOUS: 1
HEADACHES: 1
LIGHT-HEADEDNESS: 1
STRIDOR: 0
SHORTNESS OF BREATH: 0

## 2024-11-22 ASSESSMENT — COLUMBIA-SUICIDE SEVERITY RATING SCALE - C-SSRS
6. HAVE YOU EVER DONE ANYTHING, STARTED TO DO ANYTHING, OR PREPARED TO DO ANYTHING TO END YOUR LIFE?: NO
1. IN THE PAST MONTH, HAVE YOU WISHED YOU WERE DEAD OR WISHED YOU COULD GO TO SLEEP AND NOT WAKE UP?: NO
2. HAVE YOU ACTUALLY HAD ANY THOUGHTS OF KILLING YOURSELF?: NO

## 2024-11-22 ASSESSMENT — PAIN SCALES - GENERAL: PAINLEVEL_OUTOF10: 4

## 2024-11-22 NOTE — PROGRESS NOTES
Subjective   Patient ID: Elma Cleveland is a 39 y.o. female who presents for Follow-up (Pt is here for follow up on her medication / nr).    HPI   Pt here to discuss anxiety/FMLA ppwk. Mom was recently diagnosed w/stage 4 ovarian CA. Pt has been experiencing intrusive thoughts, panic attacks w/sxs of hyperventilation, chest tightness, uncontrollable crying. She has never been on any anti-anxiety medications but is open to trying something. Would also like information for counseling.    Pt has a lot of intolerable SE to medications so last visit we started her on low dose buspar at bedtime w/instructions to increase to 10mg at bedtime or 5mg BID if tolerating.    She was acutely ill w/URI about 5 weeks ago and has been experiencing lightheadedness, fatigue since then. She stopped the buspar for 5 days to see if sxs improved but they did not so she resumed it. Anxiety has been improving, does feel she could benefit from increased dose but has not increased it on her own yet. Having a difficult time scheduling counseling.    She is still experiencing dry cough due to illness. Thinks mostly related to postnasal drip. No shortness of breath or chest wheezing. Tested negative for covid. She is on 2 allergy medications and nasonex nasal spray. Does feel sxs are gradually improving.        Review of Systems   Constitutional:  Positive for fatigue.   HENT:  Positive for postnasal drip.    Respiratory:  Positive for cough. Negative for shortness of breath and stridor.    Neurological:  Positive for light-headedness and headaches.   Psychiatric/Behavioral:  The patient is nervous/anxious.        Objective   /84   Pulse 88   Wt 73.9 kg (163 lb)   SpO2 97%   BMI 31.83 kg/m²     Physical Exam  Constitutional:       Appearance: Normal appearance.   HENT:      Head: Normocephalic and atraumatic.   Eyes:      Extraocular Movements: Extraocular movements intact.      Conjunctiva/sclera: Conjunctivae normal.    Cardiovascular:      Rate and Rhythm: Normal rate and regular rhythm.      Heart sounds: Normal heart sounds.   Pulmonary:      Effort: Pulmonary effort is normal.      Breath sounds: Normal breath sounds. No wheezing or rhonchi.   Musculoskeletal:      Cervical back: Normal range of motion and neck supple.   Skin:     General: Skin is warm.      Findings: No rash.   Neurological:      General: No focal deficit present.      Mental Status: She is alert.         Assessment/Plan   Problem List Items Addressed This Visit    None  Visit Diagnoses         Codes    Postnasal drip    -  Primary R09.82    Relevant Medications    ipratropium (Atrovent) 21 mcg (0.03 %) nasal spray    Acute stress reaction     F43.0          Provided w/info on [facility] counseling. Advised to increase buspar to 2 tabs at bedtime, can further increase to 5mg am and 10mg pm if feels she could benefit from increased dose. Advised to schedule FU if she would like to discontinue or discuss alternative anxiety meds.    Lungs are clear, VSS. Will trial ipratropium nasal spray for postnasal drip.

## 2024-11-25 ENCOUNTER — SPECIALTY PHARMACY (OUTPATIENT)
Dept: PHARMACY | Facility: CLINIC | Age: 39
End: 2024-11-25

## 2024-11-25 PROCEDURE — RXMED WILLOW AMBULATORY MEDICATION CHARGE

## 2024-11-26 ENCOUNTER — PHARMACY VISIT (OUTPATIENT)
Dept: PHARMACY | Facility: CLINIC | Age: 39
End: 2024-11-26
Payer: COMMERCIAL

## 2024-12-01 LAB — C1Q SERPL-MCNC: 10.9 MG/DL (ref 10.3–20.5)

## 2024-12-02 LAB — SCAN RESULT: NORMAL

## 2024-12-03 LAB — SCAN RESULT: NORMAL

## 2024-12-04 ENCOUNTER — APPOINTMENT (OUTPATIENT)
Dept: RHEUMATOLOGY | Facility: CLINIC | Age: 39
End: 2024-12-04
Payer: COMMERCIAL

## 2024-12-05 LAB — SCAN RESULT: NORMAL

## 2024-12-10 ENCOUNTER — OFFICE VISIT (OUTPATIENT)
Dept: CARDIOLOGY | Facility: CLINIC | Age: 39
End: 2024-12-10
Payer: COMMERCIAL

## 2024-12-10 VITALS
SYSTOLIC BLOOD PRESSURE: 133 MMHG | OXYGEN SATURATION: 98 % | WEIGHT: 163 LBS | HEART RATE: 86 BPM | DIASTOLIC BLOOD PRESSURE: 80 MMHG | BODY MASS INDEX: 31.83 KG/M2

## 2024-12-10 DIAGNOSIS — R55 SYNCOPE, UNSPECIFIED SYNCOPE TYPE: ICD-10-CM

## 2024-12-10 DIAGNOSIS — Z01.810 PREOP CARDIOVASCULAR EXAM: Primary | ICD-10-CM

## 2024-12-10 PROCEDURE — 93005 ELECTROCARDIOGRAM TRACING: CPT | Performed by: NURSE PRACTITIONER

## 2024-12-10 PROCEDURE — 99213 OFFICE O/P EST LOW 20 MIN: CPT | Performed by: NURSE PRACTITIONER

## 2024-12-10 ASSESSMENT — ENCOUNTER SYMPTOMS
HEMATOLOGIC/LYMPHATIC NEGATIVE: 1
MYALGIAS: 1
EYES NEGATIVE: 1
GASTROINTESTINAL NEGATIVE: 1
NEUROLOGICAL NEGATIVE: 1
NEAR-SYNCOPE: 1
CONSTITUTIONAL NEGATIVE: 1
ENDOCRINE NEGATIVE: 1
PSYCHIATRIC NEGATIVE: 1
RESPIRATORY NEGATIVE: 1

## 2024-12-10 NOTE — PROGRESS NOTES
Referred by Dr. Heath for Pre-op Clearance (EGD/Colonoscopy with Dr. CAMRON Wetzel.)     History Of Present Illness:    Dear Nika Fink PA-C,     I had the pleasure of meeting Mrs. Atkinson today at [facility] for perioperative cardiac clearance. The patient is seen in collaboration with Dr. Edwards. Mrs. Atkinson is a very pleasant 39 year old female with a history of GERD, Raynaud's, POTS, ADHD, knee surgery and hypothyroidism, denies history of smoking. Denies chest pain, shortness of breath or heart palpitations. Feels her POTS is getting worse over the past few years. Able to walk up a flight of stairs without dyspnea. Last syncopal episode in the last six months     Review of Systems   Constitutional: Negative.   HENT: Negative.     Eyes: Negative.    Cardiovascular:  Positive for near-syncope.   Respiratory: Negative.     Endocrine: Negative.    Hematologic/Lymphatic: Negative.    Skin: Negative.    Musculoskeletal:  Positive for myalgias.   Gastrointestinal: Negative.    Neurological: Negative.    Psychiatric/Behavioral: Negative.        Past Medical History:  She has a past medical history of Acute vaginitis (02/01/2020), ADHD (attention deficit hyperactivity disorder), Allergic, Anesthesia of skin (10/03/2017), Carpal tunnel syndrome, left upper limb (12/20/2016), Concussion with loss of consciousness of unspecified duration, subsequent encounter, Diarrhea, unspecified (11/08/2018), Disease of thyroid gland, Dry eye syndrome of bilateral lacrimal glands, Encounter for surveillance of contraceptive pills (04/25/2018), GERD (gastroesophageal reflux disease), Hashimoto's disease, Headache, unspecified (07/05/2019), Hypo-osmolality and hyponatremia (04/05/2020), Immunization not carried out because of patient refusal (07/05/2019), Left lower quadrant abdominal tenderness (08/22/2017), Left lower quadrant pain (11/17/2016), Other conditions influencing health status  (08/22/2017), Other intervertebral disc displacement, lumbar region, Other malaise (09/10/2020), Other specified noninflammatory disorders of vagina, Other symptoms and signs involving the musculoskeletal system (10/04/2018), Other symptoms and signs involving the musculoskeletal system (09/28/2018), Other symptoms and signs involving the musculoskeletal system (10/02/2018), Pain in right knee (04/25/2018), Pain in unspecified foot (09/10/2020), Pelvic and perineal pain (06/05/2020), Periodic fever syndromes (Multi), Personal history of diseases of the blood and blood-forming organs and certain disorders involving the immune mechanism, Personal history of diseases of the skin and subcutaneous tissue (06/10/2016), Personal history of other benign neoplasm, Personal history of other diseases of the circulatory system (10/25/2016), Personal history of other diseases of the digestive system (07/05/2019), Personal history of other diseases of the digestive system (02/28/2018), Personal history of other diseases of the female genital tract (05/22/2018), Personal history of other diseases of the musculoskeletal system and connective tissue (09/02/2020), Personal history of other diseases of the musculoskeletal system and connective tissue (09/10/2020), Personal history of other diseases of the respiratory system (02/28/2018), Personal history of other diseases of the respiratory system, Personal history of other diseases of the respiratory system (12/21/2016), Personal history of other endocrine, nutritional and metabolic disease (04/06/2020), Personal history of other infectious and parasitic diseases, Personal history of other specified conditions (07/05/2019), Personal history of other specified conditions (03/27/2019), Personal history of other specified conditions (08/22/2017), Personal history of other specified conditions (08/12/2021), Personal history of other specified conditions (07/05/2019), Personal history of  other specified conditions (09/10/2020), Personal history of other specified conditions (12/30/2016), Personal history of traumatic brain injury (06/04/2018), 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), Rash and other nonspecific skin eruption (06/10/2016), Right upper quadrant pain (12/03/2019), Syncope, Syncope and collapse (02/28/2018), Tremor, unspecified (12/19/2016), Unspecified abdominal pain (12/12/2019), Unspecified asthma with (acute) exacerbation ([facility]) (02/28/2018), and Unspecified symptoms and signs involving the genitourinary system (12/05/2019).    Past Surgical History:  She has a past surgical history that includes Gallbladder surgery (06/10/2016); Knee surgery (Left, 06/10/2016); Hernia repair (06/10/2016); Tonsillectomy (10/24/2016); Adenoidectomy (10/24/2016); Other surgical history (10/24/2016); Lymph node biopsy; and Cholecystectomy.      Social History:  She reports that she has never smoked. She has never used smokeless tobacco. She reports current alcohol use. She reports that she does not use drugs.    Family History:  Family History   Problem Relation Name Age of Onset    Thyroid disease Mother Blessing     Asthma Mother Blessing     Lupus Mother Blessing     Cancer Mother Blessing     Depression Mother Blessing     Hyperlipidemia Mother Blessing     Hypertension Mother Blessing     Hypertension Father Linsey     Gout Father Linsey     Hyperlipidemia Father Linsey     No Known Problems Brother      Breast cancer Father's Sister          Allergies:  Ciprofloxacin, Codeine sulfate, Hydroxychloroquine, Oxycodone-acetaminophen, Tuberculin ppd, Codeine, Gum vxggps-uedwbs-vutt-alcohol, Sodium lauroylsarcosinate, Sodium lauryl sarcosinate, Adhesive tape-silicones, Blue dye, Dibucaine, Gold keratinate, Gold sodium thiosulfate, Lidocaine, Linalool, Nickel, Sodium laureth sulfate, Sodium thiosulfate, Thimerosal, Wound dressings, and Yellow dye    Outpatient Medications:  Current Outpatient Medications   Medication  Instructions    acyclovir (Zovirax) 5 % ointment 1 Application    busPIRone (BUSPAR) 5 mg, oral, 2 times daily    celecoxib (CELEBREX) 400 mg, 2 times daily PRN    certolizumab pegol (Cimzia) injection INJECT 2 SYRINGES (400MG) UNDER THE SKIN ONCE EVERY 4 WEEKS.    cholecalciferol (VITAMIN D-3) 2,000 Units, Daily    cyclobenzaprine (Flexeril) 10 mg tablet 1 tablet, Nightly    famotidine (PEPCID) 40 mg, oral, Every 12 hours    fexofenadine (Allegra Allergy) 180 mg tablet Take 2 tablets in the morning orally    ipratropium (Atrovent) 21 mcg (0.03 %) nasal spray 2 sprays, Each Nostril, Every 12 hours    levalbuterol (Xopenex HFA) 45 mcg/actuation inhaler 2 puffs, Every 6 hours    metoprolol succinate XL (TOPROL-XL) 25 mg, oral, Daily, Do not crush or chew..    mometasone (Nasonex) 50 mcg/actuation nasal spray Administer into affected nostril(s).    omeprazole (PRILOSEC) 20 mg, oral, 2 times daily, Do not crush or chew.    sulfaSALAzine (AZULFIDINE) 500 mg, oral, 2 times daily    thyroid (pork) (NP THYROID) 30 mg, oral, Daily    thyroid (pork) (NP THYROID) 15 mg, oral, Daily    valACYclovir (Valtrex) 500 mg tablet 1 tablet, 2 times daily PRN        Last Recorded Vitals:  Vitals:    12/10/24 0900   BP: 133/80   Pulse: 86   SpO2: 98%   Weight: 73.9 kg (163 lb)       Physical Exam:  Physical Exam  Vitals reviewed.   HENT:      Head: Normocephalic.      Nose: Nose normal.   Eyes:      Pupils: Pupils are equal, round, and reactive to light.   Cardiovascular:      Rate and Rhythm: Normal rate and regular rhythm.   Pulmonary:      Effort: Pulmonary effort is normal.      Breath sounds: Normal breath sounds.   Abdominal:      General: Abdomen is flat.      Palpations: Abdomen is soft.   Musculoskeletal:         General: Normal range of motion.      Cervical back: Normal range of motion.   Skin:     General: Skin is warm and dry.   Neurological:      General: No focal deficit present.      Mental Status: He is alert and oriented  to person, place, and time.   Psychiatric:         Mood and Affect: Mood normal.            Last Labs:  CBC -  Lab Results   Component Value Date    WBC 5.6 11/20/2024    HGB 13.5 11/20/2024    HCT 41.5 11/20/2024    MCV 94 11/20/2024     11/20/2024       CMP -  Lab Results   Component Value Date    CALCIUM 9.7 11/20/2024    PHOS 3.5 04/03/2020    PROT 6.8 11/20/2024    PROT 6.8 11/20/2024    ALBUMIN 4.3 11/20/2024    AST 15 11/20/2024    ALT 15 11/20/2024    ALKPHOS 70 11/20/2024    BILITOT 0.4 11/20/2024       LIPID PANEL -   Lab Results   Component Value Date    CHOL 154 05/11/2023    TRIG 56 05/11/2023    HDL 59 05/11/2023    CHHDL 2.6 05/11/2023    LDLF 87 04/07/2021    VLDL 14 04/07/2021       RENAL FUNCTION PANEL -   Lab Results   Component Value Date    GLUCOSE 86 11/20/2024     11/20/2024    K 4.1 11/20/2024     11/20/2024    CO2 25 11/20/2024    ANIONGAP 13 11/20/2024    BUN 13 11/20/2024    CREATININE 0.74 11/20/2024    CALCIUM 9.7 11/20/2024    PHOS 3.5 04/03/2020    ALBUMIN 4.3 11/20/2024        Lab Results   Component Value Date    HGBA1C 5.3 04/07/2021       Last Cardiology Tests:  ECG:  EKG independently reviewed from today showed sinus rhythm heart rate 85 bpm   Echo:  Echocardiogram 7/11/2022  The left ventricular chamber size is normal.  There is hyperdynamic left ventricular systolic function.  Estimated ejection fraction is 60-64%.  The left atrial size is normal.  No evidence of aortic regurgitation.  A trace of mitral regurgitation.  No evidence of tricuspid regurgitation.  Ejection Fractions:  LVEF 60-65%  Cath:    Stress Test:    Cardiac Imaging:      Assessment/Plan   Mrs./ Cleveland is a very pleasant 39 year old female with a history of POTS, ADHD, hypothroidism, and GERD, she is here for perioperative cardiac clearance for a colonoscopy. EKG shows a sinus rhythm. POTS has been controlled. She is able to walk up two flights of stairs without dyspnea. She is low cardiac  risk for surgery and may proceed to the OR without further cardiac testing. Heart rate and blood pressure are well controlled today     Plan   -no medication changes   -low cardiac risk surgery   -follow up as needed     I appreciate the opportunity to participate in the patient's care, please call with any questions     Priyanka Donis, APRN-CNP

## 2024-12-11 ENCOUNTER — OFFICE VISIT (OUTPATIENT)
Dept: RHEUMATOLOGY | Facility: CLINIC | Age: 39
End: 2024-12-11
Payer: COMMERCIAL

## 2024-12-11 VITALS
BODY MASS INDEX: 31.83 KG/M2 | HEART RATE: 87 BPM | OXYGEN SATURATION: 99 % | WEIGHT: 163 LBS | DIASTOLIC BLOOD PRESSURE: 78 MMHG | SYSTOLIC BLOOD PRESSURE: 112 MMHG

## 2024-12-11 DIAGNOSIS — E06.3 HASHIMOTO'S THYROIDITIS: ICD-10-CM

## 2024-12-11 DIAGNOSIS — R76.8 POSITIVE ANA (ANTINUCLEAR ANTIBODY): ICD-10-CM

## 2024-12-11 DIAGNOSIS — Z79.899 ENCOUNTER FOR LONG-TERM (CURRENT) USE OF MEDICATIONS: ICD-10-CM

## 2024-12-11 DIAGNOSIS — M06.09 POLYARTHRITIS WITH NEGATIVE RHEUMATOID FACTOR (MULTI): Primary | ICD-10-CM

## 2024-12-11 DIAGNOSIS — Q79.62 HYPERMOBILE EHLERS-DANLOS SYNDROME (HHS-HCC): ICD-10-CM

## 2024-12-11 DIAGNOSIS — E55.9 VITAMIN D DEFICIENCY: ICD-10-CM

## 2024-12-11 LAB
ATRIAL RATE: 85 BPM
P AXIS: 40 DEGREES
P OFFSET: 203 MS
P ONSET: 157 MS
PR INTERVAL: 132 MS
Q ONSET: 223 MS
QRS COUNT: 14 BEATS
QRS DURATION: 74 MS
QT INTERVAL: 350 MS
QTC CALCULATION(BAZETT): 416 MS
QTC FREDERICIA: 393 MS
R AXIS: 45 DEGREES
T AXIS: 18 DEGREES
T OFFSET: 398 MS
VENTRICULAR RATE: 85 BPM

## 2024-12-11 PROCEDURE — 99213 OFFICE O/P EST LOW 20 MIN: CPT | Performed by: INTERNAL MEDICINE

## 2024-12-11 PROCEDURE — 1036F TOBACCO NON-USER: CPT | Performed by: INTERNAL MEDICINE

## 2024-12-11 RX ORDER — CYCLOBENZAPRINE HCL 10 MG
10 TABLET ORAL NIGHTLY
Qty: 90 TABLET | Refills: 3 | Status: SHIPPED | OUTPATIENT
Start: 2024-12-11

## 2024-12-11 ASSESSMENT — ROUTINE ASSESSMENT OF PATIENT INDEX DATA (RAPID3)
ON A SCALE OF ONE TO TEN, CONSIDERING ALL THE WAYS IN WHICH ILLNESS AND HEALTH CONDITIONS MAY AFFECT YOU AT THIS TIME, PLEASE INDICATE BELOW HOW YOU ARE DOING:: 4
FN_SCORE: 0.3
TURN_FAUCETS_OFF: WITHOUT ANY DIFFICULTY
FEELINGS_ANXIETY_NERVOUS: WITH SOME DIFFICULTY
GOOD_NIGHTS_SLEEP: WITH SOME DIFFICULTY
PARTIPATE_RECREATIONAL_ACTIVITIES: WITHOUT ANY DIFFICULTY
IN_OUT_TRANSPORT: WITHOUT ANY DIFFICULTY
WALK_KILOMETERS: WITH SOME DIFFICULTY
PICK_CLOTHES_OFF_FLOOR: WITHOUT ANY DIFFICULTY
WASH_DRY_BODY: WITHOUT ANY DIFFICULTY
LIFT_CUP_TO_MOUTH: WITHOUT ANY DIFFICULTY
ON A SCALE OF ONE TO TEN, HOW MUCH PAIN HAVE YOU HAD BECAUSE OF YOUR CONDITION OVER THE PAST WEEK?: 4
WALK_FLAT_GROUND: WITHOUT ANY DIFFICULTY
ON A SCALE OF ONE TO TEN, CONSIDERING ALL THE WAYS IN WHICH ILLNESS AND HEALTH CONDITIONS MAY AFFECT YOU AT THIS TIME, PLEASE INDICATE BELOW HOW YOU ARE DOING:: 4
SEVERITY_SCORE: MODERATE SEVERITY (MS)
ON A SCALE OF ONE TO TEN, HOW MUCH PAIN HAVE YOU HAD BECAUSE OF YOUR CONDITION OVER THE PAST WEEK?: 4
FEELINGS_DEPRESSION: WITH SOME DIFFICULTY
SEVERITY_SCORE: 0
IN_OUT_BED: WITHOUT ANY DIFFICULTY
TOTAL RAPID3 SCORE: 8.3
DRESS_YOURSELF: WITHOUT ANY DIFFICULTY
WEIGHTED_TOTAL_SCORE: 2.77
SUM OF QUESTIONS A TO J: 1

## 2024-12-11 ASSESSMENT — ENCOUNTER SYMPTOMS
LOSS OF SENSATION IN FEET: 0
OCCASIONAL FEELINGS OF UNSTEADINESS: 0
DEPRESSION: 0

## 2024-12-11 NOTE — PROGRESS NOTES
[facility]/  Rheumatology  [address] [place] [address], [address]  [place] [id number]  Phone: [phone]  Fax: [phone]    Rheumatology Progress Note 12/11/2024      Elma Cleveland is a 39 y.o. female here for   Chief Complaint   Patient presents with    Follow-up     4 month fu with labs       Last Visit: 7/25/24    Rheum Hx  Referred by for: CRP ELEVATED/FOOT PAIN/ JOINT  PAIN/MALAISE AND FATIGUE/NUMBNESS  Patient has seen multiple rheumatologists including Dr. Crespo,  Dr. Caballero, and Dr. Mercado.  Per available records review Dr. Caballero diagnosed patient with  question of undifferentiated connective tissue disease. Patient has  a history of joint pains and high EVELIN at the age of 16 years old.  Found to have RNP slightly elevated. Negative HLA B27, RF, CCP,  Anca, CRP, uric acid, CBC, SPEP, LAC  Patient also saw Dr. Kumar who diagnosed her with chronic  pain, likely undifferentiated connective tissue disease with positive  EVELIN low RNP and Hashimoto's history  It looks a patient has been treated with non steroid antiinflammatory  drug and narcotics.  Patient apparently has also seen Dermatology but  unfortunately could not tolerate the Xyzal. She had a full body rash  with Plaquenil.  Chief complaint: Joint pain, muscle tightness  Since 16 years old  Slow onset  Intermittent course  Precipitating factors: None  Associated symptoms: Pain in joints, night sweats, low-grade  fevers, tiredness  Better: Aleve, sleep  Worse: Stress  Previous treatments naproxen-somewhat helpful  Ibuprofen-somewhat helpful  Meloxicam-no help  Diclofenac-no help  Celecoxib-no help  Acetaminophen-no help  Steroids-very helpful  Plaquenil-discontinued due to rash  Occupation: RN    Previous Rheum note reviewed. Dx with UCTD as a teenager. Low + EVELIN and RNP. Diagnosed by Dr Caballero. Mother Dx sith SLE. Then saw Dr Kumar and Dr Layne.  EVELIN 1:80 2016  Chronic urticaria/Multiple allergies/Asthma  LAD, s/p bx-  benign  PCOS  Benign joint hypermobility.   Hypothyroidism  Since 16 y/o after mono, everything snowballed from there.  Patient has had multiple to and including joint aches, infection  once a year bronchitis that will not go way. States nothing has been  done. 3 days full (not usually) body rash treated with steroids for  over a month.  Usual have rash is high pinpoint nonpruritic. Patient saw  allergist and told she is allergic to life. Patient takes Zyrtec for day  to help with the itchiness and rashes.  Does not have PCOS. Patient cannot take birth control has  makes her sick. Off Aldactone notes horrible acne and cystic acne.  Currently tenderness of bilateral elbows- left elbow is always  the worst, upper spine and lower spine, left knee and cas 3rd 4th  PIPs  No noted swelling  30 minutes to 1 hour of morning stiffness feels muscle  tightness that never seems to go away  Has had deep tissue massage 2-3/wk told very tight knots  She has never tried muscle relaxant or acupuncture.  History of right tennis elbow  Severe back pain more recently tightness lower back and hips  sometimes arms. Notes response in past to Synthroid.  Review of systems positive for:  Fatigue  LN >1 yr- biopsy normal per pt- ?reactive lymphoid  hyperplasia  Dry eyes, constant dry nose  Canker sores not as freq  R ear pulsatile tinnitus/MRA MRI unremarkable; ? scar tissue  +HSV  History of lung disease- Asthma cough variant, not well  controlled  Vocal cord dysfunction due to reflux- Pepcid has almost fixed  it; pantoprazole not working as well; to see GI  Alb incrased heart rate; on Xopenex and alb neb prn  Echo- slight pericardial thickening, ?elevated RVSP; hx of  vasovagal syncope from stomach pain  Gastroesophageal reflux disease  Abdominal pain/ prominent Peyer's patches/ no  gallbladder/dumping syndrome/ Bentyl or Levsin sometimes help  History of kidney stone  Rashes- hormonla; increased DHEAS; no adrenal hyperplasia;  cysts that  come and go; cortisol test done during her night shift  work  Hx of 3 L knee surgeries  Joint pain  Morning stiffness  Back pain that wakes her up from sleep approved upon getting  up and with activity and with rest  Myalgias  Hx of concussion  Weakness right foot- Saw Neuro and did PT up and down; MRI  spine showed herniated disc; EMP upper arm skin couldn't even  feel it.; nystagmus and tiredness  Frequent infections including bacterial vaginosis, trichomonas  History of thyroid disease- +TPO, very symptomatci in 2014  and at TSH level 5; will be following with Endo  Allergies  Depression  Component      Latest Ref Rng 5/22/2018 12/3/2019   Tissue Transglutaminase, IgA      0 - 14  <1     Tissue Transglutamase IgG      0 - 14  <1     DEAMIDATED GLIADIN PEPTIDE IGA      0 - 14  1     DEAMIDATED GLIADIN PEPTIDE IGG      0 - 14  <1     Mononucleosis Screen      NEGATIVE   NEGATIVE      Component      Latest Ref Rng 9/14/2020 12/8/2020   EVELIN      NEGATIVE  POSITIVE !     EVELIN Titer 1:80     EVELIN Pattern HOMOGENEOUS     Anti-SM      AI <0.2     Anti-RNP      AI 2.0 !     Anti-SM/RNP      AI <0.2     Anti-SSA      AI <0.2     Anti-SSB      AI <0.2     Anti-SCL-70      AI <0.2     Anti-RANDY-1 IgG      AI <0.2     Anti-Chromatin      AI <0.2     Anti-Centromere      AI <0.2     ANTI-RIBOSOMAL P      AI <0.2     Anti-DNA (DS)      IU/mL 1.0     C-ANCA (PR3) BY EIA  (Note)…    C-ANCA FLOURESCENCE  Negative…    P-ANCA (MPO) BY EIA  (Note)…    P-ANCA FLOURESCENCE  Negative…    ANCP INTERPRETATION  (Note)…    Staff Review  (Note)…    IgG Cardiolipin Ab  <9.0…    IgM Cardiolipin Ab  20.8… (H)    IgA Cardiolipin Ab  11.2…    Beta-2 Glyco 1 IgG  <9…    Beta 2 Glyco 1 IgM  <9…    Angiotensin 1 Conv  26…    Beta-2 Glyco 1 IgA  1…    Centromere Ab  Negative…      Component      Latest Ref Rng 3/17/2021 8/4/2022   IgG Cardiolipin Ab <9.0…     IgM Cardiolipin Ab 10.1…     IgA Cardiolipin Ab 12.4… (H)     PTT-LA  37.0…    DRVVT  37.1…     Lupus Anticoagulant Interpretation  No LAC   SSA ANTIBODIES <0.2…     SSB ANTIBODIES <0.2…          Previous Tx  naproxen-somewhat helpful  Ibuprofen-somewhat helpful  Meloxicam-no help  Diclofenac-no help  Celecoxib-no help  Acetaminophen-no help  Steroids-very helpful  Plaquenil-discontinued due to rash    Health Maintenance  DXA- none  Malignancy Hx- none  Component      Latest Ref Rng 4/25/2019 12/3/2019 12/8/2020   Herpes Simplex I IgG <0.2…      HSV 1, IgG Negative…      Herpes Simplex 2 IgG <0.2…      HSV 2, IgG Negative…      HERPES SIMPLEX IGM 0.79…      HSV IgM Qualitative Negative…      Mononucleosis Screen      NEGATIVE   NEGATIVE     Hepatitis B Surface AG      NEG    NEGATIVE    Hepatitis C AB   Negative…    Lyme Antibodies Screen   Negative…      Immunization History   Administered Date(s) Administered    AS03 Adjuvant 09/23/2021    Flu vaccine (IIV4), preservative free *Check age/dose* 09/15/2019, 10/01/2020    Flu vaccine, quadrivalent, high-dose, preservative free, age 65y+ (FLUZONE) 09/23/2021    Flu vaccine, quadrivalent, no egg protein, age 6 month or greater (FLUCELVAX) 11/01/2023    Influenza, Unspecified 10/19/2011, 09/26/2012, 10/04/2013, 11/04/2014, 09/26/2017, 10/20/2022, 10/12/2023    Influenza, seasonal, injectable 10/22/2013, 10/30/2019    Influenza, trivalent, adjuvanted 10/14/2022    MMR vaccine, subcutaneous (MMR II) 09/01/1998    Moderna COVID-19 vaccine, 12 years and older (50mcg/0.5mL)(Spikevax) 11/13/2023    Moderna COVID-19 vaccine, bivalent, blue cap/gray label *Check age/dose* 11/12/2022    Moderna SARS-CoV-2 Vaccination 05/28/2021, 06/25/2021, 02/04/2022    PPD Test 12/25/2010, 04/04/2011    Tdap vaccine, age 7 year and older (BOOSTRIX, ADACEL) 09/16/2019    Zoster vaccine, recombinant, adult (SHINGRIX) 08/15/2022          Past Medical History:   Diagnosis Date    Acute vaginitis 02/01/2020    Vaginitis and vulvovaginitis    ADHD (attention deficit hyperactivity disorder)      Allergic     Anesthesia of skin 10/03/2017    Right arm numbness    Carpal tunnel syndrome, left upper limb 12/20/2016    Left carpal tunnel syndrome    Concussion with loss of consciousness of unspecified duration, subsequent encounter     Concussion with loss of consciousness of unspecified duration, subsequent encounter    Diarrhea, unspecified 11/08/2018    Acute diarrhea    Disease of thyroid gland     Dry eye syndrome of bilateral lacrimal glands     Insufficiency of tear film of both eyes    Encounter for surveillance of contraceptive pills 04/25/2018    Encounter for birth control pills maintenance    GERD (gastroesophageal reflux disease)     Hashimoto's disease     Headache, unspecified 07/05/2019    Mixed headache    Hypo-osmolality and hyponatremia 04/05/2020    Hyponatremia    Immunization not carried out because of patient refusal 07/05/2019    Influenza vaccination declined    Left lower quadrant abdominal tenderness 08/22/2017    Abdominal left lower quadrant tenderness    Left lower quadrant pain 11/17/2016    Colicky LLQ abdominal pain    Other conditions influencing health status 08/22/2017    History of chronic diarrhea    Other intervertebral disc displacement, lumbar region     Herniated lumbar disc without myelopathy    Other malaise 09/10/2020    Malaise and fatigue    Other specified noninflammatory disorders of vagina     Vaginal lesion    Other symptoms and signs involving the musculoskeletal system 10/04/2018    Weakness of right foot    Other symptoms and signs involving the musculoskeletal system 09/28/2018    Weakness of right foot    Other symptoms and signs involving the musculoskeletal system 10/02/2018    Weakness of right lower extremity    Pain in right knee 04/25/2018    Right knee pain    Pain in unspecified foot 09/10/2020    Foot pain    Pelvic and perineal pain 06/05/2020    Pelvic pain    Periodic fever syndromes (Multi)     Autosomal dominant familial Mediterranean  fever    Personal history of diseases of the blood and blood-forming organs and certain disorders involving the immune mechanism     History of autoimmune disorder    Personal history of diseases of the skin and subcutaneous tissue 06/10/2016    History of allergic urticaria    Personal history of other benign neoplasm     History of adenoma of adrenal gland    Personal history of other diseases of the circulatory system 10/25/2016    History of irregular heartbeat    Personal history of other diseases of the digestive system 07/05/2019    History of left inguinal hernia    Personal history of other diseases of the digestive system 02/28/2018    History of hemorrhoids    Personal history of other diseases of the female genital tract 05/22/2018    History of menorrhagia    Personal history of other diseases of the musculoskeletal system and connective tissue 09/02/2020    History of joint pain    Personal history of other diseases of the musculoskeletal system and connective tissue 09/10/2020    History of joint pain    Personal history of other diseases of the respiratory system 02/28/2018    History of acute sinusitis    Personal history of other diseases of the respiratory system     History of asthma    Personal history of other diseases of the respiratory system 12/21/2016    History of acute sinusitis    Personal history of other endocrine, nutritional and metabolic disease 04/06/2020    History of hyperkalemia    Personal history of other infectious and parasitic diseases     History of mononucleosis    Personal history of other specified conditions 07/05/2019    History of nausea    Personal history of other specified conditions 03/27/2019    History of palpitations    Personal history of other specified conditions 08/22/2017    History of urinary frequency    Personal history of other specified conditions 08/12/2021    History of dizziness    Personal history of other specified conditions 07/05/2019     History of headache    Personal history of other specified conditions 09/10/2020    History of numbness    Personal history of other specified conditions 12/30/2016    History of weight gain    Personal history of traumatic brain injury 06/04/2018    History of concussion    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) 02/28/2018    Acute asthma exacerbation    Unspecified symptoms and signs involving the genitourinary system 12/05/2019    UTI symptoms      Past Surgical History:   Procedure Laterality Date    ADENOIDECTOMY  10/24/2016    Adenoidectomy    CHOLECYSTECTOMY      GALLBLADDER SURGERY  06/10/2016    Gallbladder Surgery    HERNIA REPAIR  06/10/2016    Hernia Repair    KNEE SURGERY Left 06/10/2016    2001, 2007, 2008    LYMPH NODE BIOPSY      OTHER SURGICAL HISTORY  10/24/2016    Deep Cervical Lymph Node Biopsy    TONSILLECTOMY  10/24/2016    Tonsillectomy      Current Outpatient Medications   Medication Sig Dispense Refill    acyclovir (Zovirax) 5 % ointment 1 Application.      busPIRone (Buspar) 5 mg tablet Take 1 tablet (5 mg) by mouth 2 times a day. 60 tablet 11    celecoxib (CeleBREX) 400 mg capsule Take 1 capsule (400 mg) by mouth 2 times a day as needed.      certolizumab pegol (Cimzia) injection INJECT 2 SYRINGES (400MG) UNDER THE SKIN ONCE EVERY 4 WEEKS. 2 mL 11    cholecalciferol (Vitamin D-3) 50 MCG (2000 UT) tablet Take 1 tablet (2,000 Units) by mouth once daily.      cyclobenzaprine (Flexeril) 10 mg tablet Take 1 tablet (10 mg) by mouth once daily at bedtime. PRN 90  tablet 3    famotidine (Pepcid) 40 mg tablet Take 1 tablet (40 mg) by mouth every 12 hours. 60 tablet 1    fexofenadine (Allegra Allergy) 180 mg tablet Take 2 tablets in the morning orally 60 tablet 1    ipratropium (Atrovent) 21 mcg (0.03 %) nasal spray Administer 2 sprays into each nostril every 12 hours. 30 mL 1    levalbuterol (Xopenex HFA) 45 mcg/actuation inhaler Inhale 2 puffs every 6 hours.      metoprolol succinate XL (Toprol-XL) 25 mg 24 hr tablet Take 1 tablet (25 mg) by mouth early in the morning.. Do not crush or chew..      mometasone (Nasonex) 50 mcg/actuation nasal spray Administer into affected nostril(s).      omeprazole (PriLOSEC) 20 mg DR capsule Take 1 capsule (20 mg) by mouth 2 times a day for 14 days. Do not crush or chew. 28 capsule 0    sulfaSALAzine (Azulfidine) 500 mg tablet TAKE 1 TABLET BY MOUTH TWICE A DAY FOR 30 DAYS 180 tablet 3    thyroid, pork, (NP Thyroid) 15 mg tablet Take 1 tablet (15 mg) by mouth once daily. 90 tablet 3    thyroid, pork, (NP Thyroid) 30 mg tablet Take 1 tablet (30 mg) by mouth once daily. 90 tablet 3    valACYclovir (Valtrex) 500 mg tablet Take 1 tablet (500 mg) by mouth 2 times a day as needed (OUTBREAK).       No current facility-administered medications for this visit.      Allergies   Allergen Reactions    Ciprofloxacin Other     Reaction: Vomiting    Codeine Sulfate Other     NIGHTMARES AND SLEEPWALKING    Hydroxychloroquine Rash     Full body rash    Oxycodone-Acetaminophen Itching     full body itching    Tuberculin Ppd Unknown and Rash     ALLERGY TO BOTH APLISOL AND TUBERSOL CAUSING REDNESS, RASH AND IRRITATION UNDER SKIN.  ANNUAL SCREENING IS A TB GOLD.    Codeine Hives    Gum Gseyjg-Jykejc-Ehhi-Alcohol Unknown    Sodium Lauroylsarcosinate Unknown    Sodium Lauryl Sarcosinate Unknown    Adhesive Tape-Silicones Rash    Blue Dye Hives and Rash    Dibucaine Hives and Rash     Other Reaction(s): Unknown    Gold Keratinate Hives and Rash    Gold Sodium  Thiosulfate Rash    Lidocaine Hives and Rash    Linalool Hives and Rash    Nickel Hives and Rash    Sodium Laureth Sulfate Rash, Unknown and Hives    Sodium Thiosulfate Rash    Thimerosal Hives and Rash    Wound Dressings Hives and Rash     Other Reaction(s): hives    Yellow Dye Hives and Rash      Visit Vitals  /78   Pulse 87   Wt 73.9 kg (163 lb)   SpO2 99%   BMI 31.83 kg/m²   OB Status Unknown   Smoking Status Never   BSA 1.77 m²              Rapid 3  Function Score (FN): 0.3  Pain Score (PN) (0-10): 4  Patient Global (PTGL) (0-10): 4  Rapid3 Score: 8.3      Workup  Component      Latest Ref Rng 11/20/2024   WBC      4.4 - 11.3 x10*3/uL 5.6    nRBC      0.0 - 0.0 /100 WBCs 0.0    RBC      4.00 - 5.20 x10*6/uL 4.41    HEMOGLOBIN      12.0 - 16.0 g/dL 13.5    HEMATOCRIT      36.0 - 46.0 % 41.5    MCV      80 - 100 fL 94    MCH      26.0 - 34.0 pg 30.6    MCHC      32.0 - 36.0 g/dL 32.5    RED CELL DISTRIBUTION WIDTH      11.5 - 14.5 % 11.9    Platelets      150 - 450 x10*3/uL 281    Neutrophils %      40.0 - 80.0 % 48.4    Immature Granulocytes %, Automated      0.0 - 0.9 % 0.2    Lymphocytes %      13.0 - 44.0 % 40.8    Monocytes %      2.0 - 10.0 % 8.1    Eosinophils %      0.0 - 6.0 % 1.4    Basophils %      0.0 - 2.0 % 1.1    Neutrophils Absolute      1.20 - 7.70 x10*3/uL 2.70    Immature Granulocytes Absolute, Automated      0.00 - 0.70 x10*3/uL 0.01    Lymphocytes Absolute      1.20 - 4.80 x10*3/uL 2.27    Monocytes Absolute      0.10 - 1.00 x10*3/uL 0.45    Eosinophils Absolute      0.00 - 0.70 x10*3/uL 0.08    Basophils Absolute      0.00 - 0.10 x10*3/uL 0.06    GLUCOSE      74 - 99 mg/dL 86    SODIUM      136 - 145 mmol/L 138    POTASSIUM      3.5 - 5.3 mmol/L 4.1    CHLORIDE      98 - 107 mmol/L 104    Bicarbonate      21 - 32 mmol/L 25    Anion Gap      10 - 20 mmol/L 13    Blood Urea Nitrogen      6 - 23 mg/dL 13    Creatinine      0.50 - 1.05 mg/dL 0.74    EGFR      >60 mL/min/1.73m*2 >90     Calcium      8.6 - 10.6 mg/dL 9.7    Albumin      3.4 - 5.0 g/dL 4.3    Alkaline Phosphatase      33 - 110 U/L 70    Total Protein      6.4 - 8.2 g/dL 6.8    Total Protein      6.4 - 8.2 g/dL 6.8    AST      9 - 39 U/L 15    Bilirubin Total      0.0 - 1.2 mg/dL 0.4    ALT      7 - 45 U/L 15    Color, Urine      Light-Yellow, Yellow, Dark-Yellow  Light-Yellow    Appearance, Urine      Clear  Clear    Specific Gravity, Urine      1.005 - 1.035  1.020    pH, Urine      5.0, 5.5, 6.0, 6.5, 7.0, 7.5, 8.0  5.5    Protein, Urine      NEGATIVE, 10 (TRACE), 20 (TRACE) mg/dL NEGATIVE    Glucose, Urine      Normal mg/dL Normal    Blood, Urine      NEGATIVE  NEGATIVE    Ketones, Urine      NEGATIVE mg/dL NEGATIVE    Bilirubin, Urine      NEGATIVE  NEGATIVE    Urobilinogen, Urine      Normal mg/dL Normal    Nitrite, Urine      NEGATIVE  NEGATIVE    Leukocyte Esterase, Urine      NEGATIVE  75 Prasanna/µL !    Albumin %      % 26.5    Alpha 1 Globulin %      % 4.2    Alpha 2 Globulin %      % 26.9    Beta Globulin %      % 26.1    Gamma Globulin %      % 16.3    Urine Electrophoresis Comment Normal.    Path Review-Urine Protein Electrophoresis  Reviewed and approved by ISAÍAS ZARCO on 11/22/24 at 8:16 PM.    Path Review - Urine Immunofixation Reviewed and approved by ISAÍAS ZARCO on 11/22/24 at 8:16 PM.    Immunofixation Comment No monoclonal protein detected by immunofixation.    Immunofixation Comment No monoclonal protein detected by immunofixation.    Albumin      3.4 - 5.0 g/dL 4.3    Alpha 1 Globulin      0.2 - 0.6 g/dL 0.3    Alpha 2 Globulin      0.4 - 1.1 g/dL 0.6    Beta Globulin      0.5 - 1.2 g/dL 0.7    Gamma      0.5 - 1.4 g/dL 1.0    Protein Electrophoresis Comment Normal.    Path Review - Serum Protein Electrophoresis  Reviewed and approved by ISAÍAS ZARCO on 11/22/24 at 9:39 PM.    Path Review - Serum Immunofixation Reviewed and approved by ISAÍAS ZARCO on 11/22/24 at 9:39 PM.    DRVVT Screen      RATIO 0.77     DRVVT Confirmation      RATIO 0.89    DRVVT Test Ratio      <=1.20 RATIO 0.86    SCT Screen      RATIO 0.98    SCT Confirmation      RATIO 0.99    SCT Test Ratio      <=1.16 RATIO 0.99    Lupus Anticoagulant Interpretation No evidence of lupus anticoagulant    WBC, Urine      1-5, NONE /HPF 1-5    RBC, Urine      NONE, 1-2, 3-5 /HPF NONE    Squamous Epithelial Cells, Urine      Reference range not established. /HPF 1-9 (SPARSE)    Mucus, Urine      Reference range not established. /LPF FEW    Albumin, Urine Random      Not established mg/L <7.0    Creatinine, Urine Random      20.0 - 320.0 mg/dL 132.0    Albumin/Creatinine Ratio --    Anticardiolipin IgA      <20.0 APL U/mL 1.1    Anticardiolipin IgG      <20.0 GPL U/mL <1.6    Anticardiolipin IgM      <20.0 MPL U/mL 5.1    Scan Result 14.3.3 neg    Scan Result HCQ <25   Scan Result Vectra 31 (moderate)   Anti-DNA (DS)      <5.0 IU/mL 14.0 (H)    C1Q Complement      10.3 - 20.5 mg/dL 10.9    C3 Complement      87 - 200 mg/dL 133    C4 Complement      10 - 50 mg/dL 24    C-Reactive Protein      <1.00 mg/dL 0.40    Creatine Kinase      0 - 215 U/L 33    Sed Rate      0 - 20 mm/h 12    Vitamin D, 25-Hydroxy, Total      30 - 100 ng/mL 22 (L)    Angio Converting Enzyme      16 - 85 U/L 32    Vit D, 1,25-Dihydroxy      19.9 - 79.3 pg/mL 50.4    Extra Tube Hold for add-ons.    Total Protein, Urine      5 - 25 mg/dL 4 (L)            Assessment/Plan  1. 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)    2. Positive EVELIN (antinuclear antibody)    3. Hypermobile Doug-Danlos syndrome (HHS-HCC)    4. Hashimoto's thyroiditis    5. Vitamin D deficiency    6. Encounter for long-term (current) use of medications             Orders Placed This Encounter   Procedures    CBC and Auto Differential    Comprehensive Metabolic Panel    C-Reactive Protein    Creatine Kinase    Sedimentation Rate    Urinalysis with Reflex Culture and Microscopic    Albumin-Creatinine Ratio, Urine  Random    Anti-DNA Antibody, Double-Stranded    C1Q Complement    C3 Complement    C4 Complement    Creatinine, Urine Random          Since last appt, adherent and tolerating Cimzia.  Celebrex prn- helpful  SSZ on hold. The flushing was likely related to the allergies and has gotten better with tx.  Trying to do antinfl diet. Helped her feet a lot.   Daily vit D with K OTC- helping  Hands, wrists and elbows still ache.  Recent anaphylaxis to coconut.  2 Allegra, 4 Pepcid and 2 Zyrtec for MAS. For possible MAS biologic.   Neck LN that was enlarged, now small  Hx of biopsy of LN- R   Denies any recent or current infection.  Not on any NSAIDs or glucocorticoids.  ROS+ for fatigue, sicca with red/painful eyes without vision loss, sore throat, swollen glands/nodes, cough, GERD/GI issues, rashes, joint pain/stiffness, back pain, raynaud's without pitting/ulceration, depression/anxiety.  Rapid 3 consistent with   Labs reviewed  D/w pt tx options and decided on   Continue Cimzia  Resume SSZ  Retry antiinfl diet  If doing well, can wean off SSZ.  Vit D with K  Advised of possible side effects and importance of monitoring.   All questions answered.  Patient to follow up with primary care provider regarding all other medical issues not addressed today and for medical chart updating.     Genesis Bauer MD      Patient Care Team:  Nika Fink PA-C as PCP - General (Family Medicine)  Margaret Gould DO as PCP - Employee [employer] PCP  Marisela Aguilar DO as Primary Care Provider  Genesis Bauer MD as Consulting Physician (Rheumatology)

## 2024-12-23 ENCOUNTER — SPECIALTY PHARMACY (OUTPATIENT)
Dept: PHARMACY | Facility: CLINIC | Age: 39
End: 2024-12-23

## 2024-12-23 PROCEDURE — RXMED WILLOW AMBULATORY MEDICATION CHARGE

## 2024-12-24 ENCOUNTER — PHARMACY VISIT (OUTPATIENT)
Dept: PHARMACY | Facility: CLINIC | Age: 39
End: 2024-12-24
Payer: COMMERCIAL

## 2025-01-13 ENCOUNTER — PATIENT MESSAGE (OUTPATIENT)
Dept: PRIMARY CARE | Facility: CLINIC | Age: 40
End: 2025-01-13
Payer: COMMERCIAL

## 2025-01-13 DIAGNOSIS — A60.00 GENITAL HERPES SIMPLEX TYPE 2: Primary | ICD-10-CM

## 2025-01-13 RX ORDER — VALACYCLOVIR HYDROCHLORIDE 500 MG/1
500 TABLET, FILM COATED ORAL 2 TIMES DAILY
Qty: 6 TABLET | Refills: 5 | Status: SHIPPED | OUTPATIENT
Start: 2025-01-13 | End: 2025-01-16

## 2025-01-22 ENCOUNTER — SPECIALTY PHARMACY (OUTPATIENT)
Dept: PHARMACY | Facility: CLINIC | Age: 40
End: 2025-01-22

## 2025-01-22 PROCEDURE — RXMED WILLOW AMBULATORY MEDICATION CHARGE

## 2025-01-23 ENCOUNTER — PHARMACY VISIT (OUTPATIENT)
Dept: PHARMACY | Facility: CLINIC | Age: 40
End: 2025-01-23
Payer: COMMERCIAL

## 2025-02-10 ENCOUNTER — APPOINTMENT (OUTPATIENT)
Dept: OBSTETRICS AND GYNECOLOGY | Facility: CLINIC | Age: 40
End: 2025-02-10
Payer: COMMERCIAL

## 2025-02-10 VITALS
SYSTOLIC BLOOD PRESSURE: 118 MMHG | BODY MASS INDEX: 33.38 KG/M2 | DIASTOLIC BLOOD PRESSURE: 68 MMHG | WEIGHT: 170 LBS | HEIGHT: 60 IN

## 2025-02-10 DIAGNOSIS — Z01.419 WELL WOMAN EXAM WITH ROUTINE GYNECOLOGICAL EXAM: Primary | ICD-10-CM

## 2025-02-10 DIAGNOSIS — N94.6 DYSMENORRHEA: ICD-10-CM

## 2025-02-10 DIAGNOSIS — N89.8 VAGINAL DISCHARGE: ICD-10-CM

## 2025-02-10 PROBLEM — I87.2 CHRONIC VENOUS INSUFFICIENCY: Status: RESOLVED | Noted: 2023-04-19 | Resolved: 2025-02-10

## 2025-02-10 PROBLEM — E28.2 POLYCYSTIC OVARIES: Status: RESOLVED | Noted: 2024-03-12 | Resolved: 2025-02-10

## 2025-02-10 PROBLEM — M41.9 SCOLIOSIS, UNSPECIFIED: Status: RESOLVED | Noted: 2023-08-31 | Resolved: 2025-02-10

## 2025-02-10 PROBLEM — R73.03 PREDIABETES: Status: RESOLVED | Noted: 2024-11-22 | Resolved: 2025-02-10

## 2025-02-10 PROBLEM — G56.01 RIGHT CARPAL TUNNEL SYNDROME: Status: RESOLVED | Noted: 2023-04-19 | Resolved: 2025-02-10

## 2025-02-10 PROCEDURE — 99395 PREV VISIT EST AGE 18-39: CPT | Performed by: NURSE PRACTITIONER

## 2025-02-10 PROCEDURE — 3008F BODY MASS INDEX DOCD: CPT | Performed by: NURSE PRACTITIONER

## 2025-02-10 PROCEDURE — 1036F TOBACCO NON-USER: CPT | Performed by: NURSE PRACTITIONER

## 2025-02-10 ASSESSMENT — ENCOUNTER SYMPTOMS
GASTROINTESTINAL NEGATIVE: 1
UNEXPECTED WEIGHT CHANGE: 1
EYES NEGATIVE: 1
PSYCHIATRIC NEGATIVE: 1
MUSCULOSKELETAL NEGATIVE: 1
ALLERGIC/IMMUNOLOGIC NEGATIVE: 1
CARDIOVASCULAR NEGATIVE: 1
FATIGUE: 1
NEUROLOGICAL NEGATIVE: 1
HEMATOLOGIC/LYMPHATIC NEGATIVE: 1
RESPIRATORY NEGATIVE: 1
ENDOCRINE NEGATIVE: 1

## 2025-02-10 NOTE — PROGRESS NOTES
Chief Complaint    Annual Exam        HPI    ANNUAL    PAP 3/13/23 NEG HPV NEG  MAMM NEVER  GARDISIL NEVER    PT HAS BEEN HAVING BAD CRAMPS OVER THE 3 TO 6 MONTHS, ONLY WHEN SHE IS ON HER PERIOD.   Last edited by Elma Mcclellan MA on 2/10/2025  9:07 AM.         39 y.o. G0 female presents for annual well woman exam.   She was last seen in 03/2023.  Patient's menstrual cycles are regular every 28 days, lasting 4 days. Denies abnormal bleeding. Reports worsening dysmenorrhea over the past few months. Cramping real bad the first day. Tylenol, celebrex, Ibuprofen, heating pad. Last period was severe pain.   She is sexually active with long-term partner(s). Denies dyspareunia.   Condoms: None. Declines contraception at this time. States she has been on the pill, vaginal ring, and patch in the past but had issues with side effects. H/O HSV-2, treats as needed for outbreaks.   She denies any breast concerns.   Pap hx. normal. Last pap: 03/2023 negative and negative HPV.   Denies pelvic pain.  Reports vaginal discharge and itching. H/O yeast and BV in the past.   Denies urinary or bowel concerns. Colonoscopy: 12/2024 which was normal.   She is non-smoker  PMH: ADD, anxiety, asthma, Hashimoto's thyroiditis, Doug-Danlos syndrome, IBS, peptic ulcer disease, polyarthritis, mixed connective tissue disease.   Family h/o breast cancer: MGM, PGM, paternal aunt  Family h/o GYN cancer: Ovarian - Mother, negative genetic testing. Patient has had negative 23 and Me testing.   Family h/o colon cancer: None  Family h/o uterine fibroids, polyps: Mother.   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     /68   Ht 1.524 m (5')   Wt 77.1 kg (170 lb)   LMP 01/27/2025 (Exact Date)   BMI 33.20 kg/m²      Current Outpatient Medications   Medication Instructions    acyclovir (Zovirax) 5 % ointment 1 Application    busPIRone (BUSPAR) 5 mg, oral, 2 times daily    celecoxib (CELEBREX) 400 mg, 2 times daily PRN    certolizumab  pegol (Cimzia) injection INJECT 2 SYRINGES (400MG) UNDER THE SKIN ONCE EVERY 4 WEEKS.    cholecalciferol (VITAMIN D-3) 2,000 Units, Daily    cyclobenzaprine (FLEXERIL) 10 mg, oral, Nightly, PRN    famotidine (PEPCID) 40 mg, oral, Every 12 hours    fexofenadine (Allegra Allergy) 180 mg tablet Take 2 tablets in the morning orally    ipratropium (Atrovent) 21 mcg (0.03 %) nasal spray 2 sprays, Each Nostril, Every 12 hours    levalbuterol (Xopenex HFA) 45 mcg/actuation inhaler 2 puffs, Every 6 hours    metoprolol succinate XL (TOPROL-XL) 25 mg, Daily    mometasone (Nasonex) 50 mcg/actuation nasal spray Administer into affected nostril(s).    omeprazole (PRILOSEC) 20 mg, oral, 2 times daily, Do not crush or chew.    sulfaSALAzine (AZULFIDINE) 500 mg, oral, 2 times daily    thyroid (pork) (NP THYROID) 30 mg, oral, Daily    thyroid (pork) (NP THYROID) 15 mg, oral, Daily        Review of Systems   Constitutional:  Positive for fatigue and unexpected weight change.   HENT: Negative.     Eyes: Negative.    Respiratory: Negative.     Cardiovascular: Negative.    Gastrointestinal: Negative.    Endocrine: Negative.    Genitourinary:  Positive for menstrual problem and vaginal discharge.   Musculoskeletal: Negative.    Skin: Negative.    Allergic/Immunologic: Negative.    Neurological: Negative.    Hematological: Negative.    Psychiatric/Behavioral: Negative.     All other systems reviewed and are negative.       Physical Exam  Constitutional:       Appearance: Normal appearance.   HENT:      Head: Normocephalic.      Nose: Nose normal.   Cardiovascular:      Rate and Rhythm: Normal rate and regular rhythm.   Pulmonary:      Effort: Pulmonary effort is normal.      Breath sounds: Normal breath sounds.   Chest:   Breasts:     Right: Normal.      Left: Normal.   Abdominal:      General: Abdomen is flat.      Palpations: Abdomen is soft.   Genitourinary:     General: Normal vulva.      Vagina: Vaginal discharge (White) present.       Cervix: Normal.      Uterus: Normal.       Adnexa: Right adnexa normal and left adnexa normal.      Rectum: Normal.      Comments: Pap not collected  Discomfort on bimanual exam  Musculoskeletal:         General: Normal range of motion.      Cervical back: Normal range of motion and neck supple.   Skin:     General: Skin is warm and dry.   Neurological:      Mental Status: She is alert.   Psychiatric:         Mood and Affect: Mood normal.         Behavior: Behavior normal.          Assessment/Plan:   1. Well woman exam with routine gynecological exam (Primary)  -Pap test not collected today. Last pap 03/2023 negative and negative HPV. Guidelines discussed.   -Self breast awareness exams reviewed.  -Discussed family hx and option for genetic counseling in future if she desires.   -Advised yearly well woman exams.   -Follow up sooner if needed.     2. Dysmenorrhea  -Heavy, painful periods are commonly caused by hormones called prostaglandins which cause the uterus to tighten or cramp. There are some medical conditions that can cause painful periods as well.  -Management options include either non-hormonal NSAIDs such as Ibuprofen or hormonal medication including contraceptive pill, patch, vaginal ring, Depo-Provera injection, progesterone IUDs, or Nexplanon arm implant.  -Patient desires hormonal management.    -Pelvic US ordered for further evaluation:  - US PELVIS TRANSABDOMINAL WITH TRANSVAGINAL; Future  -Will notify of results.     3. Vaginal discharge  -Vaginal culture collected  -Will notify of results.

## 2025-02-11 LAB
C GLABRATA RNA VAG QL NAA+PROBE: NOT DETECTED
CANDIDA RRNA VAG QL PROBE: NOT DETECTED
QUEST FLEXITEST1 RESULTS:: NORMAL

## 2025-02-13 ENCOUNTER — HOSPITAL ENCOUNTER (OUTPATIENT)
Dept: RADIOLOGY | Facility: CLINIC | Age: 40
Discharge: HOME | End: 2025-02-13
Payer: COMMERCIAL

## 2025-02-13 DIAGNOSIS — N94.6 DYSMENORRHEA: ICD-10-CM

## 2025-02-13 PROCEDURE — 76830 TRANSVAGINAL US NON-OB: CPT

## 2025-02-17 ENCOUNTER — TELEPHONE (OUTPATIENT)
Dept: OBSTETRICS AND GYNECOLOGY | Facility: CLINIC | Age: 40
End: 2025-02-17
Payer: COMMERCIAL

## 2025-02-18 PROCEDURE — RXMED WILLOW AMBULATORY MEDICATION CHARGE

## 2025-02-19 ENCOUNTER — SPECIALTY PHARMACY (OUTPATIENT)
Dept: PHARMACY | Facility: CLINIC | Age: 40
End: 2025-02-19

## 2025-02-22 ENCOUNTER — PHARMACY VISIT (OUTPATIENT)
Dept: PHARMACY | Facility: CLINIC | Age: 40
End: 2025-02-22
Payer: COMMERCIAL

## 2025-02-24 ENCOUNTER — APPOINTMENT (OUTPATIENT)
Dept: OBSTETRICS AND GYNECOLOGY | Facility: CLINIC | Age: 40
End: 2025-02-24
Payer: COMMERCIAL

## 2025-02-24 VITALS
DIASTOLIC BLOOD PRESSURE: 80 MMHG | SYSTOLIC BLOOD PRESSURE: 122 MMHG | WEIGHT: 172 LBS | HEIGHT: 60 IN | BODY MASS INDEX: 33.77 KG/M2

## 2025-02-24 DIAGNOSIS — R10.2 PELVIC PAIN IN FEMALE: Primary | ICD-10-CM

## 2025-02-24 DIAGNOSIS — Z80.3 FH: BREAST CANCER: ICD-10-CM

## 2025-02-24 DIAGNOSIS — Z31.41 FERTILITY TESTING: ICD-10-CM

## 2025-02-24 DIAGNOSIS — E28.39 SUPPRESSION OF OVULATION: ICD-10-CM

## 2025-02-24 DIAGNOSIS — Z80.41 FH: OVARIAN CANCER IN FIRST DEGREE RELATIVE: ICD-10-CM

## 2025-02-24 PROCEDURE — 99214 OFFICE O/P EST MOD 30 MIN: CPT | Performed by: OBSTETRICS & GYNECOLOGY

## 2025-02-24 PROCEDURE — 3008F BODY MASS INDEX DOCD: CPT | Performed by: OBSTETRICS & GYNECOLOGY

## 2025-02-24 PROCEDURE — 1036F TOBACCO NON-USER: CPT | Performed by: OBSTETRICS & GYNECOLOGY

## 2025-02-24 RX ORDER — DROSPIRENONE 4 MG/1
4 TABLET, FILM COATED ORAL DAILY
Qty: 90 TABLET | Refills: 3 | Status: SHIPPED | OUTPATIENT
Start: 2025-02-24 | End: 2026-02-24

## 2025-02-24 NOTE — PROGRESS NOTES
Subjective   Patient ID: Elma Cleveland is a 39 y.o. female who presents for consult of her pelvic pain,   Possible adenomyosis dx per TVUS.     Pt reports pelvic pain has gotten significantly worse x 1 year.  Tried multiples COCs and had significant side effects, even tried progesterone only but not Slynd.     No AUB. Always had regular monthly menses.   Reports elevated DHEAs and misdiagnosis of PCOS that was later retracted by endocrinologist.   Known hashimoto thyroiditis. On biologics. On AIP diet (antiinflammatory)    Would like to maintain fertility  6 years pull out method.  Never been pregnant.    TVUS 02/13/2025:   Uterus 5.3 cm x 4.2 cm x 8.3 cm. Heterogenous.  EMS 9mm  Adnexa negative bilaterally.  IMPRESSION:  1. No acute pelvic pathology. Trace pelvic free fluid likely  physiologic.  2. Left corpus luteal cyst measuring 2 cm.  3. Heterogenous myometrium without discrete fibroids. Finding is nonspecific, however could be related to underlying adenomyosis.    Mom dx with ovarian CA, stage 4 at age 71, BRCA neg.  Pat aunt triple neg breast CA, dx age late 60s, BRCA negative.   Breast CA MGM and PGM, both dx later in life 70-80s.  Aunt with hysterectomy for fibroids.  Pt 24 and me negative for BRCA. Knows this is not a formal test.     Hx 02/10/25 ([facility]NP [facility]):   Patient's menstrual cycles are regular every 28 days, lasting 4 days. Denies abnormal bleeding. Reports worsening dysmenorrhea over the past few months. Cramping real bad the first day. Tylenol, celebrex, Ibuprofen, heating pad. Last period was severe pain.   She is sexually active with long-term partner(s). Denies dyspareunia.   Condoms: None. Declines contraception at this time. States she has been on the pill, vaginal ring, and patch in the past but had issues with side effects. H/O HSV-2, treats as needed for outbreaks.   She denies any breast concerns.   Pap hx. normal. Last pap: 03/2023 negative and negative HPV.   Denies pelvic  pain.  Reports vaginal discharge and itching. H/O yeast and BV in the past.       Objective     /80   Ht 1.524 m (5')   Wt 78 kg (172 lb)   LMP 01/27/2025 (Exact Date)   BMI 33.59 kg/m²       Physical Exam  Constitutional:       Appearance: Normal appearance.   HENT:      Head: Normocephalic and atraumatic.   Eyes:      Extraocular Movements: Extraocular movements intact.      Conjunctiva/sclera: Conjunctivae normal.      Pupils: Pupils are equal, round, and reactive to light.   Pulmonary:      Effort: Pulmonary effort is normal.   Skin:     General: Skin is dry.   Neurological:      General: No focal deficit present.      Mental Status: She is alert.   Psychiatric:         Mood and Affect: Mood normal.         Behavior: Behavior normal.         Thought Content: Thought content normal.         Judgment: Judgment normal.         Assessment/Plan   Pelvic pain  - discussed possible etiologies including adenomyosis, endometriosis, high tone pelvic floor  - reviewed workup for pelvic pain. TVUS with possible adenomyosis dx though this is not gold standard. MRI may be helpful for further pelvic pain workup though may have insurance coverage issues.  - possible endometriosis and this could be dx by diagnostic laparoscopy or possibly MRI.  - if she would like to pursue surgery with concern for endometriosis, she may considering referral to [facility] surgeon given that she would like to optimize her move toward fertility and expedite the process.   - Discussed medical management vs surgical management of pelvic pain pending appropriate workup. Regardless of cause, she can try medical management to see if this helps and may consider PFPT in addition  - opts for medical management at this time while she considers her options. Rx Slynd sent and samples given.  - Referral sent to Dr. Marni WHITE.  - Referral sent to High risk breast cancer given her FH breast and ovarian CA  - Referral sent to PFPT.  - Referral sent to  Reproductive Endocrinology for fertility consult.    Scribe Attestation  By signing my name below, I, Teresa Boyd   attest that this documentation has been prepared under the direction and in the presence of Spencer Damian MD.

## 2025-03-18 PROCEDURE — RXMED WILLOW AMBULATORY MEDICATION CHARGE

## 2025-03-20 ENCOUNTER — SPECIALTY PHARMACY (OUTPATIENT)
Dept: PHARMACY | Facility: CLINIC | Age: 40
End: 2025-03-20

## 2025-03-22 LAB
ALBUMIN SERPL-MCNC: 4.4 G/DL (ref 3.6–5.1)
ALP SERPL-CCNC: 57 U/L (ref 31–125)
ALT SERPL-CCNC: 13 U/L (ref 6–29)
ANION GAP SERPL CALCULATED.4IONS-SCNC: 8 MMOL/L (CALC) (ref 7–17)
APPEARANCE UR: CLEAR
AST SERPL-CCNC: 14 U/L (ref 10–30)
BACTERIA #/AREA URNS HPF: ABNORMAL /HPF
BACTERIA UR CULT: ABNORMAL
BASOPHILS # BLD AUTO: 41 CELLS/UL (ref 0–200)
BASOPHILS NFR BLD AUTO: 0.8 %
BILIRUB SERPL-MCNC: 0.5 MG/DL (ref 0.2–1.2)
BILIRUB UR QL STRIP: NEGATIVE
BUN SERPL-MCNC: 18 MG/DL (ref 7–25)
C1Q SERPL-MCNC: NORMAL UG/ML
C3 SERPL-MCNC: NORMAL MG/DL
C4 SERPL-MCNC: NORMAL MG/DL
CALCIUM SERPL-MCNC: 9.3 MG/DL (ref 8.6–10.2)
CHLORIDE SERPL-SCNC: 104 MMOL/L (ref 98–110)
CK SERPL-CCNC: 53 U/L (ref 20–239)
CO2 SERPL-SCNC: 26 MMOL/L (ref 20–32)
COLOR UR: YELLOW
CREAT SERPL-MCNC: 0.78 MG/DL (ref 0.5–0.97)
CRP SERPL-MCNC: NORMAL MG/L
DSDNA AB SER-ACNC: NORMAL [IU]/ML
EGFRCR SERPLBLD CKD-EPI 2021: 100 ML/MIN/1.73M2
EOSINOPHIL # BLD AUTO: 0 CELLS/UL (ref 15–500)
EOSINOPHIL NFR BLD AUTO: 0 %
ERYTHROCYTE [DISTWIDTH] IN BLOOD BY AUTOMATED COUNT: 12.6 % (ref 11–15)
ERYTHROCYTE [SEDIMENTATION RATE] IN BLOOD BY WESTERGREN METHOD: 2 MM/H
GLUCOSE SERPL-MCNC: 90 MG/DL (ref 65–99)
GLUCOSE UR QL STRIP: NEGATIVE
HCT VFR BLD AUTO: 44.6 % (ref 35–45)
HGB BLD-MCNC: 14.6 G/DL (ref 11.7–15.5)
HGB UR QL STRIP: ABNORMAL
HYALINE CASTS #/AREA URNS LPF: ABNORMAL /LPF
KETONES UR QL STRIP: NEGATIVE
LEUKOCYTE ESTERASE UR QL STRIP: NEGATIVE
LYMPHOCYTES # BLD AUTO: 2193 CELLS/UL (ref 850–3900)
LYMPHOCYTES NFR BLD AUTO: 43 %
MCH RBC QN AUTO: 30.3 PG (ref 27–33)
MCHC RBC AUTO-ENTMCNC: 32.7 G/DL (ref 32–36)
MCV RBC AUTO: 92.5 FL (ref 80–100)
MONOCYTES # BLD AUTO: 423 CELLS/UL (ref 200–950)
MONOCYTES NFR BLD AUTO: 8.3 %
NEUTROPHILS # BLD AUTO: 2443 CELLS/UL (ref 1500–7800)
NEUTROPHILS NFR BLD AUTO: 47.9 %
NITRITE UR QL STRIP: NEGATIVE
PH UR STRIP: 6 [PH] (ref 5–8)
PLATELET # BLD AUTO: 276 THOUSAND/UL (ref 140–400)
PMV BLD REES-ECKER: 11.5 FL (ref 7.5–12.5)
POTASSIUM SERPL-SCNC: 4.2 MMOL/L (ref 3.5–5.3)
PROT SERPL-MCNC: 6.8 G/DL (ref 6.1–8.1)
PROT UR QL STRIP: NEGATIVE
RBC # BLD AUTO: 4.82 MILLION/UL (ref 3.8–5.1)
RBC #/AREA URNS HPF: ABNORMAL /HPF
SERVICE CMNT-IMP: ABNORMAL
SODIUM SERPL-SCNC: 138 MMOL/L (ref 135–146)
SP GR UR STRIP: 1.01 (ref 1–1.03)
SQUAMOUS #/AREA URNS HPF: ABNORMAL /HPF
WBC # BLD AUTO: 5.1 THOUSAND/UL (ref 3.8–10.8)
WBC #/AREA URNS HPF: ABNORMAL /HPF

## 2025-03-25 ENCOUNTER — PHARMACY VISIT (OUTPATIENT)
Dept: PHARMACY | Facility: CLINIC | Age: 40
End: 2025-03-25
Payer: COMMERCIAL

## 2025-03-26 ENCOUNTER — TELEPHONE (OUTPATIENT)
Dept: PRIMARY CARE | Facility: CLINIC | Age: 40
End: 2025-03-26
Payer: COMMERCIAL

## 2025-03-26 DIAGNOSIS — E06.3 HASHIMOTO'S THYROIDITIS: Primary | ICD-10-CM

## 2025-03-26 NOTE — TELEPHONE ENCOUNTER
Called and notified pt, who wanted labs to be added on to the specimen, as pt did not want to go back to the lab. Called and spoke with Michael, who was able to add thyroid orders onto the specimen.

## 2025-03-26 NOTE — TELEPHONE ENCOUNTER
Pt called stating [facility] went [facility] to get [facility] work [facility] and asked them specifically if they could the the blood work Nika Fink sent over and they did not. Pt stated Nika needs to call [facility] to add blood work on that was drawn last week. (For thyroid)

## 2025-03-26 NOTE — TELEPHONE ENCOUNTER
Pt is requesting thyroid order to be added to her blood order that was originally ordered by Dr. Thakkar. I can add thyroid if Sunshine is okay with ordering it. Pt last seen on 11/22/2024.

## 2025-03-27 LAB
T3FREE SERPL-MCNC: 3.3 PG/ML (ref 2.3–4.2)
T4 FREE SERPL-MCNC: 1 NG/DL (ref 0.8–1.8)
TSH SERPL-ACNC: 1.38 MIU/L

## 2025-03-27 ASSESSMENT — ENCOUNTER SYMPTOMS
DIARRHEA: 0
HEMATOCHEZIA: 0
VOMITING: 0
BELCHING: 1
ANOREXIA: 0
MYALGIAS: 1
HEMATURIA: 0
HEADACHES: 1
FLATUS: 1
FREQUENCY: 1
FEVER: 0
NAUSEA: 0
DYSURIA: 1
WEIGHT LOSS: 0
CONSTIPATION: 1
ABDOMINAL PAIN: 1
ARTHRALGIAS: 1

## 2025-03-28 LAB
ALBUMIN SERPL-MCNC: 4.4 G/DL (ref 3.6–5.1)
ALP SERPL-CCNC: 57 U/L (ref 31–125)
ALT SERPL-CCNC: 13 U/L (ref 6–29)
ANION GAP SERPL CALCULATED.4IONS-SCNC: 8 MMOL/L (CALC) (ref 7–17)
APPEARANCE UR: CLEAR
AST SERPL-CCNC: 14 U/L (ref 10–30)
BACTERIA #/AREA URNS HPF: ABNORMAL /HPF
BACTERIA UR CULT: ABNORMAL
BASOPHILS # BLD AUTO: 41 CELLS/UL (ref 0–200)
BASOPHILS NFR BLD AUTO: 0.8 %
BILIRUB SERPL-MCNC: 0.5 MG/DL (ref 0.2–1.2)
BILIRUB UR QL STRIP: NEGATIVE
BUN SERPL-MCNC: 18 MG/DL (ref 7–25)
C1Q SERPL-MCNC: 6.6 MG/DL (ref 5–8.6)
C3 SERPL-MCNC: 124 MG/DL (ref 83–193)
C4 SERPL-MCNC: 18 MG/DL (ref 15–57)
CALCIUM SERPL-MCNC: 9.3 MG/DL (ref 8.6–10.2)
CHLORIDE SERPL-SCNC: 104 MMOL/L (ref 98–110)
CK SERPL-CCNC: 53 U/L (ref 20–239)
CO2 SERPL-SCNC: 26 MMOL/L (ref 20–32)
COLOR UR: YELLOW
CREAT SERPL-MCNC: 0.78 MG/DL (ref 0.5–0.97)
CRP SERPL-MCNC: 3.3 MG/L
DSDNA AB SER-ACNC: 12 IU/ML
EGFRCR SERPLBLD CKD-EPI 2021: 100 ML/MIN/1.73M2
EOSINOPHIL # BLD AUTO: 0 CELLS/UL (ref 15–500)
EOSINOPHIL NFR BLD AUTO: 0 %
ERYTHROCYTE [DISTWIDTH] IN BLOOD BY AUTOMATED COUNT: 12.6 % (ref 11–15)
ERYTHROCYTE [SEDIMENTATION RATE] IN BLOOD BY WESTERGREN METHOD: 2 MM/H
GLUCOSE SERPL-MCNC: 90 MG/DL (ref 65–99)
GLUCOSE UR QL STRIP: NEGATIVE
HCT VFR BLD AUTO: 44.6 % (ref 35–45)
HGB BLD-MCNC: 14.6 G/DL (ref 11.7–15.5)
HGB UR QL STRIP: ABNORMAL
HYALINE CASTS #/AREA URNS LPF: ABNORMAL /LPF
KETONES UR QL STRIP: NEGATIVE
LEUKOCYTE ESTERASE UR QL STRIP: NEGATIVE
LYMPHOCYTES # BLD AUTO: 2193 CELLS/UL (ref 850–3900)
LYMPHOCYTES NFR BLD AUTO: 43 %
MCH RBC QN AUTO: 30.3 PG (ref 27–33)
MCHC RBC AUTO-ENTMCNC: 32.7 G/DL (ref 32–36)
MCV RBC AUTO: 92.5 FL (ref 80–100)
MONOCYTES # BLD AUTO: 423 CELLS/UL (ref 200–950)
MONOCYTES NFR BLD AUTO: 8.3 %
NEUTROPHILS # BLD AUTO: 2443 CELLS/UL (ref 1500–7800)
NEUTROPHILS NFR BLD AUTO: 47.9 %
NITRITE UR QL STRIP: NEGATIVE
PH UR STRIP: 6 [PH] (ref 5–8)
PLATELET # BLD AUTO: 276 THOUSAND/UL (ref 140–400)
PMV BLD REES-ECKER: 11.5 FL (ref 7.5–12.5)
POTASSIUM SERPL-SCNC: 4.2 MMOL/L (ref 3.5–5.3)
PROT SERPL-MCNC: 6.8 G/DL (ref 6.1–8.1)
PROT UR QL STRIP: NEGATIVE
RBC # BLD AUTO: 4.82 MILLION/UL (ref 3.8–5.1)
RBC #/AREA URNS HPF: ABNORMAL /HPF
SERVICE CMNT-IMP: ABNORMAL
SODIUM SERPL-SCNC: 138 MMOL/L (ref 135–146)
SP GR UR STRIP: 1.01 (ref 1–1.03)
SQUAMOUS #/AREA URNS HPF: ABNORMAL /HPF
WBC # BLD AUTO: 5.1 THOUSAND/UL (ref 3.8–10.8)
WBC #/AREA URNS HPF: ABNORMAL /HPF

## 2025-04-02 NOTE — PROGRESS NOTES
Division of Minimally Invasive Gynecologic Surgery  [facility]    Date: 4/3/2025 - Gynecology Consult     HISTORY OF PRESENT ILLNESS:  Elma Cleveland 39 y.o. G0 referred by Dr. Damian for pelvic pain and possible adenomyosis.     Her pelvic pain has worsened over the past year (about a year ago is when this all started).     Menses occur monthly, lasting about 4 days (historically she had 7-9 day periods). Menses are very heavy the first day. Progressively worsening dysmenorrhea over the past several months. Celebrex had worked well for daily pain and period pain, but over the past several months Celebrex had stopped working. Pain with periods has gotten so severe that she has presented to urgent care for management.     She does have some pain when not on her period. She has end voiding pain w/ urination. She does have dyschezia as well. She reports deep dyspareunia.     She is interested in fertility. She has been referred to [facility] and PFPT by Dr. Damian. She has not heard from the PFPT team. She was told by [facility] that they would not recommend consultation unless she was actively trying for pregnancy.     Dr. Damian has prescribed Slynd but she has not started this.     She has tried:  - FAM's: has tried multiple, unpleasant side effects with each (headaches, nausea,vomiting), did help w/ period pain   - Ring: Recurrent BV  - Patch: adhesive allergy   - Celebrex: no ineffective  - Flexeril: no improvement  - Aleve: no improvement     Imaging:   - Pelvic US 2/2025 showed uterus measuring 5cm x 4cm x 8cm. Heterogenous myometrium. Suspicious for adenomyosis.   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 Pylori (treated), POTS, ?MCAD  PSHx: laparoscopic cholecystectomy, tonsillectomy/adenoidectomy,  bilateral inguinal hernia repair, knee surgery x 3, cystoscopy for stone   Fhx: ovarian cancer in mother (negative genetic testing)     PAST MEDICAL HISTORY:  Past Medical History:   Diagnosis Date    Acute vaginitis 02/01/2020    Vaginitis and vulvovaginitis    ADHD (attention deficit hyperactivity disorder)     Allergic     Anesthesia of skin 10/03/2017    Right arm numbness    Carpal tunnel syndrome, left upper limb 12/20/2016    Left carpal tunnel syndrome    Concussion with loss of consciousness of unspecified duration, subsequent encounter     Concussion with loss of consciousness of unspecified duration, subsequent encounter    Diarrhea, unspecified 11/08/2018    Acute diarrhea    Disease of thyroid gland     Dry eye syndrome of bilateral lacrimal glands     Insufficiency of tear film of both eyes    Encounter for surveillance of contraceptive pills 04/25/2018    Encounter for birth control pills maintenance    GERD (gastroesophageal reflux disease)     Hashimoto's disease     Headache, unspecified 07/05/2019    Mixed headache    Hypo-osmolality and hyponatremia 04/05/2020    Hyponatremia    Immunization not carried out because of patient refusal 07/05/2019    Influenza vaccination declined    Left lower quadrant abdominal tenderness 08/22/2017    Abdominal left lower quadrant tenderness    Left lower quadrant pain 11/17/2016    Colicky LLQ abdominal pain    Other conditions influencing health status 08/22/2017    History of chronic diarrhea    Other intervertebral disc displacement, lumbar region     Herniated lumbar disc without myelopathy    Other malaise 09/10/2020    Malaise and fatigue    Other specified noninflammatory disorders of vagina     Vaginal lesion    Other symptoms and signs involving the musculoskeletal system 10/04/2018    Weakness of right foot    Other symptoms and signs involving the musculoskeletal system 09/28/2018    Weakness of right foot    Other symptoms and signs involving the  musculoskeletal system 10/02/2018    Weakness of right lower extremity    Pain in right knee 04/25/2018    Right knee pain    Pain in unspecified foot 09/10/2020    Foot pain    Pelvic and perineal pain 06/05/2020    Pelvic pain    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    Personal history of diseases of the blood and blood-forming organs and certain disorders involving the immune mechanism     History of autoimmune disorder    Personal history of diseases of the skin and subcutaneous tissue 06/10/2016    History of allergic urticaria    Personal history of other benign neoplasm     History of adenoma of adrenal gland    Personal history of other diseases of the circulatory system 10/25/2016    History of irregular heartbeat    Personal history of other diseases of the digestive system 07/05/2019    History of left inguinal hernia    Personal history of other diseases of the digestive system 02/28/2018    History of hemorrhoids    Personal history of other diseases of the female genital tract 05/22/2018    History of menorrhagia    Personal history of other diseases of the musculoskeletal system and connective tissue 09/02/2020    History of joint pain    Personal history of other diseases of the musculoskeletal system and connective tissue 09/10/2020    History of joint pain    Personal history of other diseases of the respiratory system 02/28/2018    History of acute sinusitis    Personal history of other diseases of the respiratory system     History of asthma    Personal history of other diseases of the respiratory system 12/21/2016    History of acute sinusitis    Personal history of other endocrine, nutritional and metabolic disease 04/06/2020    History of hyperkalemia    Personal history of other infectious and parasitic diseases     History of mononucleosis    Personal history of other specified conditions 07/05/2019    History of nausea    Personal history of  other specified conditions 03/27/2019    History of palpitations    Personal history of other specified conditions 08/22/2017    History of urinary frequency    Personal history of other specified conditions 08/12/2021    History of dizziness    Personal history of other specified conditions 07/05/2019    History of headache    Personal history of other specified conditions 09/10/2020    History of numbness    Personal history of other specified conditions 12/30/2016    History of weight gain    Personal history of traumatic brain injury 06/04/2018    History of concussion    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)     Rash and other nonspecific skin eruption 06/10/2016    Rash of unknown cause    Rheumatoid arthritis     Right upper quadrant pain 12/03/2019    Right upper quadrant abdominal pain    Syncope     Syncope and collapse 02/28/2018    Vasovagal near syncope    Tremor, unspecified 12/19/2016    Tremor of right hand    Unspecified abdominal pain 12/12/2019    Abdominal pain of unknown etiology    Unspecified asthma with (acute) exacerbation ([facility]) 02/28/2018    Acute asthma exacerbation    Unspecified symptoms and signs involving the genitourinary system 12/05/2019    UTI symptoms    Urinary tract infection     Urogenital trichomoniasis        PAST SURGICAL HISTORY:  Past Surgical History:   Procedure Laterality Date    ADENOIDECTOMY  10/24/2016    Adenoidectomy    CHOLECYSTECTOMY      GALLBLADDER SURGERY  06/10/2016    Gallbladder Surgery    HERNIA REPAIR  06/10/2016    Hernia Repair    KNEE SURGERY Left 06/10/2016    2001, 2007, 2008    LYMPH NODE BIOPSY      OTHER SURGICAL HISTORY  10/24/2016    Deep Cervical Lymph Node Biopsy    TONSILLECTOMY  10/24/2016    Tonsillectomy       PHYSICAL EXAMINATION:  VITAL SIGNS: /86   Pulse 102   Ht 1.524 m (5')   Wt 78.9 kg (174 lb)   LMP 03/20/2025   BMI 33.98 kg/m²      Constitutional:  No acute distress, well-nourished and  well-developed  HEENT: EOM grossly intact, MMM, neck supple and with full ROM  Pulm:  Effort normal. No accessory muscle usage.  No respiratory distress.  Neurological:  She is alert and oriented to person place and time.  Skin: Warm, no pallor.  Psychiatric:  She has normal mood and affect.    ASSESSMENT:  Elma Cleveland 39 y.o. G0 referred by Dr. Damian for pelvic pain and possible adenomyosis.   - We discussed today the multiple etiologies of chronic pelvic pain, including endometriosis, adenomyosis, GI etiologies, MSK etiologies, and centralization of pain. I am suspicious for both adenomyosis and endometriosis in her case. We discussed the role of surgical excision in the management of endometriosis and the estimated 15-20% recurrence rate in the future. We also reviewed the association between endometriosis and adenomyosis, as well as the fact that definitive diagnosis of adenomyosis cannot be made without hysterectomy.   - We discussed the natural history of endometriosis and the fact that hormonal suppression is thought to have a role in slowing disease progression and managing symptoms of endometriosis, but cannot definitively reverse or eliminate endometriosis.   - She has tried multiple medical approaches and has not gotten adequate relief.   - I do think surgical excision is appropriate for this patient, and she desires to proceed with an aggressive surgical approach. We did however discuss that given the likelihood of pelvic floor dysfunction and the possibility of centralization of pain, she may not experience complete relief from surgery. She is aware she may require further treatment for these etiologies of pain after surgery.   - We reviewed the risks/benefits/alternatives to surgery. She is aware of the risk of bleeding, infection, and damage to nearby structures, including reproductive structures, bladder, ureters, bowel, and vasculature. She is agreeable to blood transfusion if recommended. We  reviewed the possibility of appendectomy, as well as the administration of antibiotics if appendectomy is performed. She understands and is in agreement with this plan.  - I recommend preoperative MRI to evaluate for the presence of deep infiltrating endometriosis, with particular attention to bowel endometriosis.    PLAN:  - Robotic excision of endometriosis, chromopertubation, appendectomy, possible Mirena, any indicated procedure. PAT: Yes. Main only   - She strongly desires opportunistic appendectomy and I do think this is reasonable   - MRI pelvis for surgical planning. TVUS inadequate evaluation for endometriosis.   - Strongly encouraged her to consider Mirena as she is not actively trying for pregnancy and I do suspect adenomyosis.   - Neurology referral for suspected POTS   - Recommend follow up w/ PFPT. New referral placed, will send scheduling #, online directory, and yoga for pelvic pain via [employer].     Faby Grider MD  Division of Minimally Invasive Gynecologic Surgery  [facility]    Answers submitted by the patient for this visit:  Abdominal Pain Questionnaire (Submitted on 3/27/2025)  Chief Complaint: Abdominal pain  Chronicity: chronic  Onset: more than 1 year ago  Onset quality: undetermined  Frequency: intermittently  Episode duration: 4 Days  Progression since onset: gradually worsening  Pain location: LLQ  Pain - numeric: 6/10  Pain quality: aching, cramping, sharp  Radiates to: suprapubic region  anorexia: No  arthralgias: Yes  belching: Yes  constipation: Yes  diarrhea: No  dysuria: Yes  fever: No  flatus: Yes  frequency: Yes  headaches: Yes  hematochezia: No  hematuria: No  melena: No  myalgias: Yes  nausea: No  weight loss: No  vomiting: No  Aggravated by: certain positions  Relieved by: nothing  Diagnostic workup: lower endoscopy, ultrasound, upper endoscopy

## 2025-04-03 ENCOUNTER — OFFICE VISIT (OUTPATIENT)
Dept: OBSTETRICS AND GYNECOLOGY | Facility: CLINIC | Age: 40
End: 2025-04-03
Payer: COMMERCIAL

## 2025-04-03 VITALS
BODY MASS INDEX: 34.16 KG/M2 | DIASTOLIC BLOOD PRESSURE: 86 MMHG | HEART RATE: 102 BPM | HEIGHT: 60 IN | WEIGHT: 174 LBS | SYSTOLIC BLOOD PRESSURE: 124 MMHG

## 2025-04-03 DIAGNOSIS — N80.9 ENDOMETRIOSIS: ICD-10-CM

## 2025-04-03 DIAGNOSIS — G90.A POTS (POSTURAL ORTHOSTATIC TACHYCARDIA SYNDROME): ICD-10-CM

## 2025-04-03 DIAGNOSIS — N80.03 ADENOMYOSIS: Primary | ICD-10-CM

## 2025-04-03 DIAGNOSIS — R10.2 PELVIC PAIN: ICD-10-CM

## 2025-04-03 PROCEDURE — 99215 OFFICE O/P EST HI 40 MIN: CPT | Performed by: STUDENT IN AN ORGANIZED HEALTH CARE EDUCATION/TRAINING PROGRAM

## 2025-04-03 PROCEDURE — 1036F TOBACCO NON-USER: CPT | Performed by: STUDENT IN AN ORGANIZED HEALTH CARE EDUCATION/TRAINING PROGRAM

## 2025-04-03 PROCEDURE — 99205 OFFICE O/P NEW HI 60 MIN: CPT | Performed by: STUDENT IN AN ORGANIZED HEALTH CARE EDUCATION/TRAINING PROGRAM

## 2025-04-03 PROCEDURE — 3008F BODY MASS INDEX DOCD: CPT | Performed by: STUDENT IN AN ORGANIZED HEALTH CARE EDUCATION/TRAINING PROGRAM

## 2025-04-03 RX ORDER — CELECOXIB 400 MG/1
400 CAPSULE ORAL ONCE
OUTPATIENT
Start: 2025-04-03 | End: 2025-04-03

## 2025-04-03 RX ORDER — METRONIDAZOLE 500 MG/100ML
500 INJECTION, SOLUTION INTRAVENOUS ONCE
OUTPATIENT
Start: 2025-04-03 | End: 2025-04-03

## 2025-04-03 RX ORDER — THYROID 15 MG/1
15 TABLET ORAL
COMMUNITY

## 2025-04-03 RX ORDER — CEFAZOLIN SODIUM 2 G/100ML
2 INJECTION, SOLUTION INTRAVENOUS ONCE
OUTPATIENT
Start: 2025-04-03 | End: 2025-04-03

## 2025-04-03 RX ORDER — THYROID 30 MG/1
30 TABLET ORAL DAILY
COMMUNITY

## 2025-04-03 RX ORDER — ACETAMINOPHEN 325 MG/1
975 TABLET ORAL ONCE
OUTPATIENT
Start: 2025-04-03 | End: 2025-04-03

## 2025-04-03 RX ORDER — GABAPENTIN 600 MG/1
600 TABLET ORAL ONCE
OUTPATIENT
Start: 2025-04-03 | End: 2025-04-03

## 2025-04-03 ASSESSMENT — ENCOUNTER SYMPTOMS
NEUROLOGICAL NEGATIVE: 0
EYES NEGATIVE: 0
ALLERGIC/IMMUNOLOGIC NEGATIVE: 0
RESPIRATORY NEGATIVE: 0
CARDIOVASCULAR NEGATIVE: 0
GASTROINTESTINAL NEGATIVE: 0
PSYCHIATRIC NEGATIVE: 0
HEMATOLOGIC/LYMPHATIC NEGATIVE: 0
MUSCULOSKELETAL NEGATIVE: 0
ENDOCRINE NEGATIVE: 0
CONSTITUTIONAL NEGATIVE: 0

## 2025-04-03 ASSESSMENT — PAIN SCALES - GENERAL: PAINLEVEL_OUTOF10: 5

## 2025-04-06 ENCOUNTER — HOSPITAL ENCOUNTER (OUTPATIENT)
Dept: RADIOLOGY | Facility: HOSPITAL | Age: 40
Discharge: HOME | End: 2025-04-06
Payer: COMMERCIAL

## 2025-04-06 DIAGNOSIS — N80.9 ENDOMETRIOSIS: ICD-10-CM

## 2025-04-06 DIAGNOSIS — R10.2 PELVIC PAIN: ICD-10-CM

## 2025-04-06 DIAGNOSIS — N80.03 ADENOMYOSIS: ICD-10-CM

## 2025-04-06 PROCEDURE — 2550000001 HC RX 255 CONTRASTS: Performed by: STUDENT IN AN ORGANIZED HEALTH CARE EDUCATION/TRAINING PROGRAM

## 2025-04-06 PROCEDURE — 72197 MRI PELVIS W/O & W/DYE: CPT

## 2025-04-06 PROCEDURE — 72197 MRI PELVIS W/O & W/DYE: CPT | Performed by: STUDENT IN AN ORGANIZED HEALTH CARE EDUCATION/TRAINING PROGRAM

## 2025-04-06 PROCEDURE — A9575 INJ GADOTERATE MEGLUMI 0.1ML: HCPCS | Performed by: STUDENT IN AN ORGANIZED HEALTH CARE EDUCATION/TRAINING PROGRAM

## 2025-04-06 RX ORDER — GADOTERATE MEGLUMINE 376.9 MG/ML
20 INJECTION INTRAVENOUS
Status: COMPLETED | OUTPATIENT
Start: 2025-04-06 | End: 2025-04-06

## 2025-04-06 RX ADMIN — GADOTERATE MEGLUMINE 16 ML: 376.9 INJECTION INTRAVENOUS at 13:01

## 2025-04-08 ENCOUNTER — APPOINTMENT (OUTPATIENT)
Dept: ENDOCRINOLOGY | Facility: CLINIC | Age: 40
End: 2025-04-08
Payer: COMMERCIAL

## 2025-04-10 PROCEDURE — RXMED WILLOW AMBULATORY MEDICATION CHARGE

## 2025-04-11 ENCOUNTER — OFFICE VISIT (OUTPATIENT)
Dept: RHEUMATOLOGY | Facility: CLINIC | Age: 40
End: 2025-04-11
Payer: COMMERCIAL

## 2025-04-11 VITALS
HEIGHT: 60 IN | OXYGEN SATURATION: 100 % | DIASTOLIC BLOOD PRESSURE: 86 MMHG | WEIGHT: 174 LBS | HEART RATE: 84 BPM | SYSTOLIC BLOOD PRESSURE: 119 MMHG | TEMPERATURE: 97.1 F | BODY MASS INDEX: 34.16 KG/M2 | RESPIRATION RATE: 18 BRPM

## 2025-04-11 DIAGNOSIS — Q79.62 HYPERMOBILE EHLERS-DANLOS SYNDROME (HHS-HCC): ICD-10-CM

## 2025-04-11 DIAGNOSIS — K21.9 GASTROESOPHAGEAL REFLUX DISEASE, UNSPECIFIED WHETHER ESOPHAGITIS PRESENT: ICD-10-CM

## 2025-04-11 DIAGNOSIS — M06.09 POLYARTHRITIS WITH NEGATIVE RHEUMATOID FACTOR (MULTI): Primary | ICD-10-CM

## 2025-04-11 DIAGNOSIS — E06.3 HASHIMOTO'S THYROIDITIS: ICD-10-CM

## 2025-04-11 DIAGNOSIS — E55.9 VITAMIN D DEFICIENCY: ICD-10-CM

## 2025-04-11 DIAGNOSIS — Z79.899 ENCOUNTER FOR LONG-TERM (CURRENT) USE OF MEDICATIONS: ICD-10-CM

## 2025-04-11 DIAGNOSIS — R76.8 POSITIVE ANA (ANTINUCLEAR ANTIBODY): ICD-10-CM

## 2025-04-11 PROCEDURE — 3008F BODY MASS INDEX DOCD: CPT | Performed by: INTERNAL MEDICINE

## 2025-04-11 PROCEDURE — 99214 OFFICE O/P EST MOD 30 MIN: CPT | Performed by: INTERNAL MEDICINE

## 2025-04-11 RX ORDER — NAPROXEN 500 MG/1
500 TABLET ORAL 2 TIMES DAILY PRN
Qty: 180 TABLET | Refills: 1 | Status: SHIPPED | OUTPATIENT
Start: 2025-04-11 | End: 2025-12-07

## 2025-04-11 RX ORDER — PANTOPRAZOLE SODIUM 40 MG/1
40 TABLET, DELAYED RELEASE ORAL
Qty: 90 TABLET | Refills: 3 | Status: SHIPPED | OUTPATIENT
Start: 2025-04-11 | End: 2026-04-11

## 2025-04-11 RX ORDER — THYROID 60 MG/1
60 TABLET ORAL DAILY
Qty: 30 TABLET | Refills: 3 | Status: SHIPPED | OUTPATIENT
Start: 2025-04-11 | End: 2025-04-16

## 2025-04-11 RX ORDER — NAPROXEN 500 MG/1
500 TABLET ORAL 2 TIMES DAILY PRN
Qty: 60 TABLET | Refills: 5 | Status: SHIPPED | OUTPATIENT
Start: 2025-04-11 | End: 2025-04-11

## 2025-04-11 ASSESSMENT — ROUTINE ASSESSMENT OF PATIENT INDEX DATA (RAPID3)
FEELINGS_ANXIETY_NERVOUS: WITH SOME DIFFICULTY
PARTIPATE_RECREATIONAL_ACTIVITIES: WITH SOME DIFFICULTY
ON A SCALE OF ONE TO TEN, HOW MUCH PAIN HAVE YOU HAD BECAUSE OF YOUR CONDITION OVER THE PAST WEEK?: 6
IN_OUT_TRANSPORT: WITHOUT ANY DIFFICULTY
TOTAL RAPID3 SCORE: 11.3
ON A SCALE OF ONE TO TEN, CONSIDERING ALL THE WAYS IN WHICH ILLNESS AND HEALTH CONDITIONS MAY AFFECT YOU AT THIS TIME, PLEASE INDICATE BELOW HOW YOU ARE DOING:: 5
IN_OUT_BED: WITHOUT ANY DIFFICULTY
SEVERITY_SCORE: MODERATE SEVERITY (MS)
WEIGHTED_TOTAL_SCORE: 3.77
FN_SCORE: 0.3
SEVERITY_SCORE: 0
TURN_FAUCETS_OFF: WITHOUT ANY DIFFICULTY
SUM OF QUESTIONS A TO J: 1
WALK_KILOMETERS: WITHOUT ANY DIFFICULTY
DRESS_YOURSELF: WITHOUT ANY DIFFICULTY
ON A SCALE OF ONE TO TEN, HOW MUCH PAIN HAVE YOU HAD BECAUSE OF YOUR CONDITION OVER THE PAST WEEK?: 6
ON A SCALE OF ONE TO TEN, CONSIDERING ALL THE WAYS IN WHICH ILLNESS AND HEALTH CONDITIONS MAY AFFECT YOU AT THIS TIME, PLEASE INDICATE BELOW HOW YOU ARE DOING:: 5
FEELINGS_DEPRESSION: WITH SOME DIFFICULTY
PICK_CLOTHES_OFF_FLOOR: WITHOUT ANY DIFFICULTY
WALK_FLAT_GROUND: WITHOUT ANY DIFFICULTY
WASH_DRY_BODY: WITHOUT ANY DIFFICULTY
LIFT_CUP_TO_MOUTH: WITHOUT ANY DIFFICULTY
GOOD_NIGHTS_SLEEP: WITH SOME DIFFICULTY

## 2025-04-11 ASSESSMENT — COLUMBIA-SUICIDE SEVERITY RATING SCALE - C-SSRS
6. HAVE YOU EVER DONE ANYTHING, STARTED TO DO ANYTHING, OR PREPARED TO DO ANYTHING TO END YOUR LIFE?: NO
2. HAVE YOU ACTUALLY HAD ANY THOUGHTS OF KILLING YOURSELF?: NO
1. IN THE PAST MONTH, HAVE YOU WISHED YOU WERE DEAD OR WISHED YOU COULD GO TO SLEEP AND NOT WAKE UP?: NO

## 2025-04-11 ASSESSMENT — PAIN SCALES - GENERAL: PAINLEVEL_OUTOF10: 6

## 2025-04-11 ASSESSMENT — PATIENT HEALTH QUESTIONNAIRE - PHQ9
10. IF YOU CHECKED OFF ANY PROBLEMS, HOW DIFFICULT HAVE THESE PROBLEMS MADE IT FOR YOU TO DO YOUR WORK, TAKE CARE OF THINGS AT HOME, OR GET ALONG WITH OTHER PEOPLE: SOMEWHAT DIFFICULT
SUM OF ALL RESPONSES TO PHQ9 QUESTIONS 1 AND 2: 2
1. LITTLE INTEREST OR PLEASURE IN DOING THINGS: SEVERAL DAYS
2. FEELING DOWN, DEPRESSED OR HOPELESS: SEVERAL DAYS

## 2025-04-11 NOTE — PROGRESS NOTES
[facility]/  Rheumatology  [facility] [facility], [address]  [place] [address]  Phone: [phone]  Fax: [phone]    Rheumatology Progress Note 04/11/2025      Elma Cleveland is a 39 y.o. female here for   Chief Complaint   Patient presents with    Follow-up       Last Visit: 12/11/24    Rheum Hx  Referred by for: CRP ELEVATED/FOOT PAIN/ JOINT  PAIN/MALAISE AND FATIGUE/NUMBNESS  Patient has seen multiple rheumatologists including Dr. Crespo,  Dr. Caballero, and Dr. Mercado.  Per available records review Dr. Caballero diagnosed patient with  question of undifferentiated connective tissue disease. Patient has  a history of joint pains and high EVELIN at the age of 16 years old.  Found to have RNP slightly elevated. Negative HLA B27, RF, CCP,  Anca, CRP, uric acid, CBC, SPEP, LAC  Patient also saw Dr. Kumar who diagnosed her with chronic  pain, likely undifferentiated connective tissue disease with positive  EVELIN low RNP and Hashimoto's history  It looks a patient has been treated with non steroid antiinflammatory  drug and narcotics.  Patient apparently has also seen Dermatology but  unfortunately could not tolerate the Xyzal. She had a full body rash  with Plaquenil.  Chief complaint: Joint pain, muscle tightness  Since 16 years old  Slow onset  Intermittent course  Precipitating factors: None  Associated symptoms: Pain in joints, night sweats, low-grade  fevers, tiredness  Better: Aleve, sleep  Worse: Stress  Previous treatments naproxen-somewhat helpful  Ibuprofen-somewhat helpful  Meloxicam-no help  Diclofenac-no help  Celecoxib-no help  Acetaminophen-no help  Steroids-very helpful  Plaquenil-discontinued due to rash  Occupation: RN    Previous Rheum note reviewed. Dx with UCTD as a teenager. Low + EVELIN and RNP. Diagnosed by Dr Caballero. Mother Dx sith SLE. Then saw Dr Kumar and Dr Layne.  EVELIN 1:80 2016  Chronic urticaria/Multiple allergies/Asthma  LAD, s/p bx- benign  PCOS  Benign joint  hypermobility.   Hypothyroidism  Since 16 y/o after mono, everything snowballed from there.  Patient has had multiple to and including joint aches, infection  once a year bronchitis that will not go way. States nothing has been  done. 3 days full (not usually) body rash treated with steroids for  over a month.  Usual have rash is high pinpoint nonpruritic. Patient saw  allergist and told she is allergic to life. Patient takes Zyrtec for day  to help with the itchiness and rashes.  Does not have PCOS. Patient cannot take birth control has  makes her sick. Off Aldactone notes horrible acne and cystic acne.  Currently tenderness of bilateral elbows- left elbow is always  the worst, upper spine and lower spine, left knee and cas 3rd 4th  PIPs  No noted swelling  30 minutes to 1 hour of morning stiffness feels muscle  tightness that never seems to go away  Has had deep tissue massage 2-3/wk told very tight knots  She has never tried muscle relaxant or acupuncture.  History of right tennis elbow  Severe back pain more recently tightness lower back and hips  sometimes arms. Notes response in past to Synthroid.  Review of systems positive for:  Fatigue  LN >1 yr- biopsy normal per pt- ?reactive lymphoid  hyperplasia  Dry eyes, constant dry nose  Canker sores not as freq  R ear pulsatile tinnitus/MRA MRI unremarkable; ? scar tissue  +HSV  History of lung disease- Asthma cough variant, not well  controlled  Vocal cord dysfunction due to reflux- Pepcid has almost fixed  it; pantoprazole not working as well; to see GI  Alb incrased heart rate; on Xopenex and alb neb prn  Echo- slight pericardial thickening, ?elevated RVSP; hx of  vasovagal syncope from stomach pain  Gastroesophageal reflux disease  Abdominal pain/ prominent Peyer's patches/ no  gallbladder/dumping syndrome/ Bentyl or Levsin sometimes help  History of kidney stone  Rashes- hormonla; increased DHEAS; no adrenal hyperplasia;  cysts that come and go; cortisol test  done during her night shift  work  Hx of 3 L knee surgeries  Joint pain  Morning stiffness  Back pain that wakes her up from sleep approved upon getting  up and with activity and with rest  Myalgias  Hx of concussion  Weakness right foot- Saw Neuro and did PT up and down; MRI  spine showed herniated disc; EMP upper arm skin couldn't even  feel it.; nystagmus and tiredness  Frequent infections including bacterial vaginosis, trichomonas  History of thyroid disease- +TPO, very symptomatci in 2014  and at TSH level 5; will be following with Endo  Allergies  Depression  Component      Latest Ref Rng 5/22/2018 12/3/2019   Tissue Transglutaminase, IgA      0 - 14  <1     Tissue Transglutamase IgG      0 - 14  <1     DEAMIDATED GLIADIN PEPTIDE IGA      0 - 14  1     DEAMIDATED GLIADIN PEPTIDE IGG      0 - 14  <1     Mononucleosis Screen      NEGATIVE   NEGATIVE      Component      Latest Ref Rng 9/14/2020 12/8/2020   EVELIN      NEGATIVE  POSITIVE !     EVELIN Titer 1:80     EVELIN Pattern HOMOGENEOUS     Anti-SM      AI <0.2     Anti-RNP      AI 2.0 !     Anti-SM/RNP      AI <0.2     Anti-SSA      AI <0.2     Anti-SSB      AI <0.2     Anti-SCL-70      AI <0.2     Anti-RANDY-1 IgG      AI <0.2     Anti-Chromatin      AI <0.2     Anti-Centromere      AI <0.2     ANTI-RIBOSOMAL P      AI <0.2     Anti-DNA (DS)      IU/mL 1.0     C-ANCA (PR3) BY EIA  (Note)…    C-ANCA FLOURESCENCE  Negative…    P-ANCA (MPO) BY EIA  (Note)…    P-ANCA FLOURESCENCE  Negative…    ANCP INTERPRETATION  (Note)…    Staff Review  (Note)…    IgG Cardiolipin Ab  <9.0…    IgM Cardiolipin Ab  20.8… (H)    IgA Cardiolipin Ab  11.2…    Beta-2 Glyco 1 IgG  <9…    Beta 2 Glyco 1 IgM  <9…    Angiotensin 1 Conv  26…    Beta-2 Glyco 1 IgA  1…    Centromere Ab  Negative…      Component      Latest Ref Rng 3/17/2021 8/4/2022   IgG Cardiolipin Ab <9.0…     IgM Cardiolipin Ab 10.1…     IgA Cardiolipin Ab 12.4… (H)     PTT-LA  37.0…    DRVVT  37.1…    Lupus Anticoagulant  Interpretation  No LAC   SSA ANTIBODIES <0.2…     SSB ANTIBODIES <0.2…          Previous Tx  naproxen-somewhat helpful  Ibuprofen-somewhat helpful  Meloxicam-no help  Diclofenac-no help  Celecoxib-no help  Acetaminophen-no help  Steroids-very helpful  Plaquenil-discontinued due to rash    Health Maintenance  DXA- none  Malignancy Hx- none  Component      Latest Ref Rng 4/25/2019 12/3/2019 12/8/2020   Herpes Simplex I IgG <0.2…      HSV 1, IgG Negative…      Herpes Simplex 2 IgG <0.2…      HSV 2, IgG Negative…      HERPES SIMPLEX IGM 0.79…      HSV IgM Qualitative Negative…      Mononucleosis Screen      NEGATIVE   NEGATIVE     Hepatitis B Surface AG      NEG    NEGATIVE    Hepatitis C AB   Negative…    Lyme Antibodies Screen   Negative…      Immunization History   Administered Date(s) Administered    AS03 Adjuvant 09/23/2021    Flu vaccine (IIV4), preservative free *Check age/dose* 09/15/2019, 10/01/2020    Flu vaccine, quadrivalent, high-dose, preservative free, age 65y+ (FLUZONE) 09/23/2021    Flu vaccine, quadrivalent, no egg protein, age 6 month or greater (FLUCELVAX) 11/01/2023    Influenza, Unspecified 10/19/2011, 09/26/2012, 10/04/2013, 11/04/2014, 09/26/2017, 10/20/2022, 10/12/2023    Influenza, seasonal, injectable 10/22/2013, 10/30/2019    Influenza, trivalent, adjuvanted 10/14/2022    MMR vaccine, subcutaneous (MMR II) 09/01/1998    Moderna COVID-19 vaccine, 12 years and older (50mcg/0.5mL)(Spikevax) 11/13/2023    Moderna COVID-19 vaccine, bivalent, blue cap/gray label *Check age/dose* 11/12/2022    Moderna SARS-CoV-2 Vaccination 05/28/2021, 06/25/2021, 02/04/2022    PPD Test 12/25/2010, 04/04/2011    Tdap vaccine, age 7 year and older (BOOSTRIX, ADACEL) 09/16/2019    Zoster vaccine, recombinant, adult (SHINGRIX) 08/15/2022          Past Medical History:   Diagnosis Date    Acute vaginitis 02/01/2020    Vaginitis and vulvovaginitis    ADHD (attention deficit hyperactivity disorder)     Allergic      Anesthesia of skin 10/03/2017    Right arm numbness    Carpal tunnel syndrome, left upper limb 12/20/2016    Left carpal tunnel syndrome    Concussion with loss of consciousness of unspecified duration, subsequent encounter     Concussion with loss of consciousness of unspecified duration, subsequent encounter    Diarrhea, unspecified 11/08/2018    Acute diarrhea    Disease of thyroid gland     Dry eye syndrome of bilateral lacrimal glands     Insufficiency of tear film of both eyes    Encounter for surveillance of contraceptive pills 04/25/2018    Encounter for birth control pills maintenance    GERD (gastroesophageal reflux disease)     Hashimoto's disease     Headache, unspecified 07/05/2019    Mixed headache    Hypo-osmolality and hyponatremia 04/05/2020    Hyponatremia    Immunization not carried out because of patient refusal 07/05/2019    Influenza vaccination declined    Left lower quadrant abdominal tenderness 08/22/2017    Abdominal left lower quadrant tenderness    Left lower quadrant pain 11/17/2016    Colicky LLQ abdominal pain    Other conditions influencing health status 08/22/2017    History of chronic diarrhea    Other intervertebral disc displacement, lumbar region     Herniated lumbar disc without myelopathy    Other malaise 09/10/2020    Malaise and fatigue    Other specified noninflammatory disorders of vagina     Vaginal lesion    Other symptoms and signs involving the musculoskeletal system 10/04/2018    Weakness of right foot    Other symptoms and signs involving the musculoskeletal system 09/28/2018    Weakness of right foot    Other symptoms and signs involving the musculoskeletal system 10/02/2018    Weakness of right lower extremity    Pain in right knee 04/25/2018    Right knee pain    Pain in unspecified foot 09/10/2020    Foot pain    Pelvic and perineal pain 06/05/2020    Pelvic pain    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    Personal  history of diseases of the blood and blood-forming organs and certain disorders involving the immune mechanism     History of autoimmune disorder    Personal history of diseases of the skin and subcutaneous tissue 06/10/2016    History of allergic urticaria    Personal history of other benign neoplasm     History of adenoma of adrenal gland    Personal history of other diseases of the circulatory system 10/25/2016    History of irregular heartbeat    Personal history of other diseases of the digestive system 07/05/2019    History of left inguinal hernia    Personal history of other diseases of the digestive system 02/28/2018    History of hemorrhoids    Personal history of other diseases of the female genital tract 05/22/2018    History of menorrhagia    Personal history of other diseases of the musculoskeletal system and connective tissue 09/02/2020    History of joint pain    Personal history of other diseases of the musculoskeletal system and connective tissue 09/10/2020    History of joint pain    Personal history of other diseases of the respiratory system 02/28/2018    History of acute sinusitis    Personal history of other diseases of the respiratory system     History of asthma    Personal history of other diseases of the respiratory system 12/21/2016    History of acute sinusitis    Personal history of other endocrine, nutritional and metabolic disease 04/06/2020    History of hyperkalemia    Personal history of other infectious and parasitic diseases     History of mononucleosis    Personal history of other specified conditions 07/05/2019    History of nausea    Personal history of other specified conditions 03/27/2019    History of palpitations    Personal history of other specified conditions 08/22/2017    History of urinary frequency    Personal history of other specified conditions 08/12/2021    History of dizziness    Personal history of other specified conditions 07/05/2019    History of headache     Personal history of other specified conditions 09/10/2020    History of numbness    Personal history of other specified conditions 12/30/2016    History of weight gain    Personal history of traumatic brain injury 06/04/2018    History of concussion    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)     Rash and other nonspecific skin eruption 06/10/2016    Rash of unknown cause    Rheumatoid arthritis     Right upper quadrant pain 12/03/2019    Right upper quadrant abdominal pain    Syncope     Syncope and collapse 02/28/2018    Vasovagal near syncope    Tremor, unspecified 12/19/2016    Tremor of right hand    Unspecified abdominal pain 12/12/2019    Abdominal pain of unknown etiology    Unspecified asthma with (acute) exacerbation ([facility]) 02/28/2018    Acute asthma exacerbation    Unspecified symptoms and signs involving the genitourinary system 12/05/2019    UTI symptoms    Urinary tract infection     Urogenital trichomoniasis       Past Surgical History:   Procedure Laterality Date    ADENOIDECTOMY  10/24/2016    Adenoidectomy    CHOLECYSTECTOMY      GALLBLADDER SURGERY  06/10/2016    Gallbladder Surgery    HERNIA REPAIR  06/10/2016    Hernia Repair    KNEE SURGERY Left 06/10/2016    2001, 2007, 2008    LYMPH NODE BIOPSY      OTHER SURGICAL HISTORY  10/24/2016    Deep Cervical Lymph Node Biopsy    TONSILLECTOMY  10/24/2016    Tonsillectomy      Current Outpatient Medications   Medication Sig Dispense Refill    acyclovir (Zovirax) 5 % ointment 1 Application.      budesonide-formoterol (Symbicort) 160-4.5 mcg/actuation inhaler 2 inhalation 2 times daily Inhalation 10.2 g 3    busPIRone (Buspar) 5 mg tablet Take 1 tablet (5 mg) by mouth 2 times a day. 60 tablet 11    celecoxib (CeleBREX) 400 mg capsule Take 1 capsule (400 mg) by mouth 2 times a day as needed.      certolizumab pegol (Cimzia) injection INJECT 2 SYRINGES (400MG) UNDER THE SKIN ONCE EVERY 4 WEEKS. 2 mL 11    cholecalciferol (Vitamin D-3) 50  MCG (2000 UT) tablet Take 1 tablet (2,000 Units) by mouth once daily.      cyclobenzaprine (Flexeril) 10 mg tablet Take 1 tablet (10 mg) by mouth once daily at bedtime. PRN 90 tablet 3    drospirenone, contraceptive, (Slynd) 4 mg (28) tablet Take 1 tablet by mouth once daily. 90 tablet 3    famotidine (Pepcid) 40 mg tablet Take 1 tablet (40 mg) by mouth every 12 hours. 60 tablet 1    fexofenadine (Allegra Allergy) 180 mg tablet Take 2 tablets in the morning orally 60 tablet 1    ipratropium (Atrovent) 21 mcg (0.03 %) nasal spray Administer 2 sprays into each nostril every 12 hours. 30 mL 1    levalbuterol (Xopenex HFA) 45 mcg/actuation inhaler Inhale 2 puffs every 6 hours.      mometasone (Nasonex) 50 mcg/actuation nasal spray Administer into affected nostril(s).      thyroid, pork, (NP Thyroid) 15 mg tablet tablet Take 1 tablet (15 mg) by mouth once daily in the morning. Take before meals.      thyroid, pork, (NP Thyroid) 30 mg tablet Take 1 tablet (30 mg) by mouth once daily.       No current facility-administered medications for this visit.      Allergies   Allergen Reactions    Ciprofloxacin Other     Reaction: Vomiting    Codeine Sulfate Other     NIGHTMARES AND SLEEPWALKING    Hydroxychloroquine Rash     Full body rash    Oxycodone-Acetaminophen Itching     full body itching    Tuberculin Ppd Unknown and Rash     ALLERGY TO BOTH APLISOL AND TUBERSOL CAUSING REDNESS, RASH AND IRRITATION UNDER SKIN.  ANNUAL SCREENING IS A TB GOLD.    Codeine Hives    Gum Tzqzpj-Fcugyo-Tlpp-Alcohol Unknown    Sodium Lauroylsarcosinate Unknown    Sodium Lauryl Sarcosinate Unknown    Adhesive Tape-Silicones Rash    Blue Dye Hives and Rash    Dibucaine Hives and Rash     Other Reaction(s): Unknown    Gold Keratinate Hives and Rash    Gold Sodium Thiosulfate Rash    Lidocaine Hives and Rash    Linalool Hives and Rash    Nickel Hives and Rash    Sodium Laureth Sulfate Rash, Unknown and Hives    Sodium Thiosulfate Rash    Thimerosal  Hives and Rash    Wound Dressings Hives and Rash     Other Reaction(s): hives    Yellow Dye Hives and Rash      Visit Vitals  /86 (BP Location: Right arm, Patient Position: Sitting, BP Cuff Size: Adult long)   Pulse 84   Temp 36.2 °C (97.1 °F) (Temporal)   Resp 18   Ht 1.524 m (5')   Wt 78.9 kg (174 lb)   SpO2 100%   BMI 33.98 kg/m²   OB Status Having periods   Smoking Status Never   BSA 1.83 m²              Rapid 3  Function Score (FN): 0.3  Pain Score (PN) (0-10): 6  Patient Global (PTGL) (0-10): 5  Rapid3 Score: 11.3      Workup  Component      Latest Ref [facility] 3/21/2025   WHITE BLOOD CELL COUNT      3.8 - 10.8 Thousand/uL 5.1    RED BLOOD CELL COUNT      3.80 - 5.10 Million/uL 4.82    HEMOGLOBIN      11.7 - 15.5 g/dL 14.6    HEMATOCRIT      35.0 - 45.0 % 44.6    MCV      80.0 - 100.0 fL 92.5    MCH      27.0 - 33.0 pg 30.3    MCHC      32.0 - 36.0 g/dL 32.7    RDW      11.0 - 15.0 % 12.6    PLATELET COUNT      140 - 400 Thousand/uL 276    MPV      7.5 - 12.5 fL 11.5    ABSOLUTE NEUTROPHILS      1,500 - 7,800 cells/uL 2,443    ABSOLUTE LYMPHOCYTES      850 - 3,900 cells/uL 2,193    ABSOLUTE MONOCYTES      200 - 950 cells/uL 423    ABSOLUTE EOSINOPHILS      15 - 500 cells/uL 0 (L)    ABSOLUTE BASOPHILS      0 - 200 cells/uL 41    NEUTROPHILS      % 47.9    LYMPHOCYTES      % 43.0    MONOCYTES      % 8.3    EOSINOPHILS      % 0.0    BASOPHILS      % 0.8    COLOR      YELLOW  YELLOW    APPEARANCE      CLEAR  CLEAR    SPECIFIC GRAVITY      1.001 - 1.035  1.011    PH      5.0 - 8.0  6.0    GLUCOSE      NEGATIVE  NEGATIVE    BILIRUBIN      NEGATIVE  NEGATIVE    KETONES      NEGATIVE  NEGATIVE    OCCULT BLOOD      NEGATIVE  TRACE !    PROTEIN      NEGATIVE  NEGATIVE    NITRITE      NEGATIVE  NEGATIVE    LEUKOCYTE ESTERASE      NEGATIVE  NEGATIVE    WBC      < OR = 5 /HPF NONE SEEN    RBC      < OR = 2 /HPF NONE SEEN    SQUAMOUS EPITHELIAL CELLS      < OR = 5 /HPF NONE SEEN    BACTERIA      NONE SEEN /HPF NONE SEEN     HYALINE CAST      NONE SEEN /LPF NONE SEEN    NOTE --    CULTURE, URINE, ROUTINE --    GLUCOSE      65 - 99 mg/dL 90    UREA NITROGEN (BUN)      7 - 25 mg/dL 18    CREATININE      0.50 - 0.97 mg/dL 0.78    EGFR      > OR = 60 mL/min/1.73m2 100    SODIUM      135 - 146 mmol/L 138    POTASSIUM      3.5 - 5.3 mmol/L 4.2    CHLORIDE      98 - 110 mmol/L 104    CARBON DIOXIDE      20 - 32 mmol/L 26    ELECTROLYTE BALANCE      7 - 17 mmol/L (calc) 8    CALCIUM      8.6 - 10.2 mg/dL 9.3    PROTEIN, TOTAL      6.1 - 8.1 g/dL 6.8    ALBUMIN      3.6 - 5.1 g/dL 4.4    BILIRUBIN, TOTAL      0.2 - 1.2 mg/dL 0.5    ALKALINE PHOSPHATASE      31 - 125 U/L 57    AST      10 - 30 U/L 14    ALT      6 - 29 U/L 13    CREATINE KINASE, TOTAL      20 - 239 U/L 53    COMPLEMENT COMPONENT C1Q      5.0 - 8.6 mg/dL 6.6    SED RATE BY MODIFIED WESTERGREN      < OR = 20 mm/h 2    DNA (DS) ANTIBODY      IU/mL 12 (H)    COMPLEMENT COMPONENT C3C      83 - 193 mg/dL 124    COMPLEMENT COMPONENT C4C      15 - 57 mg/dL 18    C-REACTIVE PROTEIN      <8.0 mg/L 3.3       Rapid 3  Function Score (FN): 0.3  Pain Score (PN) (0-10): 6  Patient Global (PTGL) (0-10): 5  Rapid3 Score: 11.3  RAPID3 Weighted Score: 3.77      Assessment/Plan  No diagnosis found.           No orders of the defined types were placed in this encounter.         Since last appt, adherent and tolerating Cimzia.  Celebrex prn- helpful  SSZ on hold. The flushing was likely related to the allergies and has gotten better with tx.  Trying to do antinfl diet. Helped her feet a lot.   Daily vit D with K OTC- helping  Hands, wrists and elbows still ache.  Recent anaphylaxis to coconut.  2 Allegra, 4 Pepcid and 2 Zyrtec for MAS. For possible MAS biologic.   Neck LN that was enlarged, now small  Hx of biopsy of LN- R   Denies any recent or current infection.  Not on any NSAIDs or glucocorticoids.  ROS+ for fatigue, sicca with red/painful eyes without vision loss, sore throat, swollen  glands/nodes, cough, GERD/GI issues, rashes, joint pain/stiffness, back pain, raynaud's without pitting/ulceration, depression/anxiety.  Rapid 3 consistent with   Labs reviewed  D/w pt tx options and decided on   Continue Cimzia  Resume SSZ  Retry antiinfl diet  If doing well, can wean off SSZ.  Vit D with K  Advised of possible side effects and importance of monitoring.   All questions answered.  Patient to follow up with primary care provider regarding all other medical issues not addressed today and for medical chart updating.         Since last appt, adherent and tolerating Cimzia.  Celebrex not as helpful  Major flare 2 wks ago. Fingers, wrists, elbows, hips, knees. Heel   Tx for H pylori in past and EGD and colonoscopy. Tx with PPI.  Took Medrol dose pack. Around the last day and off, pain really bad   Was taking SSZ up  to recentlyand resumed about a week ago. Thinks it helps but hard to tell.   Was doing ok before  Denies any recent or current infection.  Not on any  glucocorticoids.  ROS+ for drenching night sweats without fever, fatigue, GERD/abd pain, rashes, joint pain, back pain  Rapid 3 consistent with moderate severity  Labs reviewed  D/w pt tx options and decided on ***  Advised of possible side effects and importance of monitoring.   All questions answered.  Patient to follow up with primary care provider regarding all other medical issues not addressed today and for medical chart updating.     Genesis Bauer MD      Patient Care Team:  Nika Fink PA-C as PCP - General (Family Medicine)  Margaret Gould DO as PCP - Employee [employer] PCP  Marisela Aguilar DO as Primary Care Provider  Genesis Bauer MD as Consulting Physician (Rheumatology)      pain  Rapid 3 consistent with moderate severity  Labs reviewed  D/w pt tx options   Advised of possible side effects and importance of monitoring.   All questions answered.  Patient to follow up with primary care provider regarding all other medical issues not addressed today and for medical chart updating.     Genesis Bauer MD      Patient Care Team:  Nika Fink PA-C as PCP - General (Family Medicine)  Nika Fink PA-C as PCP - Employee [employer] PCP  Marisela Aguilar DO as Primary Care Provider  Genesis Bauer MD as Consulting Physician (Rheumatology)

## 2025-04-14 PROBLEM — N80.03 ADENOMYOSIS: Status: ACTIVE | Noted: 2025-04-03

## 2025-04-14 PROBLEM — N80.9 ENDOMETRIOSIS: Status: ACTIVE | Noted: 2025-04-03

## 2025-04-14 PROBLEM — R10.2 PELVIC PAIN: Status: ACTIVE | Noted: 2025-04-03

## 2025-04-15 ENCOUNTER — PHARMACY VISIT (OUTPATIENT)
Dept: PHARMACY | Facility: CLINIC | Age: 40
End: 2025-04-15
Payer: COMMERCIAL

## 2025-04-15 ENCOUNTER — SPECIALTY PHARMACY (OUTPATIENT)
Dept: PHARMACY | Facility: CLINIC | Age: 40
End: 2025-04-15

## 2025-04-15 PROCEDURE — RXMED WILLOW AMBULATORY MEDICATION CHARGE

## 2025-04-16 ENCOUNTER — PHARMACY VISIT (OUTPATIENT)
Dept: PHARMACY | Facility: CLINIC | Age: 40
End: 2025-04-16
Payer: COMMERCIAL

## 2025-04-16 DIAGNOSIS — E06.3 HASHIMOTO'S THYROIDITIS: ICD-10-CM

## 2025-04-16 RX ORDER — LEVOTHYROXINE, LIOTHYRONINE 38; 9 UG/1; UG/1
60 TABLET ORAL DAILY
Qty: 90 TABLET | Refills: 1 | Status: SHIPPED | OUTPATIENT
Start: 2025-04-16

## 2025-04-22 DIAGNOSIS — Z01.818 PREOP EXAMINATION: Primary | ICD-10-CM

## 2025-05-05 ENCOUNTER — EVALUATION (OUTPATIENT)
Dept: PHYSICAL THERAPY | Facility: CLINIC | Age: 40
End: 2025-05-05
Payer: COMMERCIAL

## 2025-05-05 DIAGNOSIS — R10.2 PELVIC PAIN: ICD-10-CM

## 2025-05-05 PROCEDURE — 97162 PT EVAL MOD COMPLEX 30 MIN: CPT | Mod: GP | Performed by: PHYSICAL THERAPIST

## 2025-05-05 NOTE — PROGRESS NOTES
Physical Therapy Pelvic Floor Evaluation    Patient Name: Elma Cleveland  MRN: [id number]  Evaluation Date: 5/5/2025  Time Calculation  Start Time: 1100  Stop Time: 1200  Time Calculation (min): 60 min  PT Evaluation Time Entry  PT Evaluation (Moderate) Time Entry: 30           Problem List Items Addressed This Visit           ICD-10-CM    Pelvic pain R10.2        Subjective    Precautions:   EDS, POTS,     Pain:     See below    PELVIC HISTORY:  Chief Complaint/Description of Symptoms:   Last year noticed change of period from 7-9 days to 4 days and being very heavy with more cramps.    Then noticed Orgasm was harder to achieve and intercourse was painful.  Partner is now nervous because doesn't want to hurt patient.    Started autoimmune protocol in August and noticed periods way more painful was taking clebrex and advil without improvement in pain. Had MRI and US.  To have surgery in June for adenomyosis/endometriosis;    October Mother diagnosed with ovarian cancer-- caused a lot of stress.     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      OB HISTORY:  none    BLADDER:     Daytime Voiding Frequency: sometimes can hold bladder 12 hours then goes every 20 minutes  Nighttime Voiding Frequency: 1-2 times   Excessive Urinary Urgency: yes   Unintentional urine loss: yes   Leakage occurs with: urgency   Peeing in just in case;      BOWEL:     BM Frequency: does report IBS and constipation   Frequent constipation/straining/incomplete emptying: incomplete emptying   Unintentional stool loss: no    FLUID/DIET INTAKE:  Tried to  remember to drink enough    EXERCISE:  Current exercise regime:   Sometimes goes to gym with partner     Objective   POSTURE/ALIGNMENT:  Reduced lumbar lordosis,        ROM:    Hip AROM L R   All hip planes ROM  WNL deg -- hypermobility WNL deg - hypermobility      MMT:  Hip MMT L R   Hip Flexion 4/5 4/5   Hip Abduction 4/5 4/5      TA: fair TA activation, poor load transfer with active SLR,   Tends to upper chest breathe, able to 360 when cued    Patient tearful during evaluation     PELVIC FLOOR  Deferred till next session    Outcome Measures:  NIH-CPSI: 13  Pain: 6  Urinary: 3  Quality of Life Impact: 4     Treatments:  Therapeutic Exercise:  Non billable  Modified happy baby x 3 3-4 breathes  Wipers x 10   SKTC  x 3 304 breathes  Piriformis stretch x 3 3-4 breathes     OP EDUCATION:  Outpatient Education  Individual(s) Educated: Patient  Education Provided: Anatomy, Home Exercise Program, Physiology, POC  Risk and Benefits Discussed with Patient/Caregiver/Other: yes  Patient/Caregiver Demonstrated Understanding: yes  Plan of Care Discussed and Agreed Upon: yes  Patient Response to Education: Patient/Caregiver Verbalized Understanding of Information, Patient/Caregiver Performed Return Demonstration of Exercises/Activities, Patient/Caregiver Asked Appropriate Questions    Assessment   PT Assessment Results: Decreased strength, Decreased range of motion, Decreased coordination, Impaired tone, Pain  Rehab Prognosis: Good  Evaluation/Treatment Tolerance: Patient tolerated treatment well    Pt is a 39 y.o. female who presents with impairments of weakness, tightness, pain, stress and anxiety. These impairments have led to functional limitations including urinary urgency, pain with intercourse and or examination, and constipation. Pt would benefit from skilled physical therapy intervention to improve above impairments and facilitate return to function.     Complexity of Evaluation: Moderate    Based on the history  including personal factors and/or comorbidities, examination of body systems including body structures and function, activity limitations, and/or participation restrictions, as well as clinical presentation, patient meets criteria for a Moderate complexity evaluation.    Plan:  Treatment/Interventions: Biofeedback, Dry needling, Education/ Instruction, Electrical stimulation, Hot pack, Manual therapy, Neuromuscular re-education, Therapeutic exercises, Therapeutic activities  PT Plan: Skilled PT  PT Frequency: 1 time per week  Duration: 10 sessions  Number of Treatments Authorized: await authorizations  Rehab Potential: Good  Plan of Care Agreement: Patient    Insurance Plan: Payor:  EMPLOYEE MEDICAL PLAN / Plan:  EMPLOYEE MEDICAL PLAN TRADITIONAL  / Product Type: *No Product type* /     Next session:  internal if consent, stretching, breathing, downtraining    Goals:     Pelvic Floor     STGs 5 sessions  1)  Patient will be knowledgeable with regards to downtraining techniques to improve relaxation of pelvic floor muscles  2)  Patient will report 25% less pain/symptoms during intercourse  3)  Patient will perform diaphragmatic breathing properly to relax and contract pelvic floor muscle appropriately  4)  Patient will demonstrate good transverse abdominis activation to stabilize lumbopelvic girdle during ADLs  5)  Patient will report successful use of urge suppression strategies at least 50% of the time      LTGs 10 sessions  1)  Patient will report improved sleep with less waking to void (will only wake 1-2 times a night)   2)  Patient will increase tolerance to internal penetration for examination with min to nil pain  3)  Patient will reduce urine loss to nil during walking to bathroom  4)  Pain will reduce to by 75% in lower abdominal region  5)  Patient will improve pelvic floor contraction to by 1/2-1 MMT  with coordinated exhale for improved continence

## 2025-05-08 ENCOUNTER — SPECIALTY PHARMACY (OUTPATIENT)
Dept: PHARMACY | Facility: CLINIC | Age: 40
End: 2025-05-08

## 2025-05-12 ENCOUNTER — SPECIALTY PHARMACY (OUTPATIENT)
Dept: PHARMACY | Facility: CLINIC | Age: 40
End: 2025-05-12

## 2025-05-12 ENCOUNTER — TELEPHONE (OUTPATIENT)
Dept: OBSTETRICS AND GYNECOLOGY | Facility: CLINIC | Age: 40
End: 2025-05-12
Payer: COMMERCIAL

## 2025-05-12 DIAGNOSIS — Z01.818 PREOP EXAMINATION: Primary | ICD-10-CM

## 2025-05-12 NOTE — TELEPHONE ENCOUNTER
Patient aware she will have [facility] appointment, advised if she has not heard from them 2 weeks before surgery to let us know.

## 2025-05-12 NOTE — TELEPHONE ENCOUNTER
Spoke with patient, states she wanted to know if you had heard back from radiologist regarding second opinion on her MRI.  Patient also wanted to know if she needed to stop Cimzia before or after surgery, she is due to take 5/22 and then 6/19 (6 days post-op).  Patient also wanted to know if she needed to stop her sulfasalazine pre-op, she is taking 500 mg twice a day. Message forwarded to Dr. Grider.

## 2025-05-14 ENCOUNTER — PATIENT MESSAGE (OUTPATIENT)
Dept: RHEUMATOLOGY | Facility: CLINIC | Age: 40
End: 2025-05-14
Payer: COMMERCIAL

## 2025-05-16 ENCOUNTER — SPECIALTY PHARMACY (OUTPATIENT)
Dept: PHARMACY | Facility: CLINIC | Age: 40
End: 2025-05-16

## 2025-05-16 ENCOUNTER — PHARMACY VISIT (OUTPATIENT)
Dept: PHARMACY | Facility: CLINIC | Age: 40
End: 2025-05-16
Payer: COMMERCIAL

## 2025-05-16 PROCEDURE — RXMED WILLOW AMBULATORY MEDICATION CHARGE

## 2025-05-20 ENCOUNTER — SPECIALTY PHARMACY (OUTPATIENT)
Dept: PHARMACY | Facility: CLINIC | Age: 40
End: 2025-05-20

## 2025-05-23 ENCOUNTER — TREATMENT (OUTPATIENT)
Dept: PHYSICAL THERAPY | Facility: CLINIC | Age: 40
End: 2025-05-23
Payer: COMMERCIAL

## 2025-05-23 DIAGNOSIS — R10.2 PELVIC PAIN: ICD-10-CM

## 2025-05-23 PROCEDURE — 97110 THERAPEUTIC EXERCISES: CPT | Mod: GP | Performed by: PHYSICAL THERAPIST

## 2025-05-23 PROCEDURE — 97140 MANUAL THERAPY 1/> REGIONS: CPT | Mod: GP | Performed by: PHYSICAL THERAPIST

## 2025-05-23 NOTE — PROGRESS NOTES
Physical Therapy Treatment    Patient Name: Elma Cleveland  MRN: [id number]  Encounter date:  5/23/2025  Time Calculation  Start Time: 0702  Stop Time: 0758  Time Calculation (min): 56 min     PT Therapeutic Procedures Time Entry  Manual Therapy Time Entry: 23  Therapeutic Exercise Time Entry: 25  Therapeutic Activity Time Entry: 8       Visit Number:  2 (including evaluation)/10  Planned total visits: 10 per POC  Visits Authorized/Insurance Coverage:  05/16/2025:  10V PT APPROVED 05/05/2025-08/01/2025  APPROVED CODES: 12128, [id number], [id number], [id number]    2025: EVAL ONLY, AUTH REQ. 25.00 CO PAY, 100% COVERAGE, 30V PT/ST, [facility] E[facility]     Current Problem  Problem List Items Addressed This Visit           ICD-10-CM    Pelvic pain R10.2     Precautions     EDS, POTS    Pain     0/10    Subjective  General        No intercourse because partner is too nervous to hurt patient more;  Hasn't noticed pain with urination.  Using trash can for bowel movements.  Questions if she has teathered cord.  Has been recovering from strep throat.    Objective  Hypertonic pelvic floor bilaterally pressure reported but no pain.      Treatment:  Therapeutic Activity:    Discussed squatty potty and reasons for benefit  Use of abdominal massage to assist in releasing stool   Therapeutic exercise:    Happy baby modified  3 3-4 breathes   Happy baby with use of ball 55cm x 3 3-4 breathes  LTR with 55cm ball x 10   SKTC x 10   Wipers x 10   Piriformis stretch with 55cm ball x 3 304 breathes  Sitting on 55cm theraball:    CW/CCW x 10   PPT/APT x 10   Lateral sways x 10   Manual:    STM and trigger point release bilateral to levator ani and OI -- patient in hooklying, cues for breathing to assist with relaxation of pelvic floor muscles.       Current HEP:  Modified happy baby x 3 3-4 breathes  Wipers x 10   SKTC  x 3 304 breathes  Piriformis stretch x 3 3-4 breathes     Has patient been compliant with HEP? Yes    OP EDUCATION:     Bowel elimination,  additional stretching to try    Assessment:     Pt's response to treatment:  Hypertonic pelvic floor most likely contributing to dyspareunia, urinary frequency and urgency at time and constipation.  Patient to discuss with physician tethered cord diagnosis.      Pain end of session: 0    Plan:     Continue with current POC/no changes    Assessment of current progress against goals:  Insufficient treatment time to assess progress    Goals:     Pelvic Floor     STGs 5 sessions  1)  Patient will be knowledgeable with regards to downtraining techniques to improve relaxation of pelvic floor muscles  2)  Patient will report 25% less pain/symptoms during intercourse  3)  Patient will perform diaphragmatic breathing properly to relax and contract pelvic floor muscle appropriately  4)  Patient will demonstrate good transverse abdominis activation to stabilize lumbopelvic girdle during ADLs  5)  Patient will report successful use of urge suppression strategies at least 50% of the time      LTGs 10 sessions  1)  Patient will report improved sleep with less waking to void (will only wake 1-2 times a night)   2)  Patient will increase tolerance to internal penetration for examination with min to nil pain  3)  Patient will reduce urine loss to nil during walking to bathroom  4)  Pain will reduce to by 75% in lower abdominal region  5)  Patient will improve pelvic floor contraction to by 1/2-1 MMT  with coordinated exhale for improved continence

## 2025-05-28 ENCOUNTER — TREATMENT (OUTPATIENT)
Dept: PHYSICAL THERAPY | Facility: CLINIC | Age: 40
End: 2025-05-28
Payer: COMMERCIAL

## 2025-05-28 DIAGNOSIS — R10.2 PELVIC PAIN: ICD-10-CM

## 2025-05-28 PROCEDURE — 97530 THERAPEUTIC ACTIVITIES: CPT | Mod: GP | Performed by: PHYSICAL THERAPIST

## 2025-05-28 PROCEDURE — 97140 MANUAL THERAPY 1/> REGIONS: CPT | Mod: GP | Performed by: PHYSICAL THERAPIST

## 2025-05-28 NOTE — PROGRESS NOTES
Physical Therapy Treatment    Patient Name: Elma Cleveland  MRN: [id number]  Encounter date:  5/28/2025  Time Calculation  Start Time: 0830  Stop Time: 0930  Time Calculation (min): 60 min     PT Therapeutic Procedures Time Entry  Manual Therapy Time Entry: 35  Therapeutic Activity Time Entry: 10       Visit Number:  3 (including evaluation)/10  Planned total visits: 10 per POC  Visits Authorized/Insurance Coverage:  05/16/2025:  10V PT APPROVED 05/05/2025-08/01/2025  APPROVED CODES: 03742, [id number], 18164, [id number]    2025: EVAL ONLY, AUTH REQ. 25.00 CO PAY, 100% COVERAGE, 30V PT/ST, [facility]     Current Problem  Problem List Items Addressed This Visit           ICD-10-CM    Pelvic pain R10.2     Precautions     EDS, POTS    Pain     0/10    Subjective  General        Had intercourse with partner and still had pain with deep thrusting.  Changed position and this was better.      Objective  Pain at OI and deeper pelvic floor of levator ani R>L    Treatment:  Manual:  with verbal consent to internal exam and treatment  STM and trigger point release to OI and pelvic floor bilateral R>L patient in hooklying, cues for breathing to assist with relaxation of pelvic floor muscles.    Abdominal/colon massage  Therapeutic Activity:   Discussed constipation, fiber and diet      Current HEP:  Modified happy baby x 3 3-4 breathes  Wipers x 10   SKTC  x 3 304 breathes  Piriformis stretch x 3 3-4 breathes     Has patient been compliant with HEP? Yes    OP EDUCATION:     Bowel elimination, additional stretching to try    Assessment:     Pt's response to treatment:  continues to have pain with intercourse.  Patient with trigger points in deeper levator ani that were able to be released with trigger point release and STM.        Pain end of session: 0    Plan:     Continue with current POC/no changes    Assessment of current progress against goals:  Progressing toward established goals.      Goals:     Pelvic Floor     STGs 5  sessions  1)  Patient will be knowledgeable with regards to downtraining techniques to improve relaxation of pelvic floor muscles  2)  Patient will report 25% less pain/symptoms during intercourse  3)  Patient will perform diaphragmatic breathing properly to relax and contract pelvic floor muscle appropriately  4)  Patient will demonstrate good transverse abdominis activation to stabilize lumbopelvic girdle during ADLs  5)  Patient will report successful use of urge suppression strategies at least 50% of the time      LTGs 10 sessions  1)  Patient will report improved sleep with less waking to void (will only wake 1-2 times a night)   2)  Patient will increase tolerance to internal penetration for examination with min to nil pain  3)  Patient will reduce urine loss to nil during walking to bathroom  4)  Pain will reduce to by 75% in lower abdominal region  5)  Patient will improve pelvic floor contraction to by 1/2-1 MMT  with coordinated exhale for improved continence

## 2025-05-30 ENCOUNTER — PRE-ADMISSION TESTING (OUTPATIENT)
Dept: PREADMISSION TESTING | Facility: HOSPITAL | Age: 40
End: 2025-05-30
Payer: COMMERCIAL

## 2025-05-30 VITALS
BODY MASS INDEX: 35.75 KG/M2 | TEMPERATURE: 96.8 F | HEART RATE: 97 BPM | DIASTOLIC BLOOD PRESSURE: 82 MMHG | RESPIRATION RATE: 18 BRPM | OXYGEN SATURATION: 98 % | HEIGHT: 60 IN | SYSTOLIC BLOOD PRESSURE: 127 MMHG | WEIGHT: 182.1 LBS

## 2025-05-30 PROCEDURE — 99204 OFFICE O/P NEW MOD 45 MIN: CPT | Performed by: NURSE PRACTITIONER

## 2025-05-30 RX ORDER — CETIRIZINE HYDROCHLORIDE 10 MG/1
10 TABLET, CHEWABLE ORAL DAILY
COMMUNITY

## 2025-05-30 ASSESSMENT — DUKE ACTIVITY SCORE INDEX (DASI)
CAN YOU HAVE SEXUAL RELATIONS: YES
CAN YOU WALK A BLOCK OR TWO ON LEVEL GROUND: YES
CAN YOU PARTICIPATE IN MODERATE RECREATIONAL ACTIVITIES LIKE GOLF, BOWLING, DANCING, DOUBLES TENNIS OR THROWING A BASEBALL OR FOOTBALL: YES
CAN YOU DO MODERATE WORK AROUND THE HOUSE LIKE VACUUMING, SWEEPING FLOORS OR CARRYING GROCERIES: YES
CAN YOU DO HEAVY WORK AROUND THE HOUSE LIKE SCRUBBING FLOORS OR LIFTING AND MOVING HEAVY FURNITURE: YES
CAN YOU PARTICIPATE IN STRENOUS SPORTS LIKE SWIMMING, SINGLES TENNIS, FOOTBALL, BASKETBALL, OR SKIING: NO
CAN YOU DO LIGHT WORK AROUND THE HOUSE LIKE DUSTING OR WASHING DISHES: YES
CAN YOU RUN A SHORT DISTANCE: YES
CAN YOU DO YARD WORK LIKE RAKING LEAVES, WEEDING OR PUSHING A MOWER: YES
CAN YOU CLIMB A FLIGHT OF STAIRS OR WALK UP A HILL: YES
TOTAL_SCORE: 50.7
DASI METS SCORE: 9
CAN YOU WALK INDOORS, SUCH AS AROUND YOUR HOUSE: YES
CAN YOU TAKE CARE OF YOURSELF (EAT, DRESS, BATHE, OR USE TOILET): YES

## 2025-05-30 ASSESSMENT — PAIN - FUNCTIONAL ASSESSMENT: PAIN_FUNCTIONAL_ASSESSMENT: 0-10

## 2025-05-30 ASSESSMENT — PAIN SCALES - GENERAL: PAINLEVEL_OUTOF10: 2

## 2025-05-30 NOTE — PREPROCEDURE INSTRUCTIONS
Medication List            Accurate as of May 30, 2025 10:08 AM. Always use your most recent med list.                acyclovir 5 % ointment  Commonly known as: Zovirax  Medication Adjustments for Surgery: Do Not take on the morning of surgery     budesonide-formoterol 160-4.5 mcg/actuation inhaler  Commonly known as: Symbicort  2 inhalation 2 times daily Inhalation  Medication Adjustments for Surgery: Take/Use as prescribed     busPIRone 5 mg tablet  Commonly known as: Buspar  Take 1 tablet (5 mg) by mouth 2 times a day.  Medication Adjustments for Surgery: Take/Use as prescribed     cetirizine 10 mg chewable tablet  Commonly known as: ZyrTEC  Medication Adjustments for Surgery: Take last dose 1 day (24 hours) before surgery  Notes to patient: Hold the night before and the day of surgery= Hold x 24 hours     cholecalciferol 50 mcg (2,000 units) tablet  Commonly known as: Vitamin D-3  Additional Medication Adjustments for Surgery: Take last dose 7 days before surgery  Notes to patient: Last day to take is 6/5 then hold until after surgery     Cimzia injection  Generic drug: certolizumab pegol  INJECT 2 SYRINGES (400MG) UNDER THE SKIN ONCE EVERY 4 WEEKS.  Medication Adjustments for Surgery: Take/Use as prescribed  Notes to patient: Last dose  Due     cyclobenzaprine 10 mg tablet  Commonly known as: Flexeril  Take 1 tablet (10 mg) by mouth once daily at bedtime. PRN  Medication Adjustments for Surgery: Take/Use as prescribed     famotidine 40 mg tablet  Commonly known as: Pepcid  Take 1 tablet (40 mg) by mouth every 12 hours.  Medication Adjustments for Surgery: Take on the morning of surgery     fexofenadine 180 mg tablet  Commonly known as: Allegra Allergy  Take 2 tablets in the morning orally  Medication Adjustments for Surgery: Do Not take on the morning of surgery     ipratropium 21 mcg (0.03 %) nasal spray  Commonly known as: Atrovent  Administer 2 sprays into each nostril every 12 hours.  Medication  Adjustments for Surgery: Take/Use as prescribed     naproxen 500 mg tablet  Commonly known as: Naprosyn  Take 1 tablet (500 mg) by mouth 2 times a day as needed for mild pain (1 - 3).  Additional Medication Adjustments for Surgery: Take last dose 7 days before surgery  Notes to patient: Last day to take is 6/5 then hold until after surgery     Nasonex 50 mcg/actuation nasal spray  Generic drug: mometasone  Medication Adjustments for Surgery: Take/Use as prescribed     NP Thyroid 60 mg tablet  Generic drug: thyroid (pork)  TAKE 1 TABLET BY MOUTH ONCE DAILY.  Medication Adjustments for Surgery: Take on the morning of surgery     pantoprazole 40 mg EC tablet  Commonly known as: Protonix  Take 1 tablet (40 mg) by mouth once daily in the morning. Take before meals. Do not crush, chew, or split.  Medication Adjustments for Surgery: Take on the morning of surgery     Slynd 4 mg (28) tablet  Generic drug: drospirenone (contraceptive)  Take 1 tablet by mouth once daily.  Medication Adjustments for Surgery: Do Not take on the morning of surgery     Xopenex HFA 45 mcg/actuation inhaler  Generic drug: levalbuterol  Medication Adjustments for Surgery: Take/Use as prescribed                              NPO Instructions:    Do not eat any food after midnight the night before your surgery/procedure.  You may have clear liquids until TWO hours before surgery/procedure. This includes water, black tea/coffee, (no milk or cream) apple juice and electrolyte drinks (Gatorade).  You may chew gum up to TWO hours before your surgery/procedure.    Additional Instructions:     Day of Surgery:  Review your medication instructions, take indicated medications  [facility] patients, drink your Carbohydrate drink TWO hours before surgery  You may have clear liquids until TWO hours before surgery/procedure.  This includes water, black tea/coffee, (no milk or cream) apple juice and electrolyte drinks (Gatorade)  You may chew gum up to TWO hours before  your surgery/procedure  Wear  comfortable loose fitting clothing  Do not use moisturizers, creams, lotions or perfume  All jewelry and valuables should be left at home                    PRE-OPERATIVE INSTRUCTIONS FOR SURGERY    *Do not eat anything after midnight the night of surgery.  This includes food of any kind (including hard candy, cough drops, mints).     You may have up to 13 ounces of clear liquid until TWO hours prior to your scheduled arrival time  Clear liquids include water, black tea/coffee, (no milk or cream) apple juice and electrolyte drinks (GATORADE).  You may chew gum until TWO hours prior you your surgery/procedure.         -----------  SHOWER THE NIGHT BEFORE SURGERY AND DAY OF SURGERY  *One of our staff members will call you ONE business day before your surgery, between 11am-2 pm to let you know the time to arrive.  If you have not received a call by 2 pm, call [phone]    *When you arrive at the hospital-->GO TO Registration on the ground floor  *Stop smoking 24 hours prior to surgery.  No Marijuana, CBD Oil or Vaping for 48 hours  *No alcohol 24 hours prior to surgery  *You will need a responsible adult to drive you home  -No acrylic nails or nail polish on at least one fingernail, NO polish on toes for foot surgery  -You may be asked to remove your dentures, partial plate, eyeglasses or contact lenses before going to surgery.  Please bring a case for these items.  -Body piercings need to be removed.  Jewelry and valuables should be left at home.  -Put on loose,  comfortable, clean clothing, that will accommodate bandages    *If you have any further questions about your pre-op instructions,  not mentioned in this handout, then call [phone]*    What you may be asked to bring to surgery:  ___Crutches, walker  ___CPAP machine  ___Urine specimen  BRING DAY OF SURGERY

## 2025-05-30 NOTE — CPM/PAT H&P
"CPM/PAT Evaluation       Name: Elma Cleveland (Elma Cleveland)  /Age: 1985/39 y.o.     In-Person       Chief Complaint:     39 yr old female presents to [facility] for pre-operative evaluation, with c/o adenomyosis/ endometriosis  ROBOTIC ENDOMETRIOSIS EXCISION/ CHROMOPERTUBATION/ APPENDECTOMY/ POSSIBLE MIRENA AND ANY INDICATED PROCEDURE  is Scheduled on 2025  with Dr. Grider    The patient has the following past medical history:  Hashimoto, hypermobile, ADHD(unable to tolerate stimulants/ vibrance and adderall) , GERD, POTS, hyponatremia, asthma      Chief complaint:  She reports sudden onset worsening pelvic pain that started last year   She states that her \"periods changed and sex hurt beginning in 2024.\"  States she presented to her providers and started an autoimmune protocol  \"Periods getting worse every month\"  January woke up with severe pain--->no relief, presented to OB--ultrasound done and found to have adenomyosis    Currently, she states cycles are regular, lasting 4 days, heavy x 1-2 days  Associated sx- bloated, mood changes/PMDD, increasing headaches, fatigue     LMP- /--2025    She has tried Celebrex in the past and had break-thru pain-->taking NSAIDS  She has also tried birth control, patches    Imaging:   - Pelvic US 2025 showed uterus measuring 5cm x 4cm x 8cm. Heterogenous myometrium. Suspicious for adenomyosis.    PCP Dr. Fink, LOV 2024  Rheumatologist- Dr. Thakkar, 2025    Denies fever, chills or nausea.   Denies any past issues with anesthesia.        Vitals:    25 0944   BP: 127/82   Pulse: 97   Resp: 18   Temp: 36 °C (96.8 °F)   SpO2: 98%          Medical History[1]    Surgical History[2]    Patient  reports being sexually active and has had partner(s) who are male. She reports using the following method of birth control/protection: Coitus interruptus.    Family History[3]    Allergies[4]    Prior to Admission medications    Medication Sig " Start Date End Date Taking? Authorizing Provider   acyclovir (Zovirax) 5 % ointment 1 Application.    Historical Provider, MD   budesonide-formoterol (Symbicort) 160-4.5 mcg/actuation inhaler 2 inhalation 2 times daily Inhalation 4/10/25      busPIRone (Buspar) 5 mg tablet Take 1 tablet (5 mg) by mouth 2 times a day. 10/11/24 10/11/25  Nika Fink PA-C   certolizumab pegol (Cimzia) injection INJECT 2 SYRINGES (400MG) UNDER THE SKIN ONCE EVERY 4 WEEKS. 10/4/24 10/4/25  Genesis Bauer MD   cholecalciferol (Vitamin D-3) 50 MCG (2000 UT) tablet Take 1 tablet (2,000 Units) by mouth once daily.    Historical Provider, MD   cyclobenzaprine (Flexeril) 10 mg tablet Take 1 tablet (10 mg) by mouth once daily at bedtime. PRN 12/11/24   Genesis Bauer MD   drospirenone, contraceptive, (Slynd) 4 mg (28) tablet Take 1 tablet by mouth once daily. 2/24/25 2/24/26  Spencer Damian MD   famotidine (Pepcid) 40 mg tablet Take 1 tablet (40 mg) by mouth every 12 hours. 9/27/24      fexofenadine (Allegra Allergy) 180 mg tablet Take 2 tablets in the morning orally 9/27/24      ipratropium (Atrovent) 21 mcg (0.03 %) nasal spray Administer 2 sprays into each nostril every 12 hours. 11/22/24 11/22/25  Nika Fink PA-C   levalbuterol (Xopenex HFA) 45 mcg/actuation inhaler Inhale 2 puffs every 6 hours. 5/16/11   Historical Provider, MD   mometasone (Nasonex) 50 mcg/actuation nasal spray Administer into affected nostril(s). 9/30/19   Historical Provider, MD   naproxen (Naprosyn) 500 mg tablet Take 1 tablet (500 mg) by mouth 2 times a day as needed for mild pain (1 - 3). 4/11/25 12/7/25  Genesis Bauer MD   NP Thyroid 60 mg tablet TAKE 1 TABLET BY MOUTH ONCE DAILY. 4/16/25   Genesis Bauer MD   pantoprazole (Protonix) 40 mg EC tablet Take 1 tablet (40 mg) by mouth once daily in the morning. Take before meals. Do not crush, chew, or split. 4/11/25 4/11/26  Genesis Bauer MD      Review of  Systems  Constitutional: NO F, chills, or sweats  Eyes: no blurred vision or visual disturbance  ENT: denies congestion, sore throat, difficulty hearing  Cardiovascular: no chest pain, no edema, no palps and no syncope.   Respiratory: + cough since 11/2025, asthma, no s.o.b. and no wheezing  Gastrointestinal: GERD, H-pylori, no N/V, no blood in stools  Genitourinary: see HPI, no dysuria, no urinary frequency, no urinary hesitancy and no feelings of urinary urgency.   Musculoskeletal: no arthralgias,  no back pain and no myalgias.   Integumentary: hive breakouts, allergy to dust, adhesives, no new skin lesions and no rashes.   Neurological: right leg/foot weakness with trips after walking long periods, no difficulty walking, no headache, no numbness and no tingling.   Endocrine: no recent weight gain and no recent weight loss.   Hematologic/Lymphatic: no tendency for easy bruising and no swollen glands.      Physical Exam  Constitutional:       Appearance: Normal appearance.   Cardiovascular:      Rate and Rhythm: Normal rate.      Heart sounds: Normal heart sounds.   Pulmonary:      Effort: Pulmonary effort is normal.      Breath sounds: Normal breath sounds.   Abdominal:      General: Bowel sounds are normal.      Palpations: Abdomen is soft.   Musculoskeletal:         General: Normal range of motion.      Cervical back: Normal range of motion.      Right lower leg: No edema.      Left lower leg: No edema.   Skin:     General: Skin is warm and dry.   Neurological:      Mental Status: She is alert and oriented to person, place, and time.          PAT AIRWAY:   Airway:     Mallampati::  II    TM distance::  <3 FB    Neck ROM::  Full  normal        Visit Vitals  /82   Pulse 97   Temp 36 °C (96.8 °F) (Temporal)   Resp 18   Ht 1.524 m (5')   Wt 82.6 kg (182 lb 1.6 oz)   SpO2 98%   BMI 35.56 kg/m²   OB Status Having periods   Smoking Status Never   BSA 1.87 m²       DASI Risk Score      Flowsheet Row Pre-Admission  Testing from 5/30/2025 in [facility] Questionnaire Series Submission from 5/2/2025 in [facility] with Generic Provider [place]   Can you take care of yourself (eat, dress, bathe, or use toilet)?  2.75 filed at 05/30/2025 0932 2.75  filed at 05/02/2025 0901   Can you walk indoors, such as around your house? 1.75 filed at 05/30/2025 0932 1.75  filed at 05/02/2025 0901   Can you walk a block or two on level ground?  2.75 filed at 05/30/2025 0932 2.75  filed at 05/02/2025 0901   Can you climb a flight of stairs or walk up a hill? 5.5 filed at 05/30/2025 0932 5.5  filed at 05/02/2025 0901   Can you run a short distance? 8 filed at 05/30/2025 0932 8  filed at 05/02/2025 0901   Can you do light work around the house like dusting or washing dishes? 2.7 filed at 05/30/2025 0932 2.7  filed at 05/02/2025 0901   Can you do moderate work around the house like vacuuming, sweeping floors or carrying groceries? 3.5 filed at 05/30/2025 0932 3.5  filed at 05/02/2025 0901   Can you do heavy work around the house like scrubbing floors or lifting and moving heavy furniture?  8 filed at 05/30/2025 0932 8  filed at 05/02/2025 0901   Can you do yard work like raking leaves, weeding or pushing a mower? 4.5 filed at 05/30/2025 0932 4.5  filed at 05/02/2025 0901   Can you have sexual relations? 5.25 filed at 05/30/2025 0932 5.25  filed at 05/02/2025 0901   Can you participate in moderate recreational activities like golf, bowling, dancing, doubles tennis or throwing a baseball or football? 6 filed at 05/30/2025 0932 6  filed at 05/02/2025 0901   Can you participate in strenous sports like swimming, singles tennis, football, basketball, or skiing? 0 filed at 05/30/2025 0932 0  filed at 05/02/2025 0901   DASI SCORE 50.7 filed at 05/30/2025 0932 50.7  filed at 05/02/2025 0901   METS Score (Will be calculated only when all the questions are answered) 9 filed at 05/30/2025 0932 9  filed at 05/02/2025 0901          Caprini DVT  Assessment    No data to display       Modified Frailty Index    No data to display       [id number] Stroke Risk Points  Current as of just now        N/A 0 to 9 Points:      Last Change: N/A          The [id number] risk score (J Luis CARTER, et al. 2009. © 2010 American [employer]) quantifies the risk of stroke for a patient with atrial fibrillation. For patients without atrial fibrillation or under the age of 18 this score appears as N/A. Higher score values generally indicate higher risk of stroke.        This score is not applicable to this patient. Components are not calculated.          Revised Cardiac Risk Index    No data to display       Apfel Simplified Score    No data to display       Risk Analysis Index Results This Encounter    No data found in the last 10 encounters.       Stop Bang Score      Flowsheet Row Questionnaire Series Submission [facility] 5/2/[facility] in [facility] with Generic Provider Kenya   Do you snore loudly? 0  filed at 05/02/2025 0901   Do you often feel tired or fatigued after your sleep? 1  filed at 05/02/2025 0901   Has anyone ever observed you stop breathing in your sleep? 0  filed at 05/02/2025 0901   Do you have or are you being treated for high blood pressure? 0  filed at 05/02/2025 0901   Recent BMI (Calculated) 34  filed at 05/02/2025 0901   Is BMI greater than 35 kg/m2? 0=No  filed at 05/02/2025 0901   Age older than 50 years old? 0=No  filed at 05/02/2025 0901   Gender - Male 0=No  filed at 05/02/2025 0901          Prodigy: High Risk  Total Score: 0          ARISCAT Score for Postoperative Pulmonary Complications    No data to display       Sung Perioperative Risk for Myocardial Infarction or Cardiac Arrest (JENISE)    No data to display         ASSESSMENT  Obesity   BMI 33.98    Hashimoto  Takes NP Thyroid    ADD  Previously tried Vyvanse and Adderall-->unable to tolerate    GERD  Takes pantoprazole  Stable    POTS, hyponatremia  NA 3/21/2025 --> 138  ECG  12/10/2024- NSR, 85 bpm      Asthma / allergies  takes inhalers/ nebulizers, Certrizine, allegra as ordered  + dry cough, reports + intermittent hive breakout    Doug Danlos, autoimmune disorder, RA  Takes Cimzia  Follows Endocrinology    Endometriosis/ adenomyosis  Plan for robotic endometriosis excision as scheduled    ANESTHESIA FINDINGS:  Intubation History: No history of difficult intubation  Significant Anesthesia Considerations:      Airway History: No abnormal airway history  Predictors of Difficult Airway Management  ? Obesity    CONSULTS:    Patient does not require consults for optimization at this time.     The Following Tests/Procedures Have Been Initiated and Reviewed/ interpreted by me:   CBC, BMP reviewed from 3/21/2025   ECG reviewed from 12/2024     Planned Anesthetic: general     Instructions Given to Patient:  *Reviewed Medications to be taken in AM of surgery or held  Patient given verbal and written preop instructions and voices comprehension and compliance.     No further testing required.    PLAN  This patient is optimally prepared for surgery.             [1]   Past Medical History:  Diagnosis Date    Acute vaginitis 02/01/2020    Vaginitis and vulvovaginitis    ADHD (attention deficit hyperactivity disorder)     Adverse effect of anesthesia     Allergic     Anesthesia of skin 10/03/2017    Right arm numbness    Autoimmune disorder (Multi)     Carpal tunnel syndrome, left upper limb 12/20/2016    Left carpal tunnel syndrome    Concussion with loss of consciousness of unspecified duration, subsequent encounter     Concussion with loss of consciousness of unspecified duration, subsequent encounter    Diarrhea, unspecified 11/08/2018    Acute diarrhea    Disease of thyroid gland     Dry eye syndrome of bilateral lacrimal glands     Insufficiency of tear film of both eyes    Encounter for surveillance of contraceptive pills 04/25/2018    Encounter for birth control pills maintenance    GERD  (gastroesophageal reflux disease)     Hashimoto's disease     Hypo-osmolality and hyponatremia 04/05/2020    Hyponatremia    Immunization not carried out because of patient refusal 07/05/2019    Influenza vaccination declined    Left lower quadrant abdominal tenderness 08/22/2017    Abdominal left lower quadrant tenderness    Left lower quadrant pain 11/17/2016    Colicky LLQ abdominal pain    Nephrolithiasis     Other conditions influencing health status 08/22/2017    History of chronic diarrhea    Other intervertebral disc displacement, lumbar region     Herniated lumbar disc without myelopathy    Other malaise 09/10/2020    Malaise and fatigue    Other specified noninflammatory disorders of vagina     Vaginal lesion    Other symptoms and signs involving the musculoskeletal system 10/04/2018    Weakness of right foot    Other symptoms and signs involving the musculoskeletal system 09/28/2018    Weakness of right foot    Other symptoms and signs involving the musculoskeletal system 10/02/2018    Weakness of right lower extremity    Pain in right knee 04/25/2018    Right knee pain    Pain in unspecified foot 09/10/2020    Foot pain    Pelvic and perineal pain 06/05/2020    Pelvic pain    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    Personal history of diseases of the blood and blood-forming organs and certain disorders involving the immune mechanism     History of autoimmune disorder    Personal history of diseases of the skin and subcutaneous tissue 06/10/2016    History of allergic urticaria    Personal history of other benign neoplasm     History of adenoma of adrenal gland    Personal history of other diseases of the circulatory system 10/25/2016    History of irregular heartbeat    Personal history of other diseases of the digestive system 07/05/2019    History of left inguinal hernia    Personal history of other diseases of the digestive system 02/28/2018    History  of hemorrhoids    Personal history of other diseases of the female genital tract 05/22/2018    History of menorrhagia    Personal history of other diseases of the musculoskeletal system and connective tissue 09/02/2020    History of joint pain    Personal history of other diseases of the musculoskeletal system and connective tissue 09/10/2020    History of joint pain    Personal history of other diseases of the respiratory system 02/28/2018    History of acute sinusitis    Personal history of other diseases of the respiratory system     History of asthma    Personal history of other diseases of the respiratory system 12/21/2016    History of acute sinusitis    Personal history of other endocrine, nutritional and metabolic disease 04/06/2020    History of hyperkalemia    Personal history of other infectious and parasitic diseases     History of mononucleosis    Personal history of other specified conditions 07/05/2019    History of nausea    Personal history of other specified conditions 03/27/2019    History of palpitations    Personal history of other specified conditions 08/22/2017    History of urinary frequency    Personal history of other specified conditions 08/12/2021    History of dizziness    Personal history of other specified conditions 07/05/2019    History of headache    Personal history of other specified conditions 09/10/2020    History of numbness    Personal history of other specified conditions 12/30/2016    History of weight gain    Personal history of traumatic brain injury 06/04/2018    History of concussion    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)     PONV (postoperative nausea and vomiting)     Rash and other nonspecific skin eruption 06/10/2016    Rash of unknown cause    Rheumatoid arthritis     Right upper quadrant pain 12/03/2019    Right upper quadrant abdominal pain    Syncope     Syncope and collapse 02/28/2018    Vasovagal near syncope    Tremor, unspecified 12/19/2016     Tremor of right hand    Unspecified abdominal pain 12/12/2019    Abdominal pain of unknown etiology    Unspecified asthma with (acute) exacerbation ([facility]) 02/28/2018    Acute asthma exacerbation    Unspecified symptoms and signs involving the genitourinary system 12/05/2019    UTI symptoms    Urinary tract infection     Urogenital trichomoniasis     Vision loss    [2]   Past Surgical History:  Procedure Laterality Date    ADENOIDECTOMY  10/24/2016    Adenoidectomy    CHOLECYSTECTOMY      COLONOSCOPY      CYSTOSCOPY      GALLBLADDER SURGERY  06/10/2016    Gallbladder Surgery    HERNIA REPAIR  06/10/2016    Hernia Repair    KNEE SURGERY Left 06/10/2016    2001, 2007, 2008    LYMPH NODE BIOPSY      OTHER SURGICAL HISTORY  10/24/2016    Deep Cervical Lymph Node Biopsy    TONSILLECTOMY  10/24/2016    Tonsillectomy    UPPER GASTROINTESTINAL ENDOSCOPY     [3]   Family History  Problem Relation Name Age of Onset    Thyroid disease Mother Blessing     Asthma Mother Blessing     Lupus Mother Blessing     Cancer Mother Blessing     Depression Mother Blessing     Hyperlipidemia Mother Blessing     Hypertension Mother Blessing     Arthritis Mother Blessing     Hypertension Father Linsey     Gout Father Linsey     Hyperlipidemia Father Linsey     No Known Problems Brother      Breast cancer Father's Sister Norina    [4]   Allergies  Allergen Reactions    Ciprofloxacin Other     Reaction: Vomiting    Coconut Milk Anaphylaxis    Codeine Sulfate Other     NIGHTMARES AND SLEEPWALKING    Hydroxychloroquine Rash     Full body rash    Oxycodone-Acetaminophen Itching     full body itching    Tuberculin Ppd Unknown and Rash     ALLERGY TO BOTH APLISOL AND TUBERSOL CAUSING REDNESS, RASH AND IRRITATION UNDER SKIN.  ANNUAL SCREENING IS A TB GOLD.    Codeine Hives    Gum Czkiaa-Pwpvcz-Ttkj-Alcohol Unknown    Sodium Lauroylsarcosinate Unknown    Sodium Lauryl Sarcosinate Unknown    Adhesive Tape-Silicones Rash    Blue Dye Hives and Rash    Dibucaine Hives and Rash      Other Reaction(s): Unknown    Gold Keratinate Hives and Rash    Gold Sodium Thiosulfate Rash    Lidocaine Hives and Rash    Linalool Hives and Rash    Nickel Hives and Rash    Sodium Laureth Sulfate Rash, Unknown and Hives    Sodium Thiosulfate Rash    Thimerosal Hives and Rash    Wound Dressings Hives and Rash     Other Reaction(s): hives    Yellow Dye Hives and Rash

## 2025-06-04 ENCOUNTER — APPOINTMENT (OUTPATIENT)
Dept: PHYSICAL THERAPY | Facility: CLINIC | Age: 40
End: 2025-06-04
Payer: COMMERCIAL

## 2025-06-10 ENCOUNTER — SPECIALTY PHARMACY (OUTPATIENT)
Dept: PHARMACY | Facility: CLINIC | Age: 40
End: 2025-06-10

## 2025-06-10 PROCEDURE — RXMED WILLOW AMBULATORY MEDICATION CHARGE

## 2025-06-12 ENCOUNTER — ANESTHESIA EVENT (OUTPATIENT)
Dept: OPERATING ROOM | Facility: HOSPITAL | Age: 40
End: 2025-06-12
Payer: COMMERCIAL

## 2025-06-12 NOTE — HOSPITAL COURSE
Gynecologic Surgery History and Physical    Subjective   Elma Cleveland is a 40 y.o. G0 with hx of pelvic pain, possibly 2/2 adenomyosis or endometriosis, presenting for robotic endometriosis excision, chromopertubation, appendectomy, possible Mirena placement and any indicated procedures.    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 Pylori (treated), POTS, ?MCAD, Autosomal Dominant familial mediterranean fever    PSH:   laparoscopic cholecystectomy, tonsillectomy/adenoidectomy, bilateral inguinal hernia repair, knee surgery x 3, cystoscopy for stone     Imaging:   - Pelvic US 2025 showed uterus measuring 5cm x 4cm x 8cm. Heterogenous myometrium. Suspicious for adenomyosis.   -Pelvic MRI 2025 showed no adnexal masses or measurable endometrial implants, no definite adenomyosis to correlate with the prior ultrasound, left corpus luteal cyst measuring 1.9 cm    Obstetrical History   OB History    Para Term  AB Living   0 0 0 0 0 0   SAB IAB Ectopic Multiple Live Births   0 0 0 0 0       Past Medical History  Past Medical History:   Diagnosis Date    Acute vaginitis 2020    Vaginitis and vulvovaginitis    ADHD (attention deficit hyperactivity disorder)     Adverse effect of anesthesia     Allergic     Anesthesia of skin 10/03/2017    Right arm numbness    Autoimmune disorder (Multi)     Carpal tunnel syndrome, left upper limb 2016    Left carpal tunnel syndrome    Concussion with loss of consciousness of unspecified duration, subsequent encounter     Concussion with loss of consciousness of unspecified duration, subsequent encounter    Diarrhea, unspecified 2018    Acute diarrhea    Disease of thyroid gland     Dry eye syndrome of bilateral lacrimal glands     Insufficiency of tear film of both eyes    Encounter for surveillance of contraceptive pills 2018    Encounter for birth control pills  maintenance    GERD (gastroesophageal reflux disease)     Hashimoto's disease     Hypo-osmolality and hyponatremia 04/05/2020    Hyponatremia    Immunization not carried out because of patient refusal 07/05/2019    Influenza vaccination declined    Left lower quadrant abdominal tenderness 08/22/2017    Abdominal left lower quadrant tenderness    Left lower quadrant pain 11/17/2016    Colicky LLQ abdominal pain    Nephrolithiasis     Other conditions influencing health status 08/22/2017    History of chronic diarrhea    Other intervertebral disc displacement, lumbar region     Herniated lumbar disc without myelopathy    Other malaise 09/10/2020    Malaise and fatigue    Other specified noninflammatory disorders of vagina     Vaginal lesion    Other symptoms and signs involving the musculoskeletal system 10/04/2018    Weakness of right foot    Other symptoms and signs involving the musculoskeletal system 09/28/2018    Weakness of right foot    Other symptoms and signs involving the musculoskeletal system 10/02/2018    Weakness of right lower extremity    Pain in right knee 04/25/2018    Right knee pain    Pain in unspecified foot 09/10/2020    Foot pain    Pelvic and perineal pain 06/05/2020    Pelvic pain    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    Personal history of diseases of the blood and blood-forming organs and certain disorders involving the immune mechanism     History of autoimmune disorder    Personal history of diseases of the skin and subcutaneous tissue 06/10/2016    History of allergic urticaria    Personal history of other benign neoplasm     History of adenoma of adrenal gland    Personal history of other diseases of the circulatory system 10/25/2016    History of irregular heartbeat    Personal history of other diseases of the digestive system 07/05/2019    History of left inguinal hernia    Personal history of other diseases of the digestive system  02/28/2018    History of hemorrhoids    Personal history of other diseases of the female genital tract 05/22/2018    History of menorrhagia    Personal history of other diseases of the musculoskeletal system and connective tissue 09/02/2020    History of joint pain    Personal history of other diseases of the musculoskeletal system and connective tissue 09/10/2020    History of joint pain    Personal history of other diseases of the respiratory system 02/28/2018    History of acute sinusitis    Personal history of other diseases of the respiratory system     History of asthma    Personal history of other diseases of the respiratory system 12/21/2016    History of acute sinusitis    Personal history of other endocrine, nutritional and metabolic disease 04/06/2020    History of hyperkalemia    Personal history of other infectious and parasitic diseases     History of mononucleosis    Personal history of other specified conditions 07/05/2019    History of nausea    Personal history of other specified conditions 03/27/2019    History of palpitations    Personal history of other specified conditions 08/22/2017    History of urinary frequency    Personal history of other specified conditions 08/12/2021    History of dizziness    Personal history of other specified conditions 07/05/2019    History of headache    Personal history of other specified conditions 09/10/2020    History of numbness    Personal history of other specified conditions 12/30/2016    History of weight gain    Personal history of traumatic brain injury 06/04/2018    History of concussion    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)     PONV (postoperative nausea and vomiting)     Rash and other nonspecific skin eruption 06/10/2016    Rash of unknown cause    Rheumatoid arthritis     Right upper quadrant pain 12/03/2019    Right upper quadrant abdominal pain    Syncope     Syncope and collapse 02/28/2018    Vasovagal near syncope    Tremor,  unspecified 12/19/2016    Tremor of right hand    Unspecified abdominal pain 12/12/2019    Abdominal pain of unknown etiology    Unspecified asthma with (acute) exacerbation ([facility]) 02/28/2018    Acute asthma exacerbation    Unspecified symptoms and signs involving the genitourinary system 12/05/2019    UTI symptoms    Urinary tract infection     Urogenital trichomoniasis     Vision loss         Past Surgical History   Past Surgical History:   Procedure Laterality Date    ADENOIDECTOMY  10/24/2016    Adenoidectomy    CHOLECYSTECTOMY      COLONOSCOPY      CYSTOSCOPY      GALLBLADDER SURGERY  06/10/2016    Gallbladder Surgery    HERNIA REPAIR  06/10/2016    Hernia Repair    KNEE SURGERY Left 06/10/2016    2001, 2007, 2008    LYMPH NODE BIOPSY      OTHER SURGICAL HISTORY  10/24/2016    Deep Cervical Lymph Node Biopsy    TONSILLECTOMY  10/24/2016    Tonsillectomy    UPPER GASTROINTESTINAL ENDOSCOPY         Social History  Social History     Tobacco Use    Smoking status: Never    Smokeless tobacco: Never   Substance Use Topics    Alcohol use: Yes     Comment: 2 drinks per month maybe     Substance and Sexual Activity   Drug Use Never       Allergies  Ciprofloxacin, Coconut milk, Codeine sulfate, Hydroxychloroquine, Oxycodone-acetaminophen, Tuberculin ppd, Codeine, Gum acsjtm-dxeycr-hhmt-alcohol, Sodium lauroylsarcosinate, Sodium lauryl sarcosinate, Adhesive tape-silicones, Blue dye, Dibucaine, Gold keratinate, Gold sodium thiosulfate, Lidocaine, Linalool, Nickel, Sodium laureth sulfate, Sodium thiosulfate, Thimerosal, Wound dressings, and Yellow dye     Medications  No medications prior to admission.       ROS: negative except per HPI    Objective    Last Vitals  There were no vitals taken for this visit.    Physical Examination  General: no acute distress  HEENT: normocephalic, atraumatic  Heart: warm and well perfused  Lungs: breathing comfortably on room air  Abdomen: nondistended  Extremities: moving all  extremities  Neuro: awake and conversant  Psych: appropriate mood and affect    Lab Review          Lab Results   Component Value Date    WBC 5.1 03/21/2025    HGB 14.6 03/21/2025    HCT 44.6 03/21/2025    MCV 92.5 03/21/2025     03/21/2025       Lab Results   Component Value Date    GLUCOSE 90 03/21/2025    CALCIUM 9.3 03/21/2025     03/21/2025    K 4.2 03/21/2025    CO2 26 03/21/2025     03/21/2025    BUN 18 03/21/2025    CREATININE 0.78 03/21/2025       Assessment/Plan     Elma Cleveland is a 40 y.o. with hx of pelvic pain, possibly 2/2 adenomyosis or endometriosis presenting for scheduled surgery.    Plan to proceed with robotic endometriosis excision, chromopertubation, appendectomy, possible Mirena placement and any indicated procedures.  Surgical consent was reviewed. The risks of surgery were discussed including: bleeding (including need for blood transfusion in life-threatening situations; risks of transfusion), infection, damage to surrounding organs. The patient had the opportunity to answer questions and desired to proceed with surgery following our discussion. Both verbal and written consents were obtained.    Pt and plan discussed with Dr. Marni Harris, M4

## 2025-06-13 ENCOUNTER — ANESTHESIA (OUTPATIENT)
Dept: OPERATING ROOM | Facility: HOSPITAL | Age: 40
End: 2025-06-13
Payer: COMMERCIAL

## 2025-06-13 ENCOUNTER — HOSPITAL ENCOUNTER (OUTPATIENT)
Facility: HOSPITAL | Age: 40
Setting detail: OUTPATIENT SURGERY
Discharge: HOME | End: 2025-06-13
Attending: STUDENT IN AN ORGANIZED HEALTH CARE EDUCATION/TRAINING PROGRAM | Admitting: STUDENT IN AN ORGANIZED HEALTH CARE EDUCATION/TRAINING PROGRAM
Payer: COMMERCIAL

## 2025-06-13 VITALS
SYSTOLIC BLOOD PRESSURE: 100 MMHG | TEMPERATURE: 97.2 F | RESPIRATION RATE: 18 BRPM | BODY MASS INDEX: 34.36 KG/M2 | HEIGHT: 60 IN | WEIGHT: 175 LBS | OXYGEN SATURATION: 98 % | DIASTOLIC BLOOD PRESSURE: 58 MMHG | HEART RATE: 102 BPM

## 2025-06-13 DIAGNOSIS — R10.2 PELVIC PAIN: ICD-10-CM

## 2025-06-13 DIAGNOSIS — Z98.890 POST-OPERATIVE STATE: ICD-10-CM

## 2025-06-13 DIAGNOSIS — N80.03 ADENOMYOSIS: Primary | ICD-10-CM

## 2025-06-13 DIAGNOSIS — N80.9 ENDOMETRIOSIS: ICD-10-CM

## 2025-06-13 LAB — PREGNANCY TEST URINE, POC: NEGATIVE

## 2025-06-13 PROCEDURE — 7100000010 HC PHASE TWO TIME - EACH INCREMENTAL 1 MINUTE: Performed by: STUDENT IN AN ORGANIZED HEALTH CARE EDUCATION/TRAINING PROGRAM

## 2025-06-13 PROCEDURE — 2500000004 HC RX 250 GENERAL PHARMACY W/ HCPCS (ALT 636 FOR OP/ED)

## 2025-06-13 PROCEDURE — 2500000005 HC RX 250 GENERAL PHARMACY W/O HCPCS: Performed by: STUDENT IN AN ORGANIZED HEALTH CARE EDUCATION/TRAINING PROGRAM

## 2025-06-13 PROCEDURE — 7100000009 HC PHASE TWO TIME - INITIAL BASE CHARGE: Performed by: STUDENT IN AN ORGANIZED HEALTH CARE EDUCATION/TRAINING PROGRAM

## 2025-06-13 PROCEDURE — 2500000004 HC RX 250 GENERAL PHARMACY W/ HCPCS (ALT 636 FOR OP/ED): Performed by: STUDENT IN AN ORGANIZED HEALTH CARE EDUCATION/TRAINING PROGRAM

## 2025-06-13 PROCEDURE — 2500000001 HC RX 250 WO HCPCS SELF ADMINISTERED DRUGS (ALT 637 FOR MEDICARE OP): Performed by: ANESTHESIOLOGY

## 2025-06-13 PROCEDURE — 2720000007 HC OR 272 NO HCPCS: Performed by: STUDENT IN AN ORGANIZED HEALTH CARE EDUCATION/TRAINING PROGRAM

## 2025-06-13 PROCEDURE — S2900 ROBOTIC SURGICAL SYSTEM: HCPCS | Performed by: STUDENT IN AN ORGANIZED HEALTH CARE EDUCATION/TRAINING PROGRAM

## 2025-06-13 PROCEDURE — 3700000002 HC GENERAL ANESTHESIA TIME - EACH INCREMENTAL 1 MINUTE: Performed by: STUDENT IN AN ORGANIZED HEALTH CARE EDUCATION/TRAINING PROGRAM

## 2025-06-13 PROCEDURE — 2500000004 HC RX 250 GENERAL PHARMACY W/ HCPCS (ALT 636 FOR OP/ED): Performed by: ANESTHESIOLOGY

## 2025-06-13 PROCEDURE — 58662 LAPAROSCOPY EXCISE LESIONS: CPT | Performed by: STUDENT IN AN ORGANIZED HEALTH CARE EDUCATION/TRAINING PROGRAM

## 2025-06-13 PROCEDURE — 88307 TISSUE EXAM BY PATHOLOGIST: CPT | Performed by: PATHOLOGY

## 2025-06-13 PROCEDURE — 88307 TISSUE EXAM BY PATHOLOGIST: CPT | Mod: TC,ELYLAB,PARLAB | Performed by: STUDENT IN AN ORGANIZED HEALTH CARE EDUCATION/TRAINING PROGRAM

## 2025-06-13 PROCEDURE — 2500000002 HC RX 250 W HCPCS SELF ADMINISTERED DRUGS (ALT 637 FOR MEDICARE OP, ALT 636 FOR OP/ED)

## 2025-06-13 PROCEDURE — 3600000018 HC OR TIME - INITIAL BASE CHARGE - PROCEDURE LEVEL SIX: Performed by: STUDENT IN AN ORGANIZED HEALTH CARE EDUCATION/TRAINING PROGRAM

## 2025-06-13 PROCEDURE — 2500000001 HC RX 250 WO HCPCS SELF ADMINISTERED DRUGS (ALT 637 FOR MEDICARE OP): Performed by: STUDENT IN AN ORGANIZED HEALTH CARE EDUCATION/TRAINING PROGRAM

## 2025-06-13 PROCEDURE — 3700000001 HC GENERAL ANESTHESIA TIME - INITIAL BASE CHARGE: Performed by: STUDENT IN AN ORGANIZED HEALTH CARE EDUCATION/TRAINING PROGRAM

## 2025-06-13 PROCEDURE — 3600000017 HC OR TIME - EACH INCREMENTAL 1 MINUTE - PROCEDURE LEVEL SIX: Performed by: STUDENT IN AN ORGANIZED HEALTH CARE EDUCATION/TRAINING PROGRAM

## 2025-06-13 PROCEDURE — 2500000004 HC RX 250 GENERAL PHARMACY W/ HCPCS (ALT 636 FOR OP/ED): Performed by: ANESTHESIOLOGIST ASSISTANT

## 2025-06-13 PROCEDURE — 7100000002 HC RECOVERY ROOM TIME - EACH INCREMENTAL 1 MINUTE: Performed by: STUDENT IN AN ORGANIZED HEALTH CARE EDUCATION/TRAINING PROGRAM

## 2025-06-13 PROCEDURE — 7100000001 HC RECOVERY ROOM TIME - INITIAL BASE CHARGE: Performed by: STUDENT IN AN ORGANIZED HEALTH CARE EDUCATION/TRAINING PROGRAM

## 2025-06-13 PROCEDURE — 81025 URINE PREGNANCY TEST: CPT | Performed by: STUDENT IN AN ORGANIZED HEALTH CARE EDUCATION/TRAINING PROGRAM

## 2025-06-13 RX ORDER — MEPERIDINE HYDROCHLORIDE 50 MG/ML
12.5 INJECTION INTRAMUSCULAR; INTRAVENOUS; SUBCUTANEOUS EVERY 10 MIN PRN
Status: DISCONTINUED | OUTPATIENT
Start: 2025-06-13 | End: 2025-06-13 | Stop reason: HOSPADM

## 2025-06-13 RX ORDER — HYDRALAZINE HYDROCHLORIDE 20 MG/ML
5 INJECTION INTRAMUSCULAR; INTRAVENOUS EVERY 30 MIN PRN
Status: DISCONTINUED | OUTPATIENT
Start: 2025-06-13 | End: 2025-06-13 | Stop reason: HOSPADM

## 2025-06-13 RX ORDER — CELECOXIB 100 MG/1
400 CAPSULE ORAL ONCE
Status: COMPLETED | OUTPATIENT
Start: 2025-06-13 | End: 2025-06-13

## 2025-06-13 RX ORDER — METRONIDAZOLE 500 MG/100ML
500 INJECTION, SOLUTION INTRAVENOUS ONCE
Status: DISCONTINUED | OUTPATIENT
Start: 2025-06-13 | End: 2025-06-13 | Stop reason: HOSPADM

## 2025-06-13 RX ORDER — CEFAZOLIN SODIUM 2 G/50ML
2 SOLUTION INTRAVENOUS ONCE
Status: DISCONTINUED | OUTPATIENT
Start: 2025-06-13 | End: 2025-06-13 | Stop reason: HOSPADM

## 2025-06-13 RX ORDER — APREPITANT 40 MG/1
CAPSULE ORAL
Status: COMPLETED
Start: 2025-06-13 | End: 2025-06-13

## 2025-06-13 RX ORDER — SODIUM CHLORIDE 0.9 G/100ML
INJECTION, SOLUTION IRRIGATION AS NEEDED
Status: DISCONTINUED | OUTPATIENT
Start: 2025-06-13 | End: 2025-06-13 | Stop reason: HOSPADM

## 2025-06-13 RX ORDER — LABETALOL HYDROCHLORIDE 5 MG/ML
5 INJECTION, SOLUTION INTRAVENOUS ONCE AS NEEDED
Status: DISCONTINUED | OUTPATIENT
Start: 2025-06-13 | End: 2025-06-13 | Stop reason: HOSPADM

## 2025-06-13 RX ORDER — SCOPOLAMINE 1 MG/3D
PATCH, EXTENDED RELEASE TRANSDERMAL
Status: DISCONTINUED
Start: 2025-06-13 | End: 2025-06-13 | Stop reason: HOSPADM

## 2025-06-13 RX ORDER — DEXMEDETOMIDINE HYDROCHLORIDE 100 UG/ML
INJECTION, SOLUTION INTRAVENOUS AS NEEDED
Status: DISCONTINUED | OUTPATIENT
Start: 2025-06-13 | End: 2025-06-13

## 2025-06-13 RX ORDER — BUPIVACAINE HYDROCHLORIDE 5 MG/ML
INJECTION, SOLUTION PERINEURAL AS NEEDED
Status: DISCONTINUED | OUTPATIENT
Start: 2025-06-13 | End: 2025-06-13 | Stop reason: HOSPADM

## 2025-06-13 RX ORDER — ONDANSETRON 4 MG/1
4 TABLET, FILM COATED ORAL EVERY 6 HOURS PRN
Qty: 20 TABLET | Refills: 0 | Status: SHIPPED | OUTPATIENT
Start: 2025-06-13 | End: 2025-06-20

## 2025-06-13 RX ORDER — MIDAZOLAM HYDROCHLORIDE 1 MG/ML
INJECTION, SOLUTION INTRAMUSCULAR; INTRAVENOUS AS NEEDED
Status: DISCONTINUED | OUTPATIENT
Start: 2025-06-13 | End: 2025-06-13

## 2025-06-13 RX ORDER — APREPITANT 40 MG/1
40 CAPSULE ORAL ONCE
Status: COMPLETED | OUTPATIENT
Start: 2025-06-13 | End: 2025-06-13

## 2025-06-13 RX ORDER — SODIUM CHLORIDE, SODIUM LACTATE, POTASSIUM CHLORIDE, CALCIUM CHLORIDE 600; 310; 30; 20 MG/100ML; MG/100ML; MG/100ML; MG/100ML
100 INJECTION, SOLUTION INTRAVENOUS CONTINUOUS
Status: ACTIVE | OUTPATIENT
Start: 2025-06-13 | End: 2025-06-13

## 2025-06-13 RX ORDER — SCOPOLAMINE 1 MG/3D
1 PATCH, EXTENDED RELEASE TRANSDERMAL
Status: DISCONTINUED | OUTPATIENT
Start: 2025-06-13 | End: 2025-06-13 | Stop reason: HOSPADM

## 2025-06-13 RX ORDER — FENTANYL CITRATE 50 UG/ML
INJECTION, SOLUTION INTRAMUSCULAR; INTRAVENOUS AS NEEDED
Status: DISCONTINUED | OUTPATIENT
Start: 2025-06-13 | End: 2025-06-13

## 2025-06-13 RX ORDER — ONDANSETRON HYDROCHLORIDE 2 MG/ML
4 INJECTION, SOLUTION INTRAVENOUS ONCE AS NEEDED
Status: DISCONTINUED | OUTPATIENT
Start: 2025-06-13 | End: 2025-06-13 | Stop reason: HOSPADM

## 2025-06-13 RX ORDER — ONDANSETRON HYDROCHLORIDE 2 MG/ML
INJECTION, SOLUTION INTRAVENOUS AS NEEDED
Status: DISCONTINUED | OUTPATIENT
Start: 2025-06-13 | End: 2025-06-13

## 2025-06-13 RX ORDER — WATER 1 ML/ML
INJECTION IRRIGATION AS NEEDED
Status: DISCONTINUED | OUTPATIENT
Start: 2025-06-13 | End: 2025-06-13 | Stop reason: HOSPADM

## 2025-06-13 RX ORDER — ACETAMINOPHEN 325 MG/1
650 TABLET ORAL EVERY 6 HOURS PRN
Qty: 20 TABLET | Refills: 0 | Status: SHIPPED | OUTPATIENT
Start: 2025-06-13 | End: 2025-06-23

## 2025-06-13 RX ORDER — OXYCODONE HYDROCHLORIDE 5 MG/1
5 TABLET ORAL EVERY 6 HOURS PRN
Qty: 12 TABLET | Refills: 0 | Status: SHIPPED | OUTPATIENT
Start: 2025-06-13 | End: 2025-06-20

## 2025-06-13 RX ORDER — IBUPROFEN 600 MG/1
600 TABLET, FILM COATED ORAL EVERY 6 HOURS PRN
Qty: 20 TABLET | Refills: 0 | Status: SHIPPED | OUTPATIENT
Start: 2025-06-13 | End: 2025-06-23

## 2025-06-13 RX ORDER — FENTANYL CITRATE 50 UG/ML
50 INJECTION, SOLUTION INTRAMUSCULAR; INTRAVENOUS EVERY 5 MIN PRN
Status: DISCONTINUED | OUTPATIENT
Start: 2025-06-13 | End: 2025-06-13 | Stop reason: HOSPADM

## 2025-06-13 RX ORDER — GLYCOPYRROLATE 0.2 MG/ML
INJECTION INTRAMUSCULAR; INTRAVENOUS AS NEEDED
Status: DISCONTINUED | OUTPATIENT
Start: 2025-06-13 | End: 2025-06-13

## 2025-06-13 RX ORDER — FENTANYL CITRATE 50 UG/ML
25 INJECTION, SOLUTION INTRAMUSCULAR; INTRAVENOUS EVERY 5 MIN PRN
Status: DISCONTINUED | OUTPATIENT
Start: 2025-06-13 | End: 2025-06-13 | Stop reason: HOSPADM

## 2025-06-13 RX ORDER — ACETAMINOPHEN 325 MG/1
975 TABLET ORAL ONCE
Status: COMPLETED | OUTPATIENT
Start: 2025-06-13 | End: 2025-06-13

## 2025-06-13 RX ORDER — ACETAMINOPHEN 325 MG/1
650 TABLET ORAL EVERY 4 HOURS PRN
Status: DISCONTINUED | OUTPATIENT
Start: 2025-06-13 | End: 2025-06-13 | Stop reason: HOSPADM

## 2025-06-13 RX ORDER — OXYCODONE HYDROCHLORIDE 5 MG/1
5 TABLET ORAL ONCE
Refills: 0 | Status: COMPLETED | OUTPATIENT
Start: 2025-06-13 | End: 2025-06-13

## 2025-06-13 RX ORDER — LIDOCAINE HYDROCHLORIDE 20 MG/ML
INJECTION, SOLUTION EPIDURAL; INFILTRATION; INTRACAUDAL; PERINEURAL AS NEEDED
Status: DISCONTINUED | OUTPATIENT
Start: 2025-06-13 | End: 2025-06-13

## 2025-06-13 RX ORDER — ROCURONIUM BROMIDE 10 MG/ML
INJECTION, SOLUTION INTRAVENOUS AS NEEDED
Status: DISCONTINUED | OUTPATIENT
Start: 2025-06-13 | End: 2025-06-13

## 2025-06-13 RX ORDER — GABAPENTIN 300 MG/1
600 CAPSULE ORAL ONCE
Status: COMPLETED | OUTPATIENT
Start: 2025-06-13 | End: 2025-06-13

## 2025-06-13 RX ORDER — MIDAZOLAM HYDROCHLORIDE 1 MG/ML
1 INJECTION, SOLUTION INTRAMUSCULAR; INTRAVENOUS ONCE AS NEEDED
Status: DISCONTINUED | OUTPATIENT
Start: 2025-06-13 | End: 2025-06-13 | Stop reason: HOSPADM

## 2025-06-13 RX ORDER — ALBUTEROL SULFATE 0.83 MG/ML
2.5 SOLUTION RESPIRATORY (INHALATION) ONCE AS NEEDED
Status: DISCONTINUED | OUTPATIENT
Start: 2025-06-13 | End: 2025-06-13 | Stop reason: HOSPADM

## 2025-06-13 RX ORDER — PROPOFOL 10 MG/ML
INJECTION, EMULSION INTRAVENOUS AS NEEDED
Status: DISCONTINUED | OUTPATIENT
Start: 2025-06-13 | End: 2025-06-13

## 2025-06-13 RX ORDER — AMOXICILLIN 250 MG
1 CAPSULE ORAL 2 TIMES DAILY
Qty: 30 TABLET | Refills: 0 | Status: SHIPPED | OUTPATIENT
Start: 2025-06-13

## 2025-06-13 RX ORDER — KETOROLAC TROMETHAMINE 30 MG/ML
INJECTION, SOLUTION INTRAMUSCULAR; INTRAVENOUS AS NEEDED
Status: DISCONTINUED | OUTPATIENT
Start: 2025-06-13 | End: 2025-06-13

## 2025-06-13 RX ADMIN — SCOPOLAMINE 1 PATCH: 1 PATCH, EXTENDED RELEASE TRANSDERMAL at 07:12

## 2025-06-13 RX ADMIN — MIDAZOLAM 1 MG: 1 INJECTION INTRAMUSCULAR; INTRAVENOUS at 07:42

## 2025-06-13 RX ADMIN — ONDANSETRON 4 MG: 2 INJECTION, SOLUTION INTRAMUSCULAR; INTRAVENOUS at 09:26

## 2025-06-13 RX ADMIN — PROPOFOL 200 MG: 10 INJECTION, EMULSION INTRAVENOUS at 07:47

## 2025-06-13 RX ADMIN — CELECOXIB 400 MG: 100 CAPSULE ORAL at 07:07

## 2025-06-13 RX ADMIN — FENTANYL CITRATE 50 MCG: 50 INJECTION INTRAMUSCULAR; INTRAVENOUS at 10:40

## 2025-06-13 RX ADMIN — FENTANYL CITRATE 50 MCG: 50 INJECTION INTRAMUSCULAR; INTRAVENOUS at 10:05

## 2025-06-13 RX ADMIN — HYDROMORPHONE HYDROCHLORIDE 0.2 MG: 2 INJECTION, SOLUTION INTRAMUSCULAR; INTRAVENOUS; SUBCUTANEOUS at 08:52

## 2025-06-13 RX ADMIN — ROCURONIUM BROMIDE 20 MG: 50 INJECTION, SOLUTION INTRAVENOUS at 08:52

## 2025-06-13 RX ADMIN — DEXMEDETOMIDINE HYDROCHLORIDE 12 MCG: 100 INJECTION, SOLUTION, CONCENTRATE INTRAVENOUS at 07:42

## 2025-06-13 RX ADMIN — ACETAMINOPHEN 975 MG: 325 TABLET ORAL at 07:07

## 2025-06-13 RX ADMIN — FENTANYL CITRATE 50 MCG: 50 INJECTION, SOLUTION INTRAMUSCULAR; INTRAVENOUS at 07:46

## 2025-06-13 RX ADMIN — PROPOFOL 120 MCG/KG/MIN: 10 INJECTION, EMULSION INTRAVENOUS at 07:46

## 2025-06-13 RX ADMIN — LIDOCAINE HYDROCHLORIDE 100 MG: 20 INJECTION, SOLUTION EPIDURAL; INFILTRATION; INTRACAUDAL; PERINEURAL at 07:46

## 2025-06-13 RX ADMIN — APREPITANT 40 MG: 40 CAPSULE ORAL at 07:11

## 2025-06-13 RX ADMIN — ROCURONIUM BROMIDE 20 MG: 50 INJECTION, SOLUTION INTRAVENOUS at 08:06

## 2025-06-13 RX ADMIN — GABAPENTIN 600 MG: 300 CAPSULE ORAL at 07:07

## 2025-06-13 RX ADMIN — ONDANSETRON 4 MG: 2 INJECTION, SOLUTION INTRAMUSCULAR; INTRAVENOUS at 07:42

## 2025-06-13 RX ADMIN — ROCURONIUM BROMIDE 50 MG: 50 INJECTION, SOLUTION INTRAVENOUS at 07:47

## 2025-06-13 RX ADMIN — GLYCOPYRROLATE 0.1 MG: 0.2 INJECTION, SOLUTION INTRAMUSCULAR; INTRAVENOUS at 07:42

## 2025-06-13 RX ADMIN — OXYCODONE HYDROCHLORIDE 5 MG: 5 TABLET ORAL at 12:27

## 2025-06-13 RX ADMIN — SODIUM CHLORIDE, SODIUM LACTATE, POTASSIUM CHLORIDE, AND CALCIUM CHLORIDE: .6; .31; .03; .02 INJECTION, SOLUTION INTRAVENOUS at 07:39

## 2025-06-13 RX ADMIN — FENTANYL CITRATE 25 MCG: 50 INJECTION, SOLUTION INTRAMUSCULAR; INTRAVENOUS at 08:17

## 2025-06-13 RX ADMIN — SCOPOLAMINE 1 PATCH: 1.5 PATCH, EXTENDED RELEASE TRANSDERMAL at 07:12

## 2025-06-13 RX ADMIN — HYDROMORPHONE HYDROCHLORIDE 0.2 MG: 2 INJECTION, SOLUTION INTRAMUSCULAR; INTRAVENOUS; SUBCUTANEOUS at 09:22

## 2025-06-13 RX ADMIN — SUGAMMADEX 200 MG: 100 INJECTION, SOLUTION INTRAVENOUS at 09:35

## 2025-06-13 RX ADMIN — DEXAMETHASONE SODIUM PHOSPHATE 8 MG: 4 INJECTION, SOLUTION INTRAMUSCULAR; INTRAVENOUS at 07:49

## 2025-06-13 RX ADMIN — FENTANYL CITRATE 25 MCG: 50 INJECTION, SOLUTION INTRAMUSCULAR; INTRAVENOUS at 09:10

## 2025-06-13 RX ADMIN — KETOROLAC TROMETHAMINE 30 MG: 30 INJECTION, SOLUTION INTRAMUSCULAR; INTRAVENOUS at 09:26

## 2025-06-13 SDOH — HEALTH STABILITY: MENTAL HEALTH: CURRENT SMOKER: 0

## 2025-06-13 ASSESSMENT — PAIN - FUNCTIONAL ASSESSMENT
PAIN_FUNCTIONAL_ASSESSMENT: 0-10
PAIN_FUNCTIONAL_ASSESSMENT: UNABLE TO SELF-REPORT
PAIN_FUNCTIONAL_ASSESSMENT: 0-10

## 2025-06-13 ASSESSMENT — PAIN SCALES - GENERAL
PAINLEVEL_OUTOF10: 4
PAINLEVEL_OUTOF10: 5 - MODERATE PAIN
PAIN_LEVEL: 1
PAINLEVEL_OUTOF10: 0 - NO PAIN
PAINLEVEL_OUTOF10: 9
PAINLEVEL_OUTOF10: 7

## 2025-06-13 ASSESSMENT — PAIN DESCRIPTION - LOCATION
LOCATION: ABDOMEN

## 2025-06-13 NOTE — ANESTHESIA PROCEDURE NOTES
Airway  Date/Time: 6/13/2025 7:49 AM  Reason: elective    Airway not difficult    Staffing  Performed: ZION   Authorized by: Steve Ferrell MD    Performed by: ZION Llamas  Patient location during procedure: OR    Patient Condition  Indications for airway management: anesthesia and airway protection  Patient position: sniffing  MILS maintained throughout  Sedation level: deep     Final Airway Details   Preoxygenated: yes  Final airway type: endotracheal airway  Successful airway: ETT  Cuffed: yes   Successful intubation technique: direct laryngoscopy  Adjuncts used in placement: intubating stylet  Endotracheal tube insertion site: oral  Blade: Lluvia  Blade size: #3  ETT size (mm): 7.0  Cormack-Lehane Classification: grade IIa - partial view of glottis  Placement verified by: chest auscultation, capnometry and palpation of cuff   Measured from: lips  ETT to lips (cm): 22  Ventilation between attempts: none  Number of attempts at approach: 1  Number of other approaches attempted: 0

## 2025-06-13 NOTE — ANESTHESIA POSTPROCEDURE EVALUATION
Patient: Elma Cleveland    Procedure Summary       Date: 06/13/25 Room / Location: [facility] OR 05 / [facility]    Anesthesia Start: 0739 Anesthesia Stop: 0956    Procedure: ROBOTIC ENDOMETRIOSIS EXCISION/ CHROMOPERTUBATION Diagnosis:       Adenomyosis      Pelvic pain      Endometriosis      (Adenomyosis [N80.03])      (Pelvic pain [R10.2])      (Endometriosis [N80.9])    Surgeons: Faby Grider MD Responsible Provider: Steve Ferrell MD    Anesthesia Type: general ASA Status: 3            Anesthesia Type: general    Vitals Value Taken Time   BP 98/58 06/13/25 11:30   Temp 36.2 °C (97.2 °F) 06/13/25 09:50   Pulse 70 06/13/25 11:43   Resp 16 06/13/25 10:45   SpO2 95 % 06/13/25 11:43   Vitals shown include unfiled device data.    Anesthesia Post Evaluation    Patient location during evaluation: PACU  Patient participation: complete - patient participated  Level of consciousness: awake and alert  Pain score: 1  Pain management: adequate  Airway patency: patent  Cardiovascular status: acceptable  Respiratory status: acceptable  Hydration status: acceptable  Postoperative Nausea and Vomiting: none        No notable events documented.

## 2025-06-13 NOTE — PATIENT COMMUNICATION
Would continue with current dose as long as cramps are tolerable (try magnesium 400 mg to 500 mg twice daily), and not having other symptoms like palpitations, diarrhea, etc. Prefer to check thyroid function later unless symptoms worsen or new ones.

## 2025-06-13 NOTE — OP NOTE
ROBOTIC ENDOMETRIOSIS EXCISION/ CHROMOPERTUBATION Operative Note     Date: 2025  OR Location: [facility]    Name: Elma Cleveland, : 1985, Age: 40 y.o., MRN: [id number], Sex: female    Diagnosis  Pre-op Diagnosis      * Adenomyosis [N80.03]     * Pelvic pain [R10.2]     * Endometriosis [N80.9] Post-op Diagnosis     * Adenomyosis [N80.03]     * Pelvic pain [R10.2]     * Endometriosis [N80.9]     Procedures  ROBOTIC ENDOMETRIOSIS EXCISION/ CHROMOPERTUBATION  03961 - NC LAPS ABD PRTM&OMENTUM DX W/WO SPEC BR/WA SPX    ROBOTIC ENDOMETRIOSIS EXCISION/ CHROMOPERTUBATION   - NC SURGICAL TECHNIQUES REQUIRING USE OF ROBOTIC SURGICAL SYSTEM (LIST SEPARATELY IN ADDITION TO CODE FOR PRIMARY PROCEDURE)  Bilateral ureterolysis     Surgeons      * Faby Grider - Primary    Resident/Fellow/Other Assistant:  Surgeons and Role:  * No surgeons found with a matching role *    Staff:   Surgical Assistant: Gucci  Circulator: Yamilka Zunigaub Person: Margaret Clifford Person: Naomie    Anesthesia Staff: Anesthesiologist: Steve Ferrell MD  C-AA: [facility] Llamas    Procedure Summary  Anesthesia: General  ASA: III  Estimated Blood Loss: 10 mL  Intra-op Medications: * Intraprocedure medication information is unavailable because the case start and end events have not been set *           Anesthesia Record               Intraprocedure I/O Totals          Intake    LR bolus 600.00 mL    Total Intake 600 mL       Output    Est. Blood Loss 10 mL    Total Output 10 mL       Net    Net Volume 590 mL          Specimen:   ID Type Source Tests Collected by Time   1 : LEFT PELVIC SIDE WALL Tissue PELVIC SIDE WALL EXCISION SURGICAL PATHOLOGY EXAM Faby Grider MD 2025 0848   2 : RIGHT PELVIC SIDE WALL Tissue PELVIC SIDE WALL EXCISION SURGICAL PATHOLOGY EXAM Faby Grider MD 2025 0859   3 : POSTERIOR CUL DE SAC Tissue CUL DE SAC EXCISION SURGICAL PATHOLOGY EXAM Faby Grider MD 2025 0903   4 : BLADDER  PERITONEUM Tissue PERITONEUM EXCISION SURGICAL PATHOLOGY EXAM Faby Grider MD 6/13/2025 0912                 Drains and/or Catheters:   Urethral Catheter Latex 16 Fr. (Active)       Indications: Elma Cleveland is an 40 y.o. female who is having surgery for Adenomyosis [N80.03]  Pelvic pain [R10.2]  Endometriosis [N80.9].     Findings  Right Hemidiaphragm: normal, no evidence of endometriosis  Left Hemidiaphragm: normal, no evidence of endometriosis  Liver Edge: normal  Stomach Edge: normal     Large Intestines: normal, no evidence of endometriosis         Small Intestines: normal, no evidence of endometriosis  Appendix: normal appearing, no evidence of endometriosis    Right Ovary: normal  Right Fallopian Tube: normal  Left Ovary: normal  Left Fallopian Tube: normal  Uterus: 10 week size, mobile, no abnormalities  Chromopertubation: Excellent bilateral spillage      Right Pelvic Sidewall: peritoneal pocket  Right Uterosacral Ligament: gunpowder lesions   Left Pelvic Sidewall: white scar/plaque, gunpowder lesions   Left Uterosacral Ligament: white scar/plaque, gunpowder lesions   Posterior Cul De Sac: white scar, gunpowder lesions, peritoneal pocket   Bladder Peritoneum: small scattered gunpowder lesions      Description of Procedure  Patient was taken to the operating room where she was prepared and draped in the usual sterile fashion.  A uterine manipulator was placed.  A Crespo catheter was placed. Attention was then turned abdominally.  The base of the umbilicus was elevated using Kocher clamps.  The skin was incised with a scalpel.  The fascia was grasped with Kochers and entered sharply using a scalpel.  Peritoneum was bluntly opened using a Nmio clamp.  Intraperitoneal placement was confirmed via visualization of underlying bowel.     Virgie trocar was then placed at the umbilical entry site. Diagnostic laparoscopy was performed, and revealed findings as noted above.  No areas of trauma or injury  were noted immediately inferior to the umbilicus.  The patient was placed in steep Trendelenburg positioning.  Pelvic findings were as noted above.       Ancillary trocars were then placed under direct visualization. Two robotic trocars and one 8 mm assistant port were used. The small intestine was run from the ileocecal junction to the level of the duodenum.  No abnormalities were noted.  The robot was then docked.     The ovaries were pexied to the anterior abdominal wall using an 0 V-loc.  The posterior cul-de-sac was then inspected.  The pelvic sidewall peritoneum on the left was elevated at the pelvic brim away from underlying structures away from the ureter and iliac vasculature.  It was incised using monopolar electrosurgery.  The incision was carried parallel to the infundibulopelvic ligament, inferior to the ovarian stroma, and parallel to the uterus.  The peritoneum was then elevated away from the pelvic sidewall.  The ureter was identified and dissected.  The pelvic sidewall peritoneum was fully  from the retroperitoneal structures using a combination of monopolar electrosurgery and blunt dissection.  Full ureterolysis was completed to ensure that all peritoneum was being excised while a safe margin was being maintained from the ureter.  No areas of peritoneum were noted to remain on the left pelvic sidewall.     Attention was then turned to the right pelvic sidewall.  The peritoneum was excised in a manner identical to that completed on the left.  The posterior cul-de-sac and pelvic sidewalls were then thoroughly suction irrigated.  Excellent hemostasis was noted and no areas of trauma were noted.     Attention was then turned to the posterior cul-de-sac.  The posterior cul-de-sac peritoneum was fully excised using a combination of monopolar electrosurgery and blunt dissection.  Care was taken to avoid injury to the underlying rectum and bowel.  The pexied ovaries were released.  Excellent  hemostasis was noted.       Attention was then turned anteriorly.  The bladder peritoneum was incised using monopolar electrosurgery.  A peritoneal incision was carried from the left round ligament to the pubic symphysis and ended at the right round ligament.  The peritoneum was then stripped away from the underlying bladder using a combination of monopolar electrosurgery and blunt dissection.  Full excision of bladder peritoneum was noted at the conclusion of this dissection.    Chromopertubation was then performed with normal saline, showing findings as above.      The pelvis was then thoroughly suction irrigated.  Excellent hemostasis was noted.  All instruments were then removed from the abdomen and pelvis.  The robot was undocked.     The umbilical fascia was then closed with 0 Vicryl suture.  The skin was closed with 4-0 Monocryl.  The uterine manipulator was then removed. The Crespo catheter was removed. The patient was awakened from general anesthesia and taken the recovery room in stable condition.      I was present and scrubbed for the entirety of the surgical procedure. Bilateral ureterolysis was required for safe completion of this procedure and to adequately address endometriosis.       Evidence of Infection:   Complications:  None; patient tolerated the procedure well.    Disposition: PACU - hemodynamically stable.  Condition: stable         Task Performed by RNFA or Surgical Assistant:  Bedside assistance      Faby Grider  Phone Number: [phone]

## 2025-06-13 NOTE — H&P
Gynecologic Surgery History and Physical    Subjective   Elma Cleveland is a 40 y.o. G0 with hx of pelvic pain, possibly 2/2 adenomyosis or endometriosis, presenting for robotic endometriosis excision, chromopertubation, appendectomy, possible Mirena placement and any indicated procedures.    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 Pylori (treated), POTS, ?MCAD, Autosomal Dominant familial mediterranean fever    PSH:   laparoscopic cholecystectomy, tonsillectomy/adenoidectomy, bilateral inguinal hernia repair, knee surgery x 3, cystoscopy for stone     Imaging:   - Pelvic US 2025 showed uterus measuring 5cm x 4cm x 8cm. Heterogenous myometrium. Suspicious for adenomyosis.   -Pelvic MRI 2025 showed no adnexal masses or measurable endometrial implants, no definite adenomyosis to correlate with the prior ultrasound, left corpus luteal cyst measuring 1.9 cm    Obstetrical History   OB History    Para Term  AB Living   0 0 0 0 0 0   SAB IAB Ectopic Multiple Live Births   0 0 0 0 0       Past Medical History  Past Medical History:   Diagnosis Date    Acute vaginitis 2020    Vaginitis and vulvovaginitis    ADHD (attention deficit hyperactivity disorder)     Adverse effect of anesthesia     Allergic     Anesthesia of skin 10/03/2017    Right arm numbness    Autoimmune disorder (Multi)     Carpal tunnel syndrome, left upper limb 2016    Left carpal tunnel syndrome    Concussion with loss of consciousness of unspecified duration, subsequent encounter     Concussion with loss of consciousness of unspecified duration, subsequent encounter    Diarrhea, unspecified 2018    Acute diarrhea    Disease of thyroid gland     Dry eye syndrome of bilateral lacrimal glands     Insufficiency of tear film of both eyes    Encounter for surveillance of contraceptive pills 2018    Encounter for birth control pills  maintenance    GERD (gastroesophageal reflux disease)     Hashimoto's disease     Hypo-osmolality and hyponatremia 04/05/2020    Hyponatremia    Immunization not carried out because of patient refusal 07/05/2019    Influenza vaccination declined    Left lower quadrant abdominal tenderness 08/22/2017    Abdominal left lower quadrant tenderness    Left lower quadrant pain 11/17/2016    Colicky LLQ abdominal pain    Nephrolithiasis     Other conditions influencing health status 08/22/2017    History of chronic diarrhea    Other intervertebral disc displacement, lumbar region     Herniated lumbar disc without myelopathy    Other malaise 09/10/2020    Malaise and fatigue    Other specified noninflammatory disorders of vagina     Vaginal lesion    Other symptoms and signs involving the musculoskeletal system 10/04/2018    Weakness of right foot    Other symptoms and signs involving the musculoskeletal system 09/28/2018    Weakness of right foot    Other symptoms and signs involving the musculoskeletal system 10/02/2018    Weakness of right lower extremity    Pain in right knee 04/25/2018    Right knee pain    Pain in unspecified foot 09/10/2020    Foot pain    Pelvic and perineal pain 06/05/2020    Pelvic pain    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    Personal history of diseases of the blood and blood-forming organs and certain disorders involving the immune mechanism     History of autoimmune disorder    Personal history of diseases of the skin and subcutaneous tissue 06/10/2016    History of allergic urticaria    Personal history of other benign neoplasm     History of adenoma of adrenal gland    Personal history of other diseases of the circulatory system 10/25/2016    History of irregular heartbeat    Personal history of other diseases of the digestive system 07/05/2019    History of left inguinal hernia    Personal history of other diseases of the digestive system  02/28/2018    History of hemorrhoids    Personal history of other diseases of the female genital tract 05/22/2018    History of menorrhagia    Personal history of other diseases of the musculoskeletal system and connective tissue 09/02/2020    History of joint pain    Personal history of other diseases of the musculoskeletal system and connective tissue 09/10/2020    History of joint pain    Personal history of other diseases of the respiratory system 02/28/2018    History of acute sinusitis    Personal history of other diseases of the respiratory system     History of asthma    Personal history of other diseases of the respiratory system 12/21/2016    History of acute sinusitis    Personal history of other endocrine, nutritional and metabolic disease 04/06/2020    History of hyperkalemia    Personal history of other infectious and parasitic diseases     History of mononucleosis    Personal history of other specified conditions 07/05/2019    History of nausea    Personal history of other specified conditions 03/27/2019    History of palpitations    Personal history of other specified conditions 08/22/2017    History of urinary frequency    Personal history of other specified conditions 08/12/2021    History of dizziness    Personal history of other specified conditions 07/05/2019    History of headache    Personal history of other specified conditions 09/10/2020    History of numbness    Personal history of other specified conditions 12/30/2016    History of weight gain    Personal history of traumatic brain injury 06/04/2018    History of concussion    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)     PONV (postoperative nausea and vomiting)     Rash and other nonspecific skin eruption 06/10/2016    Rash of unknown cause    Rheumatoid arthritis     Right upper quadrant pain 12/03/2019    Right upper quadrant abdominal pain    Syncope     Syncope and collapse 02/28/2018    Vasovagal near syncope    Tremor,  unspecified 12/19/2016    Tremor of right hand    Unspecified abdominal pain 12/12/2019    Abdominal pain of unknown etiology    Unspecified asthma with (acute) exacerbation ([facility]) 02/28/2018    Acute asthma exacerbation    Unspecified symptoms and signs involving the genitourinary system 12/05/2019    UTI symptoms    Urinary tract infection     Urogenital trichomoniasis     Vision loss         Past Surgical History   Past Surgical History:   Procedure Laterality Date    ADENOIDECTOMY  10/24/2016    Adenoidectomy    CHOLECYSTECTOMY      COLONOSCOPY      CYSTOSCOPY      GALLBLADDER SURGERY  06/10/2016    Gallbladder Surgery    HERNIA REPAIR  06/10/2016    Hernia Repair    KNEE SURGERY Left 06/10/2016    2001, 2007, 2008    LYMPH NODE BIOPSY      OTHER SURGICAL HISTORY  10/24/2016    Deep Cervical Lymph Node Biopsy    TONSILLECTOMY  10/24/2016    Tonsillectomy    UPPER GASTROINTESTINAL ENDOSCOPY         Social History  Social History     Tobacco Use    Smoking status: Never    Smokeless tobacco: Never   Substance Use Topics    Alcohol use: Yes     Comment: 2 drinks per month maybe     Substance and Sexual Activity   Drug Use Never       Allergies  Ciprofloxacin, Coconut milk, Codeine sulfate, Hydroxychloroquine, Oxycodone-acetaminophen, Tuberculin ppd, Codeine, Gum facewn-cjcmgk-ruig-alcohol, Sodium lauroylsarcosinate, Sodium lauryl sarcosinate, Adhesive tape-silicones, Blue dye, Dibucaine, Gold keratinate, Gold sodium thiosulfate, Lidocaine, Linalool, Nickel, Sodium laureth sulfate, Sodium thiosulfate, Thimerosal, Wound dressings, and Yellow dye     Medications  No medications prior to admission.       ROS: negative except per HPI    Objective    Last Vitals  There were no vitals taken for this visit.    Physical Examination  General: no acute distress  HEENT: normocephalic, atraumatic  Heart: warm and well perfused  Lungs: breathing comfortably on room air  Abdomen: nondistended  Extremities: moving all  extremities  Neuro: awake and conversant  Psych: appropriate mood and affect    Lab Review          Lab Results   Component Value Date    WBC 5.1 03/21/2025    HGB 14.6 03/21/2025    HCT 44.6 03/21/2025    MCV 92.5 03/21/2025     03/21/2025       Lab Results   Component Value Date    GLUCOSE 90 03/21/2025    CALCIUM 9.3 03/21/2025     03/21/2025    K 4.2 03/21/2025    CO2 26 03/21/2025     03/21/2025    BUN 18 03/21/2025    CREATININE 0.78 03/21/2025       Assessment/Plan     Elma Cleveland is a 40 y.o. with hx of pelvic pain, possibly 2/2 adenomyosis or endometriosis presenting for scheduled surgery.    Plan to proceed with robotic endometriosis excision, chromopertubation, appendectomy, possible Mirena placement and any indicated procedures.  Surgical consent was reviewed. The risks of surgery were discussed including: bleeding (including need for blood transfusion in life-threatening situations; risks of transfusion), infection, damage to surrounding organs. The patient had the opportunity to answer questions and desired to proceed with surgery following our discussion. Both verbal and written consents were obtained.    Pt and plan discussed with Dr. Marni Harris, M4    I saw and evaluated the patient. I personally obtained the key and critical portions of the history and physical exam or was physically present for key and critical portions performed by the resident/fellow. I reviewed the resident/fellow's documentation and discussed the patient with the resident/fellow. I agree with the resident/fellow's medical decision making as documented in the note.    Faby Grider MD

## 2025-06-13 NOTE — ANESTHESIA PREPROCEDURE EVALUATION
Patient: Elma Cleveland    Procedure Information       Date/Time: 06/13/25 0730    Procedure: ROBOTIC ENDOMETRIOSIS EXCISION/ CHROMOPERTUBATION/ APPENDECTOMY/ POSSIBLE MIRENA AND ANY INDICATED PROCEDURE    Location: [facility] / [facility]    Surgeons: Faby Grider MD            Relevant Problems   Pulmonary   (+) Asthma   (+) Mild intermittent asthma without complication (HHS-HCC)      Neuro   (+) Premenstrual dysphoric syndrome   (+) Ulnar neuropathy at elbow, right      GI   (+) GERD (gastroesophageal reflux disease)   (+) IBS (irritable bowel syndrome)      Musculoskeletal   (+) Cervical stenosis of spine   (+) Herniation of lumbar intervertebral disc without myelopathy   (+) Rheumatoid arthritis without rheumatoid factor, multiple sites (Multi)      HEENT   (+) Seasonal allergies      ID   (+) Genital herpes simplex type 2      GYN   (+) Endometriosis   (+) Menorrhagia       Clinical information reviewed:   Tobacco  Allergies  Meds   Med Hx  Surg Hx  OB Status  Fam Hx  Soc   Hx        NPO Detail:  NPO/Void Status  Carbohydrate Drink Given Prior to Surgery? : Y  Date of Last Liquid: 06/13/25  Time of Last Liquid: 0400  Date of Last Solid: 06/12/25  Time of Last Solid: 2300  Last Intake Type: Carbohydrate drink         Physical Exam    Airway  Mallampati: II  TM distance: >3 FB  Neck ROM: limited  Mouth opening: 3 or more finger widths     Cardiovascular   Rhythm: regular     Dental - normal exam     Pulmonary    Abdominal            Anesthesia Plan    History of general anesthesia?: yes  History of complications of general anesthesia?: no    ASA 3     general   (Plan TIVA)  The patient is not a current smoker.  Patient was not previously instructed to abstain from smoking on day of procedure.  Patient did not smoke on day of procedure.    intravenous induction   Postoperative pain plan includes opioids.  Anesthetic plan and risks discussed with patient.  Use of blood products discussed with  patient who.    Plan discussed with [facility].

## 2025-06-16 ENCOUNTER — APPOINTMENT (OUTPATIENT)
Dept: RADIOLOGY | Facility: HOSPITAL | Age: 40
End: 2025-06-16
Payer: COMMERCIAL

## 2025-06-16 ENCOUNTER — HOSPITAL ENCOUNTER (EMERGENCY)
Facility: HOSPITAL | Age: 40
Discharge: HOME | End: 2025-06-16
Attending: EMERGENCY MEDICINE
Payer: COMMERCIAL

## 2025-06-16 ENCOUNTER — TELEPHONE (OUTPATIENT)
Dept: OBSTETRICS AND GYNECOLOGY | Facility: CLINIC | Age: 40
End: 2025-06-16
Payer: COMMERCIAL

## 2025-06-16 VITALS
WEIGHT: 178 LBS | HEART RATE: 74 BPM | OXYGEN SATURATION: 96 % | BODY MASS INDEX: 34.95 KG/M2 | SYSTOLIC BLOOD PRESSURE: 120 MMHG | HEIGHT: 60 IN | RESPIRATION RATE: 18 BRPM | DIASTOLIC BLOOD PRESSURE: 84 MMHG | TEMPERATURE: 96.8 F

## 2025-06-16 DIAGNOSIS — R10.30 LOWER ABDOMINAL PAIN: Primary | ICD-10-CM

## 2025-06-16 DIAGNOSIS — R19.7 DIARRHEA, UNSPECIFIED TYPE: ICD-10-CM

## 2025-06-16 LAB
ALBUMIN SERPL BCP-MCNC: 3.9 G/DL (ref 3.4–5)
ALP SERPL-CCNC: 55 U/L (ref 33–110)
ALT SERPL W P-5'-P-CCNC: 19 U/L (ref 7–45)
ANION GAP SERPL CALC-SCNC: 14 MMOL/L (ref 10–20)
APPEARANCE UR: CLEAR
AST SERPL W P-5'-P-CCNC: 17 U/L (ref 9–39)
BILIRUB SERPL-MCNC: 0.5 MG/DL (ref 0–1.2)
BILIRUB UR STRIP.AUTO-MCNC: NEGATIVE MG/DL
BUN SERPL-MCNC: 10 MG/DL (ref 6–23)
CALCIUM SERPL-MCNC: 9.2 MG/DL (ref 8.6–10.3)
CHLORIDE SERPL-SCNC: 102 MMOL/L (ref 98–107)
CO2 SERPL-SCNC: 25 MMOL/L (ref 21–32)
COLOR UR: COLORLESS
CREAT SERPL-MCNC: 0.77 MG/DL (ref 0.5–1.05)
EGFRCR SERPLBLD CKD-EPI 2021: >90 ML/MIN/1.73M*2
ERYTHROCYTE [DISTWIDTH] IN BLOOD BY AUTOMATED COUNT: 12.3 % (ref 11.5–14.5)
GLUCOSE SERPL-MCNC: 88 MG/DL (ref 74–99)
GLUCOSE UR STRIP.AUTO-MCNC: NORMAL MG/DL
HCG UR QL IA.RAPID: NEGATIVE
HCT VFR BLD AUTO: 41.6 % (ref 36–46)
HGB BLD-MCNC: 13 G/DL (ref 12–16)
HOLD SPECIMEN: NORMAL
KETONES UR STRIP.AUTO-MCNC: NEGATIVE MG/DL
LACTATE SERPL-SCNC: 0.8 MMOL/L (ref 0.4–2)
LEUKOCYTE ESTERASE UR QL STRIP.AUTO: NEGATIVE
LIPASE SERPL-CCNC: 9 U/L (ref 9–82)
MCH RBC QN AUTO: 30.3 PG (ref 26–34)
MCHC RBC AUTO-ENTMCNC: 31.3 G/DL (ref 32–36)
MCV RBC AUTO: 97 FL (ref 80–100)
NITRITE UR QL STRIP.AUTO: NEGATIVE
NRBC BLD-RTO: 0 /100 WBCS (ref 0–0)
PH UR STRIP.AUTO: 7.5 [PH]
PLATELET # BLD AUTO: 252 X10*3/UL (ref 150–450)
POTASSIUM SERPL-SCNC: 3.9 MMOL/L (ref 3.5–5.3)
PROT SERPL-MCNC: 6.5 G/DL (ref 6.4–8.2)
PROT UR STRIP.AUTO-MCNC: NEGATIVE MG/DL
RBC # BLD AUTO: 4.29 X10*6/UL (ref 4–5.2)
RBC # UR STRIP.AUTO: ABNORMAL MG/DL
RBC #/AREA URNS AUTO: NORMAL /HPF
SODIUM SERPL-SCNC: 137 MMOL/L (ref 136–145)
SP GR UR STRIP.AUTO: 1.01
SQUAMOUS #/AREA URNS AUTO: NORMAL /HPF
UROBILINOGEN UR STRIP.AUTO-MCNC: NORMAL MG/DL
WBC # BLD AUTO: 8.8 X10*3/UL (ref 4.4–11.3)
WBC #/AREA URNS AUTO: NORMAL /HPF

## 2025-06-16 PROCEDURE — 2550000001 HC RX 255 CONTRASTS: Performed by: EMERGENCY MEDICINE

## 2025-06-16 PROCEDURE — 83690 ASSAY OF LIPASE: CPT | Performed by: EMERGENCY MEDICINE

## 2025-06-16 PROCEDURE — 74177 CT ABD & PELVIS W/CONTRAST: CPT | Performed by: RADIOLOGY

## 2025-06-16 PROCEDURE — 36415 COLL VENOUS BLD VENIPUNCTURE: CPT | Performed by: EMERGENCY MEDICINE

## 2025-06-16 PROCEDURE — 74177 CT ABD & PELVIS W/CONTRAST: CPT

## 2025-06-16 PROCEDURE — 85027 COMPLETE CBC AUTOMATED: CPT | Performed by: EMERGENCY MEDICINE

## 2025-06-16 PROCEDURE — 2500000001 HC RX 250 WO HCPCS SELF ADMINISTERED DRUGS (ALT 637 FOR MEDICARE OP): Performed by: EMERGENCY MEDICINE

## 2025-06-16 PROCEDURE — 81001 URINALYSIS AUTO W/SCOPE: CPT | Performed by: EMERGENCY MEDICINE

## 2025-06-16 PROCEDURE — 83605 ASSAY OF LACTIC ACID: CPT | Performed by: EMERGENCY MEDICINE

## 2025-06-16 PROCEDURE — 81025 URINE PREGNANCY TEST: CPT | Performed by: EMERGENCY MEDICINE

## 2025-06-16 PROCEDURE — 84075 ASSAY ALKALINE PHOSPHATASE: CPT | Performed by: EMERGENCY MEDICINE

## 2025-06-16 PROCEDURE — 99285 EMERGENCY DEPT VISIT HI MDM: CPT | Mod: 25 | Performed by: EMERGENCY MEDICINE

## 2025-06-16 RX ORDER — DICYCLOMINE HYDROCHLORIDE 20 MG/1
20 TABLET ORAL 2 TIMES DAILY
Qty: 14 TABLET | Refills: 0 | Status: SHIPPED | OUTPATIENT
Start: 2025-06-16 | End: 2025-06-23

## 2025-06-16 RX ORDER — CYCLOBENZAPRINE HCL 10 MG
10 TABLET ORAL NIGHTLY
Qty: 7 TABLET | Refills: 0 | Status: SHIPPED | OUTPATIENT
Start: 2025-06-16 | End: 2025-06-23

## 2025-06-16 RX ORDER — DICYCLOMINE HYDROCHLORIDE 10 MG/1
20 CAPSULE ORAL ONCE
Status: COMPLETED | OUTPATIENT
Start: 2025-06-16 | End: 2025-06-16

## 2025-06-16 RX ORDER — ACETAMINOPHEN 325 MG/1
975 TABLET ORAL ONCE
Status: COMPLETED | OUTPATIENT
Start: 2025-06-16 | End: 2025-06-16

## 2025-06-16 RX ORDER — DICYCLOMINE HYDROCHLORIDE 10 MG/ML
20 INJECTION INTRAMUSCULAR ONCE
Status: DISCONTINUED | OUTPATIENT
Start: 2025-06-16 | End: 2025-06-16

## 2025-06-16 RX ADMIN — IOHEXOL 75 ML: 350 INJECTION, SOLUTION INTRAVENOUS at 13:31

## 2025-06-16 RX ADMIN — DICYCLOMINE HYDROCHLORIDE 20 MG: 10 CAPSULE ORAL at 12:08

## 2025-06-16 RX ADMIN — ACETAMINOPHEN 975 MG: 325 TABLET, FILM COATED ORAL at 14:13

## 2025-06-16 ASSESSMENT — PAIN DESCRIPTION - DESCRIPTORS: DESCRIPTORS: CRAMPING

## 2025-06-16 ASSESSMENT — PAIN DESCRIPTION - LOCATION: LOCATION: ABDOMEN

## 2025-06-16 ASSESSMENT — PAIN - FUNCTIONAL ASSESSMENT: PAIN_FUNCTIONAL_ASSESSMENT: 0-10

## 2025-06-16 ASSESSMENT — PAIN SCALES - GENERAL: PAINLEVEL_OUTOF10: 5 - MODERATE PAIN

## 2025-06-16 NOTE — DISCHARGE INSTRUCTIONS
Please take the muscle relaxers in the evening.  Be aware that it may cause some sedation so avoid driving or performing any task that requires your full attention.  Follow-up with gynecology as an outpatient.  Return immediately if concerning symptoms, as discussed.

## 2025-06-16 NOTE — ED PROVIDER NOTES
Emergency Department Provider Note   HPI  Patient is a 40-year-old female with a past medical history significant for polyarthritis, GERD, Hashimoto's thyroiditis, Erler Danlos, IBS, syncope with associated abdominal discomfort, menorrhagia, inguinal hernia status postrepair, POTS and recent surgical intervention on Friday for endometriosis who presents for left lower quadrant abdominal pain.  She started having pain over the weekend particularly in the left lower quadrant.  She has nausea without vomiting but Zofran has helped.  She had some chills at home but no fever.  She has been experiencing a lot of cramping with associated watery diarrhea that is nonbloody and nonmelanotic.  She had some very scant vaginal bleeding that she was told was to be expected but did not require a pad as it was not significant.  This morning she had a lot of cramping with some diarrhea and was instructed to come to the emergency room after she called her gynecologist office, Dr. Grider.  After arriving here in the ED she had 5 episodes of blood on the wipe and some small dime size clots.  She thinks the diarrhea is improving.  Denies any other symptoms at this time other than decreased oral intake secondary to her symptoms.      PMHx: As above  PSHx: As above also includes cholecystectomy  FamilyHx: Denies  SocialHx: Denies  Allergies: Several per EMR  Medications: See Medication Reconciliation     ROS  As above otherwise denies      Physical Exam    GENERAL: Awake and Alert, No Acute Distress  HEENT: AT/NC, PERRL, EOMI, Normal Oropharynx, No Signs of Dehydration  NECK: Normal Inspection, No JVD  CARDIOVASCULAR: RRR, No M/R/G  RESPIRATORY: CTA Bilaterally, No Wheezes, Rales or Rhonchi, Chest Wall Non-tender  ABDOMEN: Soft, some tenderness to palpation in the left lower quadrant, Normal Bowel Sounds, No Distention  BACK: No CVA Tenderness  SKIN: Normal Color, Warm, Dry, No Rashes   EXTREMITIES: Non-Tender, Full ROM, No Pedal  Edema  NEURO: A&O x 3, Normal Motor and Sensation, Normal Mood and Affect    Nursing Assessment and Vitals Reviewed    Medical Decision  Patient is a 40-year-old female with a past medical history significant for polyarthritis, GERD, Hashimoto's thyroiditis, Erler Danlos, IBS, syncope with associated abdominal discomfort, menorrhagia, inguinal hernia status postrepair, POTS and recent surgical intervention on Friday for endometriosis who presents for left lower quadrant abdominal pain.  She started having pain over the weekend particularly in the left lower quadrant.  She has nausea without vomiting but Zofran has helped.  She had some chills at home but no fever.  She has been experiencing a lot of cramping with associated watery diarrhea that is nonbloody and nonmelanotic.  She had some very scant vaginal bleeding that she was told was to be expected but did not require a pad as it was not significant.  This morning she had a lot of cramping with some diarrhea and was instructed to come to the emergency room after she called her gynecologist office, Dr. Grider.  After arriving here in the ED she had 5 episodes of blood on the wipe and some small dime size clots.  She thinks the diarrhea is improving.  Denies any other symptoms at this time other than decreased oral intake secondary to her symptoms.    On evaluation patient is well-appearing and in no acute distress.  Lungs are clear and heart is regular.  Abdomen is tender to palpation in the left lower quadrant with some guarding but no peritoneal signs are appreciated and surgical incision sites look well-healed without signs of infection.  Patient does have history of allergies to some pain medications but is unsure exactly what she gets while she is in the hospital.  Will start with some Bentyl given that her pain is more described as a cramping associated with the diarrhea.  Given the recent surgical intervention will obtain imaging.    Workup for patient  included labs that did not reveal any emergent electrolyte imbalance.  Lactate and lipase are within normal limits.  Patient has no leukocytosis or anemia.  Urinalysis is show 2+ blood but no signs of infection.  CT of the abdomen and pelvis showed trace postoperative day report peritoneum as well as extraperitoneal air along the pelvic sidewalls and within the abdominal wall.  There were no other acute findings in the abdomen or pelvis.  During her prolonged stay patient has not experienced any emesis and has not been able to provide a stool sample.  She remains well-appearing and in no acute distress.  She was discussed with Dr. Grider, her gynecologist.  After evaluating her results and imaging she recommended starting patient on Flexeril as she feels like a lot of her discomfort may be secondary to pelvic floor dysfunction.  Low suspicion for C. difficile at this time given negative CT, no bowel movement for several hours.  She will see her in the office as an outpatient.  Patient has remained in stable condition in the ED and is discharged with education on supportive care at home as well as signs and symptoms that should prompt immediate return to emergency room.    Ting Mae MD  Emergency Medicine                                                       Ting Mae MD  06/16/25 [id number]

## 2025-06-16 NOTE — TELEPHONE ENCOUNTER
Patient stated that she just had surgery Friday June 13 th and stated she is in a lot of pain and is now at the ED here at [facility] and wanted to speak to the nurse on Dr. Grider team about what to do.      Patient can be reached at- [phone]

## 2025-06-16 NOTE — ED TRIAGE NOTES
Pt c/o abdominal pain and cramping, diarrhea and nausea. Pt. States she had abdominal surgery on Friday for endometriosis.

## 2025-06-16 NOTE — TELEPHONE ENCOUNTER
Spoke with patient, she is currently at [facility] for intense abdominal pain and diarrhea post-op.  Advised I will send Dr. Grider a message to update her.

## 2025-06-18 ENCOUNTER — PHARMACY VISIT (OUTPATIENT)
Dept: PHARMACY | Facility: CLINIC | Age: 40
End: 2025-06-18
Payer: COMMERCIAL

## 2025-06-20 ENCOUNTER — APPOINTMENT (OUTPATIENT)
Dept: PODIATRY | Facility: CLINIC | Age: 40
End: 2025-06-20
Payer: COMMERCIAL

## 2025-06-22 ENCOUNTER — TELEPHONE (OUTPATIENT)
Dept: OBSTETRICS AND GYNECOLOGY | Facility: HOSPITAL | Age: 40
End: 2025-06-22
Payer: COMMERCIAL

## 2025-06-22 DIAGNOSIS — T78.40XA ALLERGIC REACTION, INITIAL ENCOUNTER: Primary | ICD-10-CM

## 2025-06-22 RX ORDER — HYDROXYZINE HYDROCHLORIDE 25 MG/1
25 TABLET, FILM COATED ORAL EVERY 6 HOURS PRN
Qty: 30 TABLET | Refills: 0 | Status: SHIPPED | OUTPATIENT
Start: 2025-06-22

## 2025-06-22 ASSESSMENT — ENCOUNTER SYMPTOMS
ENDOCRINE NEGATIVE: 1
HEMATOLOGIC/LYMPHATIC NEGATIVE: 1
PSYCHIATRIC NEGATIVE: 1
GASTROINTESTINAL NEGATIVE: 1
CARDIOVASCULAR NEGATIVE: 1
RESPIRATORY NEGATIVE: 1
CONSTITUTIONAL NEGATIVE: 1
NEUROLOGICAL NEGATIVE: 1

## 2025-06-22 NOTE — PROGRESS NOTES
Elma Cleveland female   1985 40 y.o.   [id number]      Chief Complaint    New Patient Visit          HPI  Elma Cleveland is a 40 y.o.  female [facility] care. She denies breast surgery or biopsy.  She states she had negative genetic testing for pathogenic mutation. She has history of breast cysts.  She has family history of breast and ovarian cancer.     She has Doug Danlos, POTS, ADHD, polyarthritis, undifferentiated mixed connective tissue disease.  6/13/2025 she had robotic endometriosis excision.  She is not ready to close the door to future fertility.    BREAST IMAGING: remote breast ultrasound    REPRODUCTIVE HISTORY: menarche age 13, nullipara, premenopausal,     FAMILY CANCER HISTORY:   Mother: ovarian cancer at 71, negative genetic testing  Maternal grandmother: breast cancer at 78  Maternal aunt: breast cancer 40s  Paternal aunt: breast cancer at 68, negative genetic testing  Paternal grandmother: breast cancer at 78      REVIEW OF SYSTEMS  Review of Systems   Constitutional: Negative.    HENT:  Negative.     Eyes:  Positive for eye problems.   Respiratory: Negative.     Cardiovascular: Negative.    Gastrointestinal: Negative.    Endocrine: Negative.    Genitourinary:  Positive for pelvic pain.    Musculoskeletal:  Positive for arthralgias.   Skin:  Positive for itching and rash.   Neurological: Negative.    Hematological: Negative.    Psychiatric/Behavioral: Negative.               MEDICATIONS  Current Outpatient Medications   Medication Instructions    acyclovir (Zovirax) 5 % ointment 1 Application    certolizumab pegol (Cimzia) injection INJECT 2 SYRINGES (400MG) UNDER THE SKIN ONCE EVERY 4 WEEKS.    cetirizine (ZYRTEC) 10 mg, Daily    cholecalciferol (VITAMIN D-3) 2,000 Units, Daily    cyclobenzaprine (FLEXERIL) 10 mg, oral, Nightly    famotidine (PEPCID) 40 mg, oral, Every 12 hours    hydrOXYzine HCL (ATARAX) 25 mg, oral,  Every 6 hours PRN    mometasone (Nasonex) 50 mcg/actuation nasal spray Administer into affected nostril(s).    NP Thyroid 60 mg, oral, Daily    pantoprazole (PROTONIX) 40 mg, oral, Daily before breakfast, Do not crush, chew, or split.    predniSONE 10 mg tablets,dose pack Four day PO taper. Please take 4 tablets on first day, 3 tablets on second day, 2 tablets on third day, and 1 tablet on last day.    sennosides-docusate sodium (Misty-Colace) 8.6-50 mg tablet 1 tablet, oral, 2 times daily, Stop for diarrhea        ALLERGIES  RX Allergies[1]     Patient Active Problem List    Diagnosis Date Noted    Pelvic pain in female 02/24/2025    Herniation of lumbar intervertebral disc without myelopathy 03/12/2024    Paresis of single lower extremity 03/12/2024    Tear film insufficiency 03/12/2024    Thoracic back pain 03/12/2024    Spondylolisthesis, lumbar region 08/31/2023    Asthma 08/30/2023    Connective tissue disease (Multi) 08/30/2023    Menorrhagia 08/30/2023    Rheumatoid arthritis without rheumatoid factor, multiple sites (Multi) 08/30/2023    Unilateral recurrent inguinal hernia with obstruction but no gangrene 08/30/2023    POTS (postural orthostatic tachycardia syndrome) 08/26/2023    Hemorrhoids 04/27/2023    Pulsatile tinnitus of right ear 04/27/2023    Seasonal allergies 04/27/2023    Acne vulgaris 04/19/2023    ADD (attention deficit disorder) 04/19/2023    Allergic rhinitis 04/19/2023    Autoimmune disorder (Multi) 04/19/2023    Bilateral myopia 04/19/2023    Cervical stenosis of spine 04/19/2023    Disturbance of smell and taste 04/19/2023    Functional neurological symptom disorder with weakness or paralysis 04/19/2023    Genital herpes simplex type 2 04/19/2023    GERD (gastroesophageal reflux disease) 04/19/2023    Hashimoto's thyroiditis 04/19/2023    Hirsutism 04/19/2023    Bae's reflex positive 04/19/2023    Hyperandrogenism 04/19/2023    Hypermobile Doug-Danlos syndrome (HHS-HCC) 04/19/2023     IBS (irritable bowel syndrome) 04/19/2023    Keratoconjunctivitis sicca not specified as Sjogren's 04/19/2023    Mild intermittent asthma without complication (HHS-HCC) 04/19/2023    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) 04/19/2023    Premenstrual dysphoric syndrome 04/19/2023    Syncope, near 04/19/2023    Sensitive skin 04/19/2023    Ulnar neuropathy at elbow, right 04/19/2023    Undifferentiated connective tissue disease (Multi) 04/19/2023    Vitamin D deficiency 04/19/2023    Psychologic conversion disorder 04/19/2023    Chondromalacia of patella 05/26/2005    Adenomyosis 04/03/2025    Pelvic pain 04/03/2025    Endometriosis 04/03/2025     Medical History[2]   Surgical History[3]   Family History[4]       SOCIAL HISTORY      Social History     Tobacco Use    Smoking status: Never    Smokeless tobacco: Never   Substance Use Topics    Alcohol use: Yes     Comment: 2 drinks per month maybe        VITALS  Vitals:    06/24/25 0810   BP: 126/84   Pulse: (!) 121   Resp: 18   Temp: 37 °C (98.6 °F)        PHYSICAL EXAM  Patient is alert and oriented x3, with appropriate mood. The gait is steady and hand grasps are equal. Sclera clear. The breasts are nearly symmetrical. The tissue is soft without palpable abnormalities, discrete nodules or masses. The skin and nipples appear normal. There is no cervical, supraclavicular, or axillary lymphadenopathy palpable. Heart rate and rhythm normal, S1 and S2 appreciated. The lungs are clear bilaterally.     Physical Exam     IMAGING    Time was spent viewing digital images of the radiology testing with the patient.     RISK PROFILE      ORDERS  Orders Placed This Encounter   Procedures    BI mammo bilateral screening tomosynthesis     Standing Status:   Future     Expected Date:   6/12/2026     Expiration Date:   8/24/2026     Perform a breast ultrasound if clinically indicated by Radiologist?:   Yes     Previous  Mamm performed at  location?:   Yes     Reason for exam::   .     Radiologist to Determine Optimal Study:   Yes     [facility] to [facility]:   Immediate     Is this exam part of a Research Study? If Yes, link this order to the research study:   No     Is the patient pregnant?:   No          ASSESSMENT/PLAN  1. Encounter for screening mammogram for malignant neoplasm of breast  BI mammo bilateral screening tomosynthesis      2. FH: breast cancer  Referral to High Risk Breast Cancer      3. FH: ovarian cancer in first degree relative  Referral to High Risk Breast Cancer      4. Breast cancer screening, high risk patient  Clinic Appointment Request             Follow up in about 1 year (around 6/24/2026) for 1 year for clinic exam, with or after recommended imaging.  HIGH RISK PLAN  Clinical breast examinations  Yearly mammogram at age 40  Breast MRI is an additional screening tool ([phone] to schedule) low low 5 year risk, hold off on MRI  Maintain a healthy weight (BMI 19-25), obesity increases risk of breast cancer  Vitamin D3 2000 IU daily or dose recommended by your provider  Limit alcohol intake to no more than 3-4 drinks/week or less  Healthy diet for cancer prevention, low fat, lean proteins, many fruits & vegetables  Moderate exercise - Goal is 150 minutes of exercise a week  Do NOT use any tobacco products (such as cigarettes, electronic cigarettes, vaping, cigars)  Be aware of how your breasts feel & report any concerns or changes  Risk model indicate personal risk of breast cancer in the next 5 years and lifetime (age 85-90):  Kelly: 5-year risk 1.4% (average 0.6%), lifetime risk 24.7%, (average 10.9%)          Oneida Murphy, STEPHANIE-[facility]         [1]   Allergies  Allergen Reactions    Ciprofloxacin Other     Reaction: Vomiting    Coconut Milk Anaphylaxis    Codeine Sulfate Other     NIGHTMARES AND SLEEPWALKING    Hydroxychloroquine Rash     Full body  rash    Oxycodone-Acetaminophen Itching     full body itching    Tuberculin Ppd Unknown and Rash     ALLERGY TO BOTH APLISOL AND TUBERSOL CAUSING REDNESS, RASH AND IRRITATION UNDER SKIN.  ANNUAL SCREENING IS A TB GOLD.    Codeine Hives    Gum Aqwywr-Oemyom-Esky-Alcohol Unknown    Sodium Lauroylsarcosinate Unknown    Sodium Lauryl Sarcosinate Unknown    Adhesive Tape-Silicones Rash    Blue Dye Hives and Rash    Dibucaine Hives and Rash     Other Reaction(s): Unknown    Gold Keratinate Hives and Rash    Gold Sodium Thiosulfate Rash    Lidocaine Hives and Rash    Linalool Hives and Rash    Nickel Hives and Rash    Sodium Laureth Sulfate Rash, Unknown and Hives    Sodium Thiosulfate Rash    Thimerosal Hives and Rash    Wound Dressings Hives and Rash     Other Reaction(s): hives    Yellow Dye Hives and Rash   [2]   Past Medical History:  Diagnosis Date    Acute vaginitis 02/01/2020    Vaginitis and vulvovaginitis    ADHD (attention deficit hyperactivity disorder)     Adverse effect of anesthesia     Allergic     Anesthesia of skin 10/03/2017    Right arm numbness    Arthritis     Autoimmune disorder (Multi)     Carpal tunnel syndrome, left upper limb 12/20/2016    Left carpal tunnel syndrome    Concussion with loss of consciousness of unspecified duration, subsequent encounter     Concussion with loss of consciousness of unspecified duration, subsequent encounter    Diarrhea, unspecified 11/08/2018    Acute diarrhea    Disease of thyroid gland     Dry eye syndrome of bilateral lacrimal glands     Insufficiency of tear film of both eyes    Encounter for surveillance of contraceptive pills 04/25/2018    Encounter for birth control pills maintenance    GERD (gastroesophageal reflux disease)     Hashimoto's disease     Headache     Hernia, internal     Bilateral inguinal hernias repaired    Herpes     Hypo-osmolality and hyponatremia 04/05/2020    Hyponatremia    Hypothyroidism     Immunization not carried out because of  patient refusal 07/05/2019    Influenza vaccination declined    Irritable bowel syndrome     Left lower quadrant abdominal tenderness 08/22/2017    Abdominal left lower quadrant tenderness    Left lower quadrant pain 11/17/2016    Colicky LLQ abdominal pain    Nephrolithiasis     Other conditions influencing health status 08/22/2017    History of chronic diarrhea    Other intervertebral disc displacement, lumbar region     Herniated lumbar disc without myelopathy    Other malaise 09/10/2020    Malaise and fatigue    Other specified noninflammatory disorders of vagina     Vaginal lesion    Other symptoms and signs involving the musculoskeletal system 10/04/2018    Weakness of right foot    Other symptoms and signs involving the musculoskeletal system 09/28/2018    Weakness of right foot    Other symptoms and signs involving the musculoskeletal system 10/02/2018    Weakness of right lower extremity    Pain in right knee 04/25/2018    Right knee pain    Pain in unspecified foot 09/10/2020    Foot pain    Pelvic and perineal pain 06/05/2020    Pelvic pain    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    Personal history of diseases of the blood and blood-forming organs and certain disorders involving the immune mechanism     History of autoimmune disorder    Personal history of diseases of the skin and subcutaneous tissue 06/10/2016    History of allergic urticaria    Personal history of other benign neoplasm     History of adenoma of adrenal gland    Personal history of other diseases of the circulatory system 10/25/2016    History of irregular heartbeat    Personal history of other diseases of the digestive system 07/05/2019    History of left inguinal hernia    Personal history of other diseases of the digestive system 02/28/2018    History of hemorrhoids    Personal history of other diseases of the female genital tract 05/22/2018    History of menorrhagia    Personal history  of other diseases of the musculoskeletal system and connective tissue 09/02/2020    History of joint pain    Personal history of other diseases of the musculoskeletal system and connective tissue 09/10/2020    History of joint pain    Personal history of other diseases of the respiratory system 02/28/2018    History of acute sinusitis    Personal history of other diseases of the respiratory system     History of asthma    Personal history of other diseases of the respiratory system 12/21/2016    History of acute sinusitis    Personal history of other endocrine, nutritional and metabolic disease 04/06/2020    History of hyperkalemia    Personal history of other infectious and parasitic diseases     History of mononucleosis    Personal history of other specified conditions 07/05/2019    History of nausea    Personal history of other specified conditions 03/27/2019    History of palpitations    Personal history of other specified conditions 08/22/2017    History of urinary frequency    Personal history of other specified conditions 08/12/2021    History of dizziness    Personal history of other specified conditions 07/05/2019    History of headache    Personal history of other specified conditions 09/10/2020    History of numbness    Personal history of other specified conditions 12/30/2016    History of weight gain    Personal history of traumatic brain injury 06/04/2018    History of concussion    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)     PONV (postoperative nausea and vomiting)     Rash and other nonspecific skin eruption 06/10/2016    Rash of unknown cause    Rheumatoid arthritis     Right upper quadrant pain 12/03/2019    Right upper quadrant abdominal pain    Syncope     Syncope and collapse 02/28/2018    Vasovagal near syncope    Tremor, unspecified 12/19/2016    Tremor of right hand    Unspecified abdominal pain 12/12/2019    Abdominal pain of unknown etiology    Unspecified asthma with (acute)  exacerbation ([facility]) 02/28/2018    Acute asthma exacerbation    Unspecified symptoms and signs involving the genitourinary system 12/05/2019    UTI symptoms    Urinary tract infection     Urogenital trichomoniasis     Vision loss     Weakness of limb    [3]   Past Surgical History:  Procedure Laterality Date    ADENOIDECTOMY  10/24/2016    Adenoidectomy    CHOLECYSTECTOMY      COLONOSCOPY      CYSTOSCOPY      GALLBLADDER SURGERY  06/10/2016    Gallbladder Surgery    HERNIA REPAIR  06/10/2016    Hernia Repair    KNEE SURGERY Left 06/10/2016    2001, 2007, 2008    LYMPH NODE BIOPSY      OTHER SURGICAL HISTORY  10/24/2016    Deep Cervical Lymph Node Biopsy    TONSILLECTOMY  10/24/2016    Tonsillectomy    UPPER GASTROINTESTINAL ENDOSCOPY     [4]   Family History  Problem Relation Name Age of Onset    Thyroid disease Mother Blessing     Asthma Mother Blessing     Lupus Mother Blessing     Ovarian cancer Mother Blessing     Depression Mother Blessing     Hyperlipidemia Mother Blessing     Hypertension Mother Blessing     Arthritis Mother Blessing     Cancer Mother Blessing     ADD / ADHD Mother Blessing     Hypertension Father Linsey     Gout Father Linsey     Hyperlipidemia Father Linsey     No Known Problems Brother      Breast cancer Father's Sister Norina     Breast cancer Maternal Grandmother      Breast cancer Paternal Grandmother

## 2025-06-22 NOTE — TELEPHONE ENCOUNTER
"POD 9 s/p laparoscopic surgery. Yesterday developed hives/itching around incisions. Has tried Benadryl, Zyrtec, Pepcid, Benadryl gel.   Also with white \"tissue\" sticking out of the umbilicus. No drainage from the umbilicus.   Rx sent for Hydroxyzine. Will arrange for follow-up in office.   "

## 2025-06-23 ENCOUNTER — OFFICE VISIT (OUTPATIENT)
Dept: OBSTETRICS AND GYNECOLOGY | Facility: CLINIC | Age: 40
End: 2025-06-23
Payer: COMMERCIAL

## 2025-06-23 ENCOUNTER — TELEPHONE (OUTPATIENT)
Dept: OBSTETRICS AND GYNECOLOGY | Facility: CLINIC | Age: 40
End: 2025-06-23

## 2025-06-23 VITALS — WEIGHT: 179 LBS | BODY MASS INDEX: 34.96 KG/M2 | SYSTOLIC BLOOD PRESSURE: 120 MMHG | DIASTOLIC BLOOD PRESSURE: 72 MMHG

## 2025-06-23 DIAGNOSIS — L28.2 PRURITIC RASH: Primary | ICD-10-CM

## 2025-06-23 PROCEDURE — 99024 POSTOP FOLLOW-UP VISIT: CPT

## 2025-06-23 PROCEDURE — 1036F TOBACCO NON-USER: CPT

## 2025-06-23 RX ORDER — PREDNISONE 10 MG/1
TABLET ORAL
Refills: 0 | Status: CANCELLED | OUTPATIENT
Start: 2025-06-23

## 2025-06-23 RX ORDER — PREDNISONE 10 MG/1
TABLET ORAL
Qty: 10 EACH | Refills: 0 | Status: SHIPPED | OUTPATIENT
Start: 2025-06-23

## 2025-06-23 ASSESSMENT — ENCOUNTER SYMPTOMS
ANOREXIA: 1
FEVER: 0
SORE THROAT: 0
COUGH: 0
EYE PAIN: 0
VOMITING: 0
FATIGUE: 1
DIARRHEA: 0
NAIL CHANGES: 0
RHINORRHEA: 0
SHORTNESS OF BREATH: 0

## 2025-06-23 NOTE — PROGRESS NOTES
Division of Minimally Invasive Gynecologic Surgery  [facility]    Date: 06/23/2025 - Gynecology Visit    Elma Cleveland is a 40 y.o. status post robotic EOE/chromopertubation with Dr. Grider on 06/13/2025.    Patient is presenting with post op rash around lap sites. She has not been able to manage this at home, has tried Allegra, Zyrtec, benadryl PO and topical, Pepcid, and Atarax that was prescribed over the phone yesterday. Itching is extreme, it is mildly relieved by ice. She does have allergies to adhesives. Also has concerns about umbilicus, she had some bleeding, and has noticed some white tissue coming from the site.     PMHx, PSHx, SHx, Allergies, and Medications updated in Epic.    ROS: reviewed and negative    PE:  Constitutional:  No acute distress  HEENT: EOM grossly intact, MMM, neck supple and with full ROM  Pulm:  Effort normal. No accessory muscle usage.  No respiratory distress.  Abd: soft, non-distended, non-tender, no palpable masses, incisions with post op rash, areas are red/raised with inflammation. Also has some fascial tissue at the umbilicus, applied pressure to the incision with cotton tip, no fascial dehiscence noted. There is no active drainage in the office today, she states had been bleeding previously.   :  - EGBUS: grossly WNL per pt.  - Speculum: vaginal mucosa grossly WNL, no trauma or lesions, cuff intact w/o laxity   Neurological:  AAO x 3, appropriate to interview  Skin: Warm, no pallor.  Psychiatric:  normal mood and affect.    Assessment/Plan:   Elma Cleveland is a 40 y.o.  status post robotic EOE/chromopertubation with Dr. Grider on 06/13/2025.  - Will order prednisone taper for post op rash, she has Prednisone 10mg tablets at home that she is not currently taking, will order dose pack separately  - Ice packs for itching   - Monitor the incision for any more active drainage  - Will have her RTC on Wednesday 06/25 with   Marni to assess whether or not she needs additional suture at the umbilicus       Antonina Delvalle APRN-CNP  Division of Minimally Invasive Gynecologic Surgery  [facility]

## 2025-06-23 NOTE — TELEPHONE ENCOUNTER
Patient is scheduled for an appointment today 6/23/2025 a 11:45 AM with STEPHANIE Dempsey, CNP.    MARTY Gutierres

## 2025-06-23 NOTE — TELEPHONE ENCOUNTER
Patient called, spoke with on call provider over the weekend. Patient having hives/itching over abdomen and white area in umbilicus.  Patient scheduled to see Antonina Delvalle today.

## 2025-06-24 ENCOUNTER — OFFICE VISIT (OUTPATIENT)
Dept: SURGICAL ONCOLOGY | Facility: CLINIC | Age: 40
End: 2025-06-24
Payer: COMMERCIAL

## 2025-06-24 VITALS
BODY MASS INDEX: 33.79 KG/M2 | HEART RATE: 121 BPM | TEMPERATURE: 98.6 F | HEIGHT: 61 IN | SYSTOLIC BLOOD PRESSURE: 126 MMHG | DIASTOLIC BLOOD PRESSURE: 84 MMHG | WEIGHT: 179 LBS | RESPIRATION RATE: 18 BRPM

## 2025-06-24 DIAGNOSIS — Z80.3 FH: BREAST CANCER: ICD-10-CM

## 2025-06-24 DIAGNOSIS — Z80.41 FH: OVARIAN CANCER IN FIRST DEGREE RELATIVE: ICD-10-CM

## 2025-06-24 DIAGNOSIS — Z12.39 BREAST CANCER SCREENING, HIGH RISK PATIENT: ICD-10-CM

## 2025-06-24 DIAGNOSIS — Z12.31 ENCOUNTER FOR SCREENING MAMMOGRAM FOR MALIGNANT NEOPLASM OF BREAST: Primary | ICD-10-CM

## 2025-06-24 LAB
LABORATORY COMMENT REPORT: NORMAL
PATH REPORT.FINAL DX SPEC: NORMAL
PATH REPORT.GROSS SPEC: NORMAL
PATH REPORT.RELEVANT HX SPEC: NORMAL
PATH REPORT.TOTAL CANCER: NORMAL

## 2025-06-24 PROCEDURE — 99204 OFFICE O/P NEW MOD 45 MIN: CPT | Performed by: NURSE PRACTITIONER

## 2025-06-24 PROCEDURE — 99214 OFFICE O/P EST MOD 30 MIN: CPT | Performed by: NURSE PRACTITIONER

## 2025-06-24 PROCEDURE — 3008F BODY MASS INDEX DOCD: CPT | Performed by: NURSE PRACTITIONER

## 2025-06-24 PROCEDURE — 1036F TOBACCO NON-USER: CPT | Performed by: NURSE PRACTITIONER

## 2025-06-24 ASSESSMENT — PAIN SCALES - GENERAL: PAINLEVEL_OUTOF10: 4

## 2025-06-24 ASSESSMENT — ENCOUNTER SYMPTOMS
EYE PROBLEMS: 1
ARTHRALGIAS: 1

## 2025-06-25 ENCOUNTER — OFFICE VISIT (OUTPATIENT)
Dept: OBSTETRICS AND GYNECOLOGY | Facility: CLINIC | Age: 40
End: 2025-06-25
Payer: COMMERCIAL

## 2025-06-25 VITALS — WEIGHT: 179 LBS | BODY MASS INDEX: 33.36 KG/M2

## 2025-06-25 DIAGNOSIS — Z48.89 POSTOPERATIVE VISIT: Primary | ICD-10-CM

## 2025-06-25 PROCEDURE — 99024 POSTOP FOLLOW-UP VISIT: CPT | Performed by: STUDENT IN AN ORGANIZED HEALTH CARE EDUCATION/TRAINING PROGRAM

## 2025-06-25 ASSESSMENT — PAIN SCALES - GENERAL: PAINLEVEL_OUTOF10: 0-NO PAIN

## 2025-06-25 NOTE — PROGRESS NOTES
Division of Minimally Invasive Gynecologic Surgery  [facility]    Date: 06/23/2025 - Gynecology Visit    Elma Cleveland is a 40 y.o. status post robotic EOE/chromopertubation with Dr. Grider on 06/13/2025.    Patient seen and evaluated on 06/23, returning to clinic for umbilical incision evaluation by surgeon. States since starting prednisone taper for post op rash the erythema and pruritus have started to improve, continuing to use ice.    PMHx, PSHx, SHx, Allergies, and Medications updated in Epic.    ROS: reviewed and negative    PE:  Constitutional:  No acute distress  HEENT: EOM grossly intact, MMM, neck supple and with full ROM  Pulm:  Effort normal. No accessory muscle usage.  No respiratory distress.  Abd: soft, non-distended, non-tender, no palpable masses, incisions with post op rash, areas are red/raised with inflammation- improved since 2 days ago. Tissue at the umbilicus re-evaluated today by Dr. Grider.   :  - EGBUS: grossly WNL per pt.  Neurological:  AAO x 3, appropriate to interview  Skin: Warm, no pallor.  Psychiatric:  normal mood and affect.    Assessment/Plan:   Elma Cleveland is a 40 y.o.  status post robotic EOE/chromopertubation with Dr. Grider on 06/13/2025.  - Complete prednisone taper for post op rash, will consider additional dose pack if she needs it, she is scheduled to complete biologic today at home, it is okay for her to proceed from a GYN standpoint  - Ice packs for pruritis   - Umbilical incision with approx 1cm of subcutaneous fat/superficial tissue, in the process of healing, patient educated that this tissue will sloth off at some point in the coming weeks. Continue to keep area clean and dry. Discussed using dilated hydrogen peroxide to aide with healing. Would not recommend covering the incision.   - Patient does not need to RTC for additional post op check unless she has any additional questions/concerns, follow up in one  year for annual exam either with Dr. Damian or CHRISTOPHER if she is having issues with pelvic pain     Division of Minimally Invasive Gynecologic Surgery  [facility]

## 2025-06-26 ENCOUNTER — APPOINTMENT (OUTPATIENT)
Dept: OBSTETRICS AND GYNECOLOGY | Facility: CLINIC | Age: 40
End: 2025-06-26
Payer: COMMERCIAL

## 2025-06-26 ENCOUNTER — TELEPHONE (OUTPATIENT)
Facility: CLINIC | Age: 40
End: 2025-06-26
Payer: COMMERCIAL

## 2025-06-26 NOTE — TELEPHONE ENCOUNTER
Yes, if no infection, take Cimzia. If needed, she can take prednisone for the rash and or benadyl (PO or topical).

## 2025-06-26 NOTE — TELEPHONE ENCOUNTER
"PT CALLED, HAD SURGERY FOR ENDOMETRIOSIS & BROKE OUT IN A RASH FROM GLUE USED TO CLOSE INCISION. UNDER UMBILICUS INCISION HAS OPENED & SMALL AMT OF \"TISSUE\" EXPOSED. PER PT SURGEON WANTS PT TO TAKE CIMZIA as IT WILL HELP WITH ALLERGIC REACTION. PT STATES SHE DOES NOT HAVE ANY SIGNS OF INFECTION. SURGEON ASKED FOR YOUR OPINION REGARDING CIMZIA BEFORE PT TAKES IT.  "

## 2025-07-07 ENCOUNTER — TELEPHONE (OUTPATIENT)
Dept: OBSTETRICS AND GYNECOLOGY | Facility: CLINIC | Age: 40
End: 2025-07-07
Payer: COMMERCIAL

## 2025-07-07 DIAGNOSIS — T81.41XA INFECTION OF SUPERFICIAL INCISIONAL SURGICAL SITE AFTER PROCEDURE, INITIAL ENCOUNTER: Primary | ICD-10-CM

## 2025-07-07 RX ORDER — SULFAMETHOXAZOLE AND TRIMETHOPRIM 800; 160 MG/1; MG/1
1 TABLET ORAL 2 TIMES DAILY
Qty: 14 TABLET | Refills: 0 | Status: SHIPPED | OUTPATIENT
Start: 2025-07-07 | End: 2025-07-14

## 2025-07-07 NOTE — TELEPHONE ENCOUNTER
"Patient says incision is worsening \"with tissue coming out and reddened at opening\" .  She denies fever but says heart rate is 130, her normal is 90 to 100.  She has POTS.  She has been cleaning incision and Dr. Grider was aware she was having some opening of the incision.    She denies shortness of breath.  Advised if heart rate increases or persists erasmo or short of breath will need to go to emergency.    Will contact Dr. Grider, message sent.    "

## 2025-07-09 ENCOUNTER — OFFICE VISIT (OUTPATIENT)
Dept: OBSTETRICS AND GYNECOLOGY | Facility: CLINIC | Age: 40
End: 2025-07-09
Payer: COMMERCIAL

## 2025-07-09 VITALS — WEIGHT: 179 LBS | BODY MASS INDEX: 33.36 KG/M2

## 2025-07-09 DIAGNOSIS — T81.41XA INFECTION OF SUPERFICIAL INCISIONAL SURGICAL SITE AFTER PROCEDURE, INITIAL ENCOUNTER: ICD-10-CM

## 2025-07-09 DIAGNOSIS — B00.9 RECURRENT HSV (HERPES SIMPLEX VIRUS): Primary | ICD-10-CM

## 2025-07-09 PROCEDURE — 87181 SC STD AGAR DILUTION PER AGT: CPT

## 2025-07-09 PROCEDURE — 87070 CULTURE OTHR SPECIMN AEROBIC: CPT

## 2025-07-09 PROCEDURE — 87077 CULTURE AEROBIC IDENTIFY: CPT

## 2025-07-09 PROCEDURE — 87205 SMEAR GRAM STAIN: CPT

## 2025-07-09 PROCEDURE — 87075 CULTR BACTERIA EXCEPT BLOOD: CPT

## 2025-07-09 PROCEDURE — 87186 SC STD MICRODIL/AGAR DIL: CPT

## 2025-07-09 PROCEDURE — 99024 POSTOP FOLLOW-UP VISIT: CPT | Performed by: STUDENT IN AN ORGANIZED HEALTH CARE EDUCATION/TRAINING PROGRAM

## 2025-07-09 RX ORDER — VALACYCLOVIR HYDROCHLORIDE 500 MG/1
500 TABLET, FILM COATED ORAL DAILY
Qty: 90 TABLET | Refills: 2 | Status: SHIPPED | OUTPATIENT
Start: 2025-07-09 | End: 2026-01-05

## 2025-07-09 ASSESSMENT — PAIN SCALES - GENERAL: PAINLEVEL_OUTOF10: 1

## 2025-07-09 NOTE — PROGRESS NOTES
Division of Minimally Invasive Gynecologic Surgery  [facility]    Date: 7/9/2025 - Gynecology Visit    Elma Cleveland is a 40 y.o. status post robotic EOE/chromopertubation with Dr. Grider on 06/13/2025.    Pt send MyChart message due to worsening pain, erythema, and increased serous discharge at umbilical incision site. Bactrim Rx sent, she did pick this up and start this medication 2 days ago     PMHx, PSHx, SHx, Allergies, and Medications updated in Epic.    ROS: reviewed and negative    PE: Wt 81.2 kg (179 lb)   LMP 07/01/2025   BMI 33.36 kg/m²    Constitutional:  No acute distress  HEENT: EOM grossly intact, MMM, neck supple and with full ROM  Pulm:  Effort normal. No accessory muscle usage.  No respiratory distress.  Abd: soft, non-distended, non-tender, no palpable masses,umbilical incision w/ overall similar appearance to my last exam and erythema markedly improved from photo included in most recent post op picture, serous drainage noted but no purulence   Neurological:  AAO x 3, appropriate to interview  Skin: Warm, no pallor.  Psychiatric:  normal mood and affect.    Assessment/Plan:   Elma Cleveland is a 40 y.o.  status post robotic EOE/chromopertubation with Dr. Grider on 06/13/2025.  - Continue Bactrim, daily hydrogen peroxide rinses. Incision overall improving. No concerns for systemic symptoms.   - RTC in 2 weeks, earlier PRN     Faby Grider MD   Division of Minimally Invasive Gynecologic Surgery  [facility]

## 2025-07-11 ENCOUNTER — OFFICE VISIT (OUTPATIENT)
Dept: OBSTETRICS AND GYNECOLOGY | Facility: CLINIC | Age: 40
End: 2025-07-11
Payer: COMMERCIAL

## 2025-07-11 VITALS — SYSTOLIC BLOOD PRESSURE: 114 MMHG | DIASTOLIC BLOOD PRESSURE: 68 MMHG

## 2025-07-11 DIAGNOSIS — T81.49XA POSTOPERATIVE WOUND INFECTION: Primary | ICD-10-CM

## 2025-07-11 PROCEDURE — 99024 POSTOP FOLLOW-UP VISIT: CPT

## 2025-07-12 ENCOUNTER — TELEPHONE (OUTPATIENT)
Dept: OBSTETRICS AND GYNECOLOGY | Facility: CLINIC | Age: 40
End: 2025-07-12
Payer: COMMERCIAL

## 2025-07-12 DIAGNOSIS — T81.49XA POST-OPERATIVE WOUND ABSCESS: Primary | ICD-10-CM

## 2025-07-12 DIAGNOSIS — L08.9 WOUND INFECTION: ICD-10-CM

## 2025-07-12 DIAGNOSIS — T14.8XXA WOUND INFECTION: ICD-10-CM

## 2025-07-13 ENCOUNTER — HOSPITAL ENCOUNTER (EMERGENCY)
Facility: HOSPITAL | Age: 40
Discharge: HOME | End: 2025-07-13
Attending: EMERGENCY MEDICINE
Payer: COMMERCIAL

## 2025-07-13 ENCOUNTER — CLINICAL SUPPORT (OUTPATIENT)
Dept: EMERGENCY MEDICINE | Facility: HOSPITAL | Age: 40
End: 2025-07-13
Payer: COMMERCIAL

## 2025-07-13 ENCOUNTER — TELEPHONE (OUTPATIENT)
Dept: OBSTETRICS AND GYNECOLOGY | Facility: CLINIC | Age: 40
End: 2025-07-13
Payer: COMMERCIAL

## 2025-07-13 VITALS
DIASTOLIC BLOOD PRESSURE: 78 MMHG | OXYGEN SATURATION: 99 % | RESPIRATION RATE: 16 BRPM | SYSTOLIC BLOOD PRESSURE: 122 MMHG | HEART RATE: 93 BPM | WEIGHT: 179 LBS | BODY MASS INDEX: 35.14 KG/M2 | TEMPERATURE: 98.1 F | HEIGHT: 60 IN

## 2025-07-13 LAB
ALBUMIN SERPL BCP-MCNC: 4.2 G/DL (ref 3.4–5)
ALP SERPL-CCNC: 55 U/L (ref 33–110)
ALT SERPL W P-5'-P-CCNC: 22 U/L (ref 7–45)
ANION GAP SERPL CALC-SCNC: 8 MMOL/L (ref 10–20)
APPEARANCE UR: CLEAR
AST SERPL W P-5'-P-CCNC: 19 U/L (ref 9–39)
ATRIAL RATE: 107 BPM
BACTERIA SPEC CULT: ABNORMAL
BACTERIA SPEC CULT: ABNORMAL
BASOPHILS # BLD AUTO: 0.05 X10*3/UL (ref 0–0.1)
BASOPHILS NFR BLD AUTO: 1 %
BILIRUB SERPL-MCNC: 0.4 MG/DL (ref 0–1.2)
BILIRUB UR STRIP.AUTO-MCNC: NEGATIVE MG/DL
BUN SERPL-MCNC: 13 MG/DL (ref 6–23)
CALCIUM SERPL-MCNC: 9.3 MG/DL (ref 8.6–10.6)
CHLORIDE SERPL-SCNC: 105 MMOL/L (ref 98–107)
CO2 SERPL-SCNC: 30 MMOL/L (ref 21–32)
COLOR UR: NORMAL
CREAT SERPL-MCNC: 0.89 MG/DL (ref 0.5–1.05)
EGFRCR SERPLBLD CKD-EPI 2021: 85 ML/MIN/1.73M*2
EOSINOPHIL # BLD AUTO: 0 X10*3/UL (ref 0–0.7)
EOSINOPHIL NFR BLD AUTO: 0 %
ERYTHROCYTE [DISTWIDTH] IN BLOOD BY AUTOMATED COUNT: 12.6 % (ref 11.5–14.5)
GLUCOSE SERPL-MCNC: 93 MG/DL (ref 74–99)
GLUCOSE UR STRIP.AUTO-MCNC: NORMAL MG/DL
GRAM STN SPEC: ABNORMAL
GRAM STN SPEC: ABNORMAL
HCT VFR BLD AUTO: 37.4 % (ref 36–46)
HGB BLD-MCNC: 13 G/DL (ref 12–16)
IMM GRANULOCYTES # BLD AUTO: 0.02 X10*3/UL (ref 0–0.7)
IMM GRANULOCYTES NFR BLD AUTO: 0.4 % (ref 0–0.9)
KETONES UR STRIP.AUTO-MCNC: NEGATIVE MG/DL
LACTATE SERPL-SCNC: 1.2 MMOL/L (ref 0.4–2)
LEUKOCYTE ESTERASE UR QL STRIP.AUTO: NEGATIVE
LYMPHOCYTES # BLD AUTO: 2.25 X10*3/UL (ref 1.2–4.8)
LYMPHOCYTES NFR BLD AUTO: 44.5 %
MCH RBC QN AUTO: 31 PG (ref 26–34)
MCHC RBC AUTO-ENTMCNC: 34.8 G/DL (ref 32–36)
MCV RBC AUTO: 89 FL (ref 80–100)
MONOCYTES # BLD AUTO: 0.43 X10*3/UL (ref 0.1–1)
MONOCYTES NFR BLD AUTO: 8.5 %
NEUTROPHILS # BLD AUTO: 2.31 X10*3/UL (ref 1.2–7.7)
NEUTROPHILS NFR BLD AUTO: 45.6 %
NITRITE UR QL STRIP.AUTO: NEGATIVE
NRBC BLD-RTO: 0 /100 WBCS (ref 0–0)
P AXIS: 55 DEGREES
P OFFSET: 198 MS
P ONSET: 160 MS
PH UR STRIP.AUTO: 6 [PH]
PLATELET # BLD AUTO: 233 X10*3/UL (ref 150–450)
POTASSIUM SERPL-SCNC: 3.8 MMOL/L (ref 3.5–5.3)
PR INTERVAL: 118 MS
PROT SERPL-MCNC: 6.7 G/DL (ref 6.4–8.2)
PROT UR STRIP.AUTO-MCNC: NEGATIVE MG/DL
Q ONSET: 219 MS
QRS COUNT: 17 BEATS
QRS DURATION: 72 MS
QT INTERVAL: 322 MS
QTC CALCULATION(BAZETT): 429 MS
QTC FREDERICIA: 390 MS
R AXIS: 27 DEGREES
RBC # BLD AUTO: 4.2 X10*6/UL (ref 4–5.2)
RBC # UR STRIP.AUTO: NEGATIVE MG/DL
RBC MORPH BLD: NORMAL
SODIUM SERPL-SCNC: 139 MMOL/L (ref 136–145)
SP GR UR STRIP.AUTO: 1.01
T AXIS: 15 DEGREES
T OFFSET: 380 MS
UROBILINOGEN UR STRIP.AUTO-MCNC: NORMAL MG/DL
VENTRICULAR RATE: 107 BPM
WBC # BLD AUTO: 5.1 X10*3/UL (ref 4.4–11.3)

## 2025-07-13 PROCEDURE — 81003 URINALYSIS AUTO W/O SCOPE: CPT | Performed by: EMERGENCY MEDICINE

## 2025-07-13 PROCEDURE — 80053 COMPREHEN METABOLIC PANEL: CPT | Performed by: EMERGENCY MEDICINE

## 2025-07-13 PROCEDURE — 83605 ASSAY OF LACTIC ACID: CPT | Performed by: EMERGENCY MEDICINE

## 2025-07-13 PROCEDURE — 93005 ELECTROCARDIOGRAM TRACING: CPT

## 2025-07-13 PROCEDURE — 36415 COLL VENOUS BLD VENIPUNCTURE: CPT | Performed by: EMERGENCY MEDICINE

## 2025-07-13 PROCEDURE — 99284 EMERGENCY DEPT VISIT MOD MDM: CPT | Performed by: EMERGENCY MEDICINE

## 2025-07-13 PROCEDURE — 99285 EMERGENCY DEPT VISIT HI MDM: CPT | Performed by: EMERGENCY MEDICINE

## 2025-07-13 PROCEDURE — 85025 COMPLETE CBC W/AUTO DIFF WBC: CPT | Performed by: EMERGENCY MEDICINE

## 2025-07-13 PROCEDURE — 2500000001 HC RX 250 WO HCPCS SELF ADMINISTERED DRUGS (ALT 637 FOR MEDICARE OP)

## 2025-07-13 RX ORDER — AMOXICILLIN AND CLAVULANATE POTASSIUM 500; 125 MG/1; MG/1
1 TABLET, FILM COATED ORAL ONCE
Status: COMPLETED | OUTPATIENT
Start: 2025-07-13 | End: 2025-07-13

## 2025-07-13 RX ORDER — AMOXICILLIN AND CLAVULANATE POTASSIUM 500; 125 MG/1; MG/1
1 TABLET, FILM COATED ORAL 3 TIMES DAILY
Qty: 30 TABLET | Refills: 0 | Status: SHIPPED | OUTPATIENT
Start: 2025-07-13 | End: 2025-07-23

## 2025-07-13 RX ADMIN — AMOXICILLIN AND CLAVULANATE POTASSIUM 1 TABLET: 500; 125 TABLET, FILM COATED ORAL at 21:01

## 2025-07-13 ASSESSMENT — PAIN DESCRIPTION - LOCATION: LOCATION: GENERALIZED

## 2025-07-13 ASSESSMENT — PAIN - FUNCTIONAL ASSESSMENT: PAIN_FUNCTIONAL_ASSESSMENT: 0-10

## 2025-07-13 ASSESSMENT — PAIN SCALES - GENERAL: PAINLEVEL_OUTOF10: 3

## 2025-07-13 NOTE — ED TRIAGE NOTES
"Pt had endometriosis surgery on 6/13, was on atb for infection, cultures came back positive for a \"rare bug\" that is atb resistance. Was told to come in for c/o sepsis    "

## 2025-07-13 NOTE — TELEPHONE ENCOUNTER
Patient called on call pager complaining of worsening abdominal pain, tachycardia, dizziness. She had a laparoscopic excision of endometriosis about 1 month ago. She is feeling very unwell. She is on bactrim for a wound infection.     I recommend coming in through the ER for further evaluation

## 2025-07-13 NOTE — ED PROVIDER NOTES
Emergency Department Provider Note       History of Present Illness     History provided by: Patient  Limitations to History: None  External Records Reviewed with Brief Summary: Outpatient progress note from 7/09/2025 which showed bactrim Rx for suspected wound site infection.  Outpatient progress note from 6/30/2025 which detailed discussion involving erythema and pruritus around umbilical wound site.    HPI:  Elma Cleveland is a 40 y.o. female with pertinent past history of Guzman, IBS, and endometriosis s/p laparoscopic endometriosis excision on 6/13/2025 presenting to the ED for discharge from her wound site. Patient said symptoms began 10 days ago along with redness at her wound site. She describes the discharge as yellow-gray and constant.  Patient says she also has felt headaches and dizziness since the onset of symptoms, as well as tachycardia although she is unsure if this is related to Guzman. She denies fever/chills, shortness of breath, chest pain, abdominal pain, nausea/vomiting, dysuria, changes in bowel habits or hematochezia. Patient states she was seen in the OBGYN office about a week ago and put on bactrim, however her culture came back positive and indicated the infection was resistant to bactrim. Due to the persistence of symptoms and antibiotic resistance patient presents to the ED looking for further workup and a change to her medications.     Physical Exam   Triage vitals:  T 36.7 °C (98.1 °F)  HR (!) 107  /86  RR 16  O2 98 % None (Room air)    General: Awake, alert, in no acute distress  Eyes: Gaze conjugate.  No scleral icterus or injection  HENT: Normo-cephalic, atraumatic. No stridor  CV: Regular rate, regular rhythm. Radial pulses 2+ bilaterally  Resp: Breathing non-labored, speaking in full sentences.  Clear to auscultation bilaterally  GI: Soft, non-distended, non-tender. No rebound or guarding. Erythema surrounding laparoscopic wound site inside umbilicus. Wound-sit open  with visible connective tissue. Small amount of yellowish discharge when pressure applied to wound site. Normal LLQ and RLQ laparoscopic wounds without erythema, discharge  MSK/Extremities: No gross bony deformities. Moving all extremities  Skin: Warm. Appropriate color  Neuro: Alert. Oriented. Face symmetric. Speech is fluent.  Psych: Appropriate mood and affect      Medical Decision Making & ED Course   Medical Decision Makin y.o. female with pertinent medical history of laparoscopic endometriosis excision on 2025 presenting to the ED with wound site infection.  Symptoms began 10 days ago, patient seen in outpatient OB and prescribed Bactrim/cultures drawn.  Over the last week symptoms have persisted including lightheadedness, headache, and heart palpitations as well as discharge yellow/pale in color from the wound site.  Cultures came back positive and showed resistance to Bactrim, patient called OB earlier regarding these results and was advised to come to the ED.  In the ED patient was hemodynamically stable, afebrile and showed no obvious signs of systemic infection. On physical exam heart rate and rhythm was normal, abdomen was soft and nontender to palpation.  Wound site at the umbilicus showed slight erythema some connective tissue at the center that was noted and followed by OB starting in  and yellow/pale discharge.     Workup showed no signs of systemic infection or UTI as commented on in the ED course.  Patient's lack of cardiac symptoms as well as benign EKG causes me to have low suspicion for ACS.  Patient's physical exam including an abdomen that was soft to palpation with no rebound or tenderness makes my suspicion of an abscess low.  Based on culture results and patient HPI/workup plan for discharge with Augmentin and recommendation for outpatient CT scan.  Ran the plan by OB and waiting for callback for final say and dispo. OB stated patient was clear for discharge with Rx for  Augmentin, advised to apply hydrogen peroxide to exposed tissue at wound site, and suggested follow-up with her OBGYN outpatient. Patient advised on plan and instructed to return to ED if symptoms worsen including worsening abdominal pain, discharge, and systemic symptoms like fever/chills.     ----    Differential diagnoses considered include but are not limited to: Wound site Infection vs abscess vs bacteremia vs ACS vs     Social Determinants of Health which Significantly Impact Care: Social Determinants of Health which Significantly Impact Care: None identified    EKG Independent Interpretation:  The EKG obtained 07/13 1700 and was interpreted by myself. EKG indicated due to sinus tachycardia on arrival and symptoms of lightheadedness. It demonstrates sinus tachycardia with a ventricular rate of 107 bpm. Intervals: TN 118ms, Qt 322. Current EKG compared to prior EKG from 10 Dec 2024 with no appreciable change     Independent Result Review and Interpretation: Relevant laboratory and radiographic results were reviewed and independently interpreted by myself.  As necessary, they are commented on in the ED Course.    Chronic conditions affecting the patient's care: As documented above in [facility]    The patient was discussed with the following consultants/services:   Patient was discussed with OB-GYN resident Dr. Nixon who recommended discharge with augmentin and follow-up CT scan as outpatient.    Care Considerations: As documented above in [facility]    ED Course:  ED Course as of 07/13/25 2041   Sun Jul 13, 2025   1646 CBC with Differential  Wnl     [DS]   1647 Comprehensive Metabolic Panel(!)  unremarkable [DS]   1647 Lactate  Unremarkable   [DS]   1647 Labs do not suggest systemic infection [DS]   1805 ATTENDING ATTESTATION  40-year-old female with medical history as mentioned above including endometriosis status post laparoscopic approach for endometriosis excision presenting to the emergency department with complaint of  positive wound culture.  The patient states that over the past 10 days she has had some global malaise, no fevers, has been following up outpatient with her OB and has had her periumbilical incision assessed multiple times.  Apparently the trocar point was left open, they were watching and letting it heal on its own.  Outpatient culture grew E faecalis that was resistant to the Bactrim she was initially on.  On arrival here the patient is hemodynamically stable demonstrates no tachycardia on repeat examination she has a soft abdomen no guarding or rigidity no leukocytosis or left shift no evidence of cellulitis.  It is curious that it grew E faecalis, the patient does not have any exposure to farms she actually works as a nurse.  I very much doubt a perforation or a fistula formation based off the benign exam at the bedside certainly based off the timing of the procedure which was about a month ago certainly would have manifested at this point.  She has an outpatient CT ordered, I engaged in shared decision making with the patient she prefers to get this CT outpatient I think it is reasonable she is a nurse and reliable she will return with any worsening symptoms any infectious symptoms or any increasing pain.  We did take a picture placed in the media tab have had OB review the wound I do anticipate home management with close follow-up instructions.  [facility] [RH]   1821 Urinalysis with Reflex Culture and Microscopic  UA Negative [DS]   1953 ECG 12 lead  Comparable to previous  [DS]      ED Course User Index  [DS] Good Cerda MD  [RH] Gucci Louis DO         Diagnoses as of 07/13/25 2041   Infection of obstetric surgical wound, superficial incisional site ([facility])       Disposition   As a result of the work-up, the patient was discharged home.  She was informed of her diagnosis and instructed to come back with any concerns or worsening of condition. She and was agreeable to the plan as discussed above. She was  given the opportunity to ask questions. All of the patient's questions were answered.    Procedures   Procedures    Patient seen and discussed with ED attending physician.    Good Cerda MD  Emergency Medicine                                                       Good Cerda MD  Resident  07/13/25 9033

## 2025-07-14 ENCOUNTER — HOSPITAL ENCOUNTER (OUTPATIENT)
Dept: RADIOLOGY | Facility: CLINIC | Age: 40
Discharge: HOME | End: 2025-07-14
Payer: COMMERCIAL

## 2025-07-14 ENCOUNTER — PATIENT OUTREACH (OUTPATIENT)
Dept: CARE COORDINATION | Facility: CLINIC | Age: 40
End: 2025-07-14
Payer: COMMERCIAL

## 2025-07-14 DIAGNOSIS — L08.9 WOUND INFECTION: ICD-10-CM

## 2025-07-14 DIAGNOSIS — T81.49XA POST-OPERATIVE WOUND ABSCESS: ICD-10-CM

## 2025-07-14 DIAGNOSIS — T14.8XXA WOUND INFECTION: ICD-10-CM

## 2025-07-14 LAB
HOLD SPECIMEN: NORMAL
HOLD SPECIMEN: NORMAL

## 2025-07-14 PROCEDURE — 74176 CT ABD & PELVIS W/O CONTRAST: CPT

## 2025-07-14 PROCEDURE — 74176 CT ABD & PELVIS W/O CONTRAST: CPT | Performed by: STUDENT IN AN ORGANIZED HEALTH CARE EDUCATION/TRAINING PROGRAM

## 2025-07-14 NOTE — DISCHARGE INSTRUCTIONS
Take Augmentin (amoxicillin-clavulanate) 500-125mg tablet once in the morning and once in the evening (every 12hrs). Take with food to avoid GI upset.     Apply hydrogen peroxide to exposed tissue in wound site twice a day.     Follow up outpatient with your OBGYN (Dr. Castelan)    Return to emergency department if condition deteriorates or symptoms worsen; including additional discharge, fevers/chills, nausea/vomiting, or worsening abdominal pain

## 2025-07-14 NOTE — PROGRESS NOTES
Division of Minimally Invasive Gynecologic Surgery  [facility]    Date: 07/11/2025 - Gynecology Visit    Elma Cleveland is a 40 y.o. status post robotic EOE/chromopertubation with Dr. Grider on 06/13/2025.    Patient representing for evaluation of post op wound infection. She was treated for post op rash with prednisone taper approximately one week post op. The rash did resolve, however she developed increased discharge and erythema near umbilical incision which has evaluated in the office earlier this week. Prescription for Bactrim was started on Monday due symptoms she was experiencing at home. Seen in the office by Dr. Grider on Wednesday 07/09, wound cultures were sent.    Today, patient states she does not believe her infection is improving, appears to be getting worse. Continues to drain with erythema. She does have about 2cm subcutaneous fat that is still healing at the umbilicus that was previously evaluated in the clinic.    PMHx, PSHx, SHx, Allergies, and Medications updated in Epic.    ROS: reviewed and negative    PE:  Constitutional:  No acute distress  HEENT: EOM grossly intact, MMM, neck supple and with full ROM  Pulm:  Effort normal. No accessory muscle usage.  No respiratory distress.  Abd: soft, non-distended, non-tender, no palpable masses, abdominal lap sites CDI with the exception of the umbilical. Umbilical with erythema, abdomen thoroughly examined, non-tender throughout, overall benign, no concern for abscess on today's exam.    - EGBUS: grossly WNL per pt.  Neurological:  AAO x 3, appropriate to interview  Skin: Warm, no pallor.  Psychiatric:  normal mood and affect.    Assessment/Plan:   Elma Cleveland is a 40 y.o.  status post robotic EOE/chromopertubation with Dr. Grider on 06/13/2025.  - Will follow up the sensitivities of the wound culture, suspect resistance to bactrim due to clinical symptoms and Uptodate information on Enterococcus  Faecalis. Discussed preliminary findings with the lab, results will be available in the next 1-2 days.  - continue to keep incision clean and dry, monitor for worsening s/sx of infection   - Will consider CT A/P if increased suspicion for abdominal/pelvic abscess   - Follow up with Dr. Grider, scheduled for 7/23    STEPHANIE Dempsey-CNP  Division of Minimally Invasive Gynecologic Surgery  [facility]

## 2025-07-14 NOTE — PROGRESS NOTES
Reviewed chart prior to patient outreach. Outreach call to patient to support a smooth transition of care from recent ED visit.    Spoke with patient, reviewed discharge medications, discharge instructions, assessed social needs, and provided education on importance of follow-up appointment with provider.     See  Hospital Encounter and Summary, and Discharge assessment below for further details. Information obtained from discharge summary from EMR.    ED Note:  HPI:  Elma Cleveland is a 40 y.o. female with pertinent past history of Guzman, IBS, and endometriosis s/p laparoscopic endometriosis excision on 2025 presenting to the ED for discharge from her wound site. Patient said symptoms began 10 days ago along with redness at her wound site. She describes the discharge as yellow-gray and constant.  Patient says she also has felt headaches and dizziness since the onset of symptoms, as well as tachycardia although she is unsure if this is related to Guzman. She denies fever/chills, shortness of breath, chest pain, abdominal pain, nausea/vomiting, dysuria, changes in bowel habits or hematochezia. Patient states she was seen in the OBGYN office about a week ago and put on bactrim, however her culture came back positive and indicated the infection was resistant to bactrim. Due to the persistence of symptoms and antibiotic resistance patient presents to the ED looking for further workup and a change to her medications.   Medical Decision Makin y.o. female with pertinent medical history of laparoscopic endometriosis excision on 2025 presenting to the ED with wound site infection.  Symptoms began 10 days ago, patient seen in outpatient OB and prescribed Bactrim/cultures drawn.  Over the last week symptoms have persisted including lightheadedness, headache, and heart palpitations as well as discharge yellow/pale in color from the wound site.  Cultures came back positive and showed resistance to Bactrim,  patient called OB earlier regarding these results and was advised to come to the ED.  In the ED patient was hemodynamically stable, afebrile and showed no obvious signs of systemic infection. On physical exam heart rate and rhythm was normal, abdomen was soft and nontender to palpation.  Wound site at the umbilicus showed slight erythema some connective tissue at the center that was noted and followed by OB starting in June and yellow/pale discharge.      Workup showed no signs of systemic infection or UTI as commented on in the ED course.  Patient's lack of cardiac symptoms as well as benign EKG causes me to have low suspicion for ACS.  Patient's physical exam including an abdomen that was soft to palpation with no rebound or tenderness makes my suspicion of an abscess low.  Based on culture results and patient HPI/workup plan for discharge with Augmentin and recommendation for outpatient CT scan.  Ran the plan by OB and waiting for callback for final say and dispo. OB stated patient was clear for discharge with Rx for Augmentin, advised to apply hydrogen peroxide to exposed tissue at wound site, and suggested follow-up with her OBGYN outpatient. Patient advised on plan and instructed to return to ED if symptoms worsen including worsening abdominal pain, discharge, and systemic symptoms like fever/chills.     CM Note:  Patient reports that her wound looks worse today. No change in headaches, dizziness, or tachycardia. Patient is waiting for CT abdomen/pelvis at time of outreach. She picked up Augmentin earlier today. She has been cleaning wound with hydrogen peroxide as instructed. Patient has a post-op appointment scheduled on 7/23/25.  No other questions or concerns identified at this time. Patient will reach out as needed with any non-urgent questions or concerns. Provided patient with my contact information for potential needs.    Marni Cano RN BSN  ACO Care Manager  [phone]

## 2025-07-14 NOTE — SIGNIFICANT EVENT
Gynecology Note  ED called gynecology team for sonia to OK this patient for discharge.     39yo POD30 from robotic excision of endometriosis and chromopertubation presenting with umbilical wound infection not improving with antibiotics. Patient called into the office one week ago with drainage from umbilical wound and surrounding erythema. She was started on Bactrim. She was seen in the office on 7/9 by Dr. Grider who swabbed the wound for culture. Patient presenting today for intermittent tachycardia at home, lightheadedness, and wound infection not improving.    In ED, VSS, no leukocytosis. Culture noted to be growing enterococcus faecalis resistant to Bactrim.     GENERAL: Examination reveals a well developed, well nourished, female in no acute distress. She is alert and cooperative.  HEENT: External ears normal. Nose normal, no erythema or discharge. Mouth and throat clear.  NECK: supple, no significant adenopathy  LUNGS: Normal respiratory effort  HEART: RRR  ABDOMEN: Umbilical incision with white granulation tissue and very minimal surrounding erythema, no induration or drainage  NEUROLOGICAL: alert, oriented, normal speech, no focal findings or movement disorder noted  PSYCHOLOGICAL: awake and alert; oriented to person, place, and time    Plan:  - Agree that given VSS, labs wnl, and benign exam, OK for outpatient CT abd/pelvis and stable for discharge  - Strict return precautions provided (worsening abd pain, increased drainage, N/V)  - Recommend following up with Dr. Grider's office later this week  - Recommend covering incision with cotton ball soaked in small amount of hydrogen peroxide to facilitate break down of granulation tissue    Discussed with Dr. Shaheen Nixon MD PGY-3  Obstetrics & Gynecology

## 2025-07-15 ENCOUNTER — APPOINTMENT (OUTPATIENT)
Dept: OBSTETRICS AND GYNECOLOGY | Facility: CLINIC | Age: 40
End: 2025-07-15
Payer: COMMERCIAL

## 2025-07-15 ENCOUNTER — SPECIALTY PHARMACY (OUTPATIENT)
Dept: PHARMACY | Facility: CLINIC | Age: 40
End: 2025-07-15

## 2025-07-15 ENCOUNTER — PHARMACY VISIT (OUTPATIENT)
Dept: PHARMACY | Facility: CLINIC | Age: 40
End: 2025-07-15
Payer: COMMERCIAL

## 2025-07-15 PROCEDURE — RXMED WILLOW AMBULATORY MEDICATION CHARGE

## 2025-07-15 NOTE — TELEPHONE ENCOUNTER
Result Communication    Results were successfully communicated with the patient and they acknowledged their understanding.    Follow up with CT scan, antibiotics transitioned to Augmentin TID x 10 days

## 2025-07-17 ENCOUNTER — APPOINTMENT (OUTPATIENT)
Dept: OBSTETRICS AND GYNECOLOGY | Facility: CLINIC | Age: 40
End: 2025-07-17
Payer: COMMERCIAL

## 2025-07-23 ENCOUNTER — APPOINTMENT (OUTPATIENT)
Dept: OBSTETRICS AND GYNECOLOGY | Facility: CLINIC | Age: 40
End: 2025-07-23
Payer: COMMERCIAL

## 2025-07-23 VITALS — WEIGHT: 179 LBS | DIASTOLIC BLOOD PRESSURE: 66 MMHG | BODY MASS INDEX: 34.96 KG/M2 | SYSTOLIC BLOOD PRESSURE: 108 MMHG

## 2025-07-23 DIAGNOSIS — Z48.89 POSTOPERATIVE VISIT: Primary | ICD-10-CM

## 2025-07-23 PROCEDURE — 1036F TOBACCO NON-USER: CPT | Performed by: STUDENT IN AN ORGANIZED HEALTH CARE EDUCATION/TRAINING PROGRAM

## 2025-07-23 PROCEDURE — 99024 POSTOP FOLLOW-UP VISIT: CPT | Performed by: STUDENT IN AN ORGANIZED HEALTH CARE EDUCATION/TRAINING PROGRAM

## 2025-07-23 NOTE — PROGRESS NOTES
Division of Minimally Invasive Gynecologic Surgery  [facility]    Date: 7/23/2025 - Gynecology Visit    Elma Cleveland is a 40 y.o. status post robotic EOE/chromopertubation with Dr. Grider on 06/13/2025.    Presents today for wound check, reports it is overall doing better     PMHx, PSHx, SHx, Allergies, and Medications updated in Epic.    ROS: reviewed and negative    PE: LMP 07/01/2025    Constitutional:  No acute distress  HEENT: EOM grossly intact, MMM, neck supple and with full ROM  Pulm:  Effort normal. No accessory muscle usage.  No respiratory distress.  Abd: umbilical incision w/ significantly less erythema and edema, loose strip of tissue now resolved   Neurological:  AAO x 3, appropriate to interview  Skin: Warm, no pallor.  Psychiatric:  normal mood and affect.    Assessment/Plan:   Elma Cleveland is a 40 y.o.  status post robotic EOE/chromopertubation with Dr. Grider on 06/13/2025.  - Healing of incision improving. Complete course of Augmentin, continue daily washing  - Wet to dry dressings     Faby Grider MD   Division of Minimally Invasive Gynecologic Surgery  [facility]

## 2025-07-24 ENCOUNTER — TELEPHONE (OUTPATIENT)
Dept: OBSTETRICS AND GYNECOLOGY | Facility: CLINIC | Age: 40
End: 2025-07-24
Payer: COMMERCIAL

## 2025-07-24 ENCOUNTER — TELEPHONE (OUTPATIENT)
Facility: CLINIC | Age: 40
End: 2025-07-24
Payer: COMMERCIAL

## 2025-07-24 NOTE — TELEPHONE ENCOUNTER
I spoke to Elma and told her Dr. Grider says she will talk to her rheumatologist.  Elma says her Cimzia is due tomorrow.    That is the medication she takes but rheumatologist wanted to discuss with surgeon prior to restarting.

## 2025-07-24 NOTE — TELEPHONE ENCOUNTER
"PT LEFT VM, STATES SHE \"ENDED UP\" WITH INFECTION & FINISHED ABX YESTERDAY; STATES SHE HAS AN UMBILICAL WOUND & IT IS STILL OPEN. ASKING IF SHE CAN TAKE HER CIMZIA. PT HAD ROBOTIC ENDOMETRIOSIS SURGERY. DISCUSSED WITH DR APPLE & LEFT  FOR PT THAT DR APPLE WANTS OB/GYN OPINION ON WHEN TO RE-START CIMZIA. DR MISTRY WANTS PT TO WAIT AT LEAST 1 MORE WEEK.   "

## 2025-07-24 NOTE — TELEPHONE ENCOUNTER
Pt would like a nurse or dr. Grider to call her back regarding infection and medication from Rheumatologist. [phone]

## 2025-07-30 LAB
25(OH)D3+25(OH)D2 SERPL-MCNC: 33 NG/ML (ref 30–100)
ALBUMIN SERPL-MCNC: 4.3 G/DL (ref 3.6–5.1)
ALBUMIN/CREAT UR: NORMAL MG/G CREAT
ALP SERPL-CCNC: 56 U/L (ref 31–125)
ALT SERPL-CCNC: 25 U/L (ref 6–29)
ANION GAP SERPL CALCULATED.4IONS-SCNC: 9 MMOL/L (CALC) (ref 7–17)
APPEARANCE UR: CLEAR
AST SERPL-CCNC: 26 U/L (ref 10–30)
BACTERIA #/AREA URNS HPF: NORMAL /HPF
BACTERIA UR CULT: NORMAL
BASOPHILS # BLD AUTO: 41 CELLS/UL (ref 0–200)
BASOPHILS NFR BLD AUTO: 0.9 %
BILIRUB SERPL-MCNC: 0.4 MG/DL (ref 0.2–1.2)
BILIRUB UR QL STRIP: NEGATIVE
BUN SERPL-MCNC: 13 MG/DL (ref 7–25)
C1Q SERPL-MCNC: NORMAL UG/ML
C3 SERPL-MCNC: 140 MG/DL (ref 83–193)
C4 SERPL-MCNC: 19 MG/DL (ref 15–57)
CALCIUM SERPL-MCNC: 9.3 MG/DL (ref 8.6–10.2)
CHLORIDE SERPL-SCNC: 104 MMOL/L (ref 98–110)
CK SERPL-CCNC: 54 U/L (ref 20–239)
CO2 SERPL-SCNC: 25 MMOL/L (ref 20–32)
COLOR UR: YELLOW
CREAT SERPL-MCNC: 0.68 MG/DL (ref 0.5–0.97)
CREAT UR-MCNC: 82 MG/DL (ref 20–275)
CRP SERPL-MCNC: 4.8 MG/L
DSDNA AB SER-ACNC: 11 IU/ML
EGFRCR SERPLBLD CKD-EPI 2021: 114 ML/MIN/1.73M2
EOSINOPHIL # BLD AUTO: 9 CELLS/UL (ref 15–500)
EOSINOPHIL NFR BLD AUTO: 0.2 %
ERYTHROCYTE [DISTWIDTH] IN BLOOD BY AUTOMATED COUNT: 13.3 % (ref 11–15)
ERYTHROCYTE [SEDIMENTATION RATE] IN BLOOD BY WESTERGREN METHOD: 9 MM/H
GLUCOSE SERPL-MCNC: 75 MG/DL (ref 65–99)
GLUCOSE UR QL STRIP: NEGATIVE
HCT VFR BLD AUTO: 40.8 % (ref 35–45)
HGB BLD-MCNC: 12.8 G/DL (ref 11.7–15.5)
HGB UR QL STRIP: NEGATIVE
HYALINE CASTS #/AREA URNS LPF: NORMAL /LPF
KETONES UR QL STRIP: NEGATIVE
LEUKOCYTE ESTERASE UR QL STRIP: NEGATIVE
LYMPHOCYTES # BLD AUTO: 1766 CELLS/UL (ref 850–3900)
LYMPHOCYTES NFR BLD AUTO: 38.4 %
MCH RBC QN AUTO: 30 PG (ref 27–33)
MCHC RBC AUTO-ENTMCNC: 31.4 G/DL (ref 32–36)
MCV RBC AUTO: 95.6 FL (ref 80–100)
MICROALBUMIN UR-MCNC: <0.2 MG/DL
MONOCYTES # BLD AUTO: 460 CELLS/UL (ref 200–950)
MONOCYTES NFR BLD AUTO: 10 %
NEUTROPHILS # BLD AUTO: 2323 CELLS/UL (ref 1500–7800)
NEUTROPHILS NFR BLD AUTO: 50.5 %
NITRITE UR QL STRIP: NEGATIVE
PH UR STRIP: 5.5 [PH] (ref 5–8)
PLATELET # BLD AUTO: 273 THOUSAND/UL (ref 140–400)
PMV BLD REES-ECKER: 11.1 FL (ref 7.5–12.5)
POTASSIUM SERPL-SCNC: 4.2 MMOL/L (ref 3.5–5.3)
PROT SERPL-MCNC: 6.5 G/DL (ref 6.1–8.1)
PROT UR QL STRIP: NEGATIVE
RBC # BLD AUTO: 4.27 MILLION/UL (ref 3.8–5.1)
RBC #/AREA URNS HPF: NORMAL /HPF
SERVICE CMNT-IMP: NORMAL
SODIUM SERPL-SCNC: 138 MMOL/L (ref 135–146)
SP GR UR STRIP: 1.01 (ref 1–1.03)
SQUAMOUS #/AREA URNS HPF: NORMAL /HPF
T3 SERPL-MCNC: 140 NG/DL (ref 76–181)
T3FREE SERPL-MCNC: 4.3 PG/ML (ref 2.3–4.2)
T4 FREE SERPL-MCNC: 1.9 NG/DL (ref 0.8–1.8)
TSH SERPL-ACNC: 0.81 MIU/L
WBC # BLD AUTO: 4.6 THOUSAND/UL (ref 3.8–10.8)
WBC #/AREA URNS HPF: NORMAL /HPF

## 2025-08-04 ENCOUNTER — DOCUMENTATION (OUTPATIENT)
Dept: PHYSICAL THERAPY | Facility: CLINIC | Age: 40
End: 2025-08-04
Payer: COMMERCIAL

## 2025-08-04 NOTE — PROGRESS NOTES
Physical Therapy    Discharge Summary    Name: Elma Cleveland  MRN: [id number]  : 1985  Date: 25    Discharge Summary: PT    Discharge Information: Date of discharge 25, Date of last visit 25, Date of evaluation 25, and Number of attended visits 3    Therapy Summary: Continued to have pelvic pain with intercourse.  Pain at OI and deep er pelvic floor muscles of levator ani R>L.  Patient stopped attending therapy.      Discharge Status: Partially achieved all goals    Rehab Discharge Reason: Failed to schedule and/or keep follow-up appointment(s)

## 2025-08-06 LAB
25(OH)D3+25(OH)D2 SERPL-MCNC: 33 NG/ML (ref 30–100)
ALBUMIN SERPL-MCNC: 4.3 G/DL (ref 3.6–5.1)
ALBUMIN/CREAT UR: NORMAL MG/G CREAT
ALP SERPL-CCNC: 56 U/L (ref 31–125)
ALT SERPL-CCNC: 25 U/L (ref 6–29)
ANION GAP SERPL CALCULATED.4IONS-SCNC: 9 MMOL/L (CALC) (ref 7–17)
APPEARANCE UR: CLEAR
AST SERPL-CCNC: 26 U/L (ref 10–30)
BACTERIA #/AREA URNS HPF: NORMAL /HPF
BACTERIA UR CULT: NORMAL
BASOPHILS # BLD AUTO: 41 CELLS/UL (ref 0–200)
BASOPHILS NFR BLD AUTO: 0.9 %
BILIRUB SERPL-MCNC: 0.4 MG/DL (ref 0.2–1.2)
BILIRUB UR QL STRIP: NEGATIVE
BUN SERPL-MCNC: 13 MG/DL (ref 7–25)
C1Q SERPL-MCNC: 7 MG/DL (ref 5–8.6)
C3 SERPL-MCNC: 140 MG/DL (ref 83–193)
C4 SERPL-MCNC: 19 MG/DL (ref 15–57)
CALCIUM SERPL-MCNC: 9.3 MG/DL (ref 8.6–10.2)
CHLORIDE SERPL-SCNC: 104 MMOL/L (ref 98–110)
CK SERPL-CCNC: 54 U/L (ref 20–239)
CO2 SERPL-SCNC: 25 MMOL/L (ref 20–32)
COLOR UR: YELLOW
CREAT SERPL-MCNC: 0.68 MG/DL (ref 0.5–0.97)
CREAT UR-MCNC: 82 MG/DL (ref 20–275)
CRP SERPL-MCNC: 4.8 MG/L
DSDNA AB SER-ACNC: 11 IU/ML
EGFRCR SERPLBLD CKD-EPI 2021: 114 ML/MIN/1.73M2
EOSINOPHIL # BLD AUTO: 9 CELLS/UL (ref 15–500)
EOSINOPHIL NFR BLD AUTO: 0.2 %
ERYTHROCYTE [DISTWIDTH] IN BLOOD BY AUTOMATED COUNT: 13.3 % (ref 11–15)
ERYTHROCYTE [SEDIMENTATION RATE] IN BLOOD BY WESTERGREN METHOD: 9 MM/H
GLUCOSE SERPL-MCNC: 75 MG/DL (ref 65–99)
GLUCOSE UR QL STRIP: NEGATIVE
HCT VFR BLD AUTO: 40.8 % (ref 35–45)
HGB BLD-MCNC: 12.8 G/DL (ref 11.7–15.5)
HGB UR QL STRIP: NEGATIVE
HYALINE CASTS #/AREA URNS LPF: NORMAL /LPF
KETONES UR QL STRIP: NEGATIVE
LEUKOCYTE ESTERASE UR QL STRIP: NEGATIVE
LYMPHOCYTES # BLD AUTO: 1766 CELLS/UL (ref 850–3900)
LYMPHOCYTES NFR BLD AUTO: 38.4 %
MCH RBC QN AUTO: 30 PG (ref 27–33)
MCHC RBC AUTO-ENTMCNC: 31.4 G/DL (ref 32–36)
MCV RBC AUTO: 95.6 FL (ref 80–100)
MICROALBUMIN UR-MCNC: <0.2 MG/DL
MONOCYTES # BLD AUTO: 460 CELLS/UL (ref 200–950)
MONOCYTES NFR BLD AUTO: 10 %
NEUTROPHILS # BLD AUTO: 2323 CELLS/UL (ref 1500–7800)
NEUTROPHILS NFR BLD AUTO: 50.5 %
NITRITE UR QL STRIP: NEGATIVE
PH UR STRIP: 5.5 [PH] (ref 5–8)
PLATELET # BLD AUTO: 273 THOUSAND/UL (ref 140–400)
PMV BLD REES-ECKER: 11.1 FL (ref 7.5–12.5)
POTASSIUM SERPL-SCNC: 4.2 MMOL/L (ref 3.5–5.3)
PROT SERPL-MCNC: 6.5 G/DL (ref 6.1–8.1)
PROT UR QL STRIP: NEGATIVE
RBC # BLD AUTO: 4.27 MILLION/UL (ref 3.8–5.1)
RBC #/AREA URNS HPF: NORMAL /HPF
SERVICE CMNT-IMP: NORMAL
SODIUM SERPL-SCNC: 138 MMOL/L (ref 135–146)
SP GR UR STRIP: 1.01 (ref 1–1.03)
SQUAMOUS #/AREA URNS HPF: NORMAL /HPF
WBC # BLD AUTO: 4.6 THOUSAND/UL (ref 3.8–10.8)
WBC #/AREA URNS HPF: NORMAL /HPF

## 2025-08-11 ENCOUNTER — SPECIALTY PHARMACY (OUTPATIENT)
Dept: PHARMACY | Facility: CLINIC | Age: 40
End: 2025-08-11

## 2025-08-15 ENCOUNTER — APPOINTMENT (OUTPATIENT)
Dept: RHEUMATOLOGY | Facility: CLINIC | Age: 40
End: 2025-08-15
Payer: COMMERCIAL

## 2025-08-15 ASSESSMENT — ROUTINE ASSESSMENT OF PATIENT INDEX DATA (RAPID3)
ON A SCALE OF ONE TO TEN, HOW MUCH PAIN HAVE YOU HAD BECAUSE OF YOUR CONDITION OVER THE PAST WEEK?: 8
IN_OUT_BED: WITHOUT ANY DIFFICULTY
FEELINGS_ANXIETY_NERVOUS: WITH SOME DIFFICULTY
SUM OF QUESTIONS A TO J: 2
WASH_DRY_BODY: WITHOUT ANY DIFFICULTY
LIFT_CUP_TO_MOUTH: WITHOUT ANY DIFFICULTY
ON A SCALE OF ONE TO TEN, HOW MUCH PAIN HAVE YOU HAD BECAUSE OF YOUR CONDITION OVER THE PAST WEEK?: 8
GOOD_NIGHTS_SLEEP: WITH SOME DIFFICULTY
SEVERITY_SCORE: HIGH SEVERITY (HS)
WEIGHTED_TOTAL_SCORE: 4.9
TOTAL RAPID3 SCORE: 14.7
PARTIPATE_RECREATIONAL_ACTIVITIES: WITH SOME DIFFICULTY
PICK_CLOTHES_OFF_FLOOR: WITHOUT ANY DIFFICULTY
DRESS_YOURSELF: WITHOUT ANY DIFFICULTY
FN_SCORE: 0.7
ON A SCALE OF ONE TO TEN, CONSIDERING ALL THE WAYS IN WHICH ILLNESS AND HEALTH CONDITIONS MAY AFFECT YOU AT THIS TIME, PLEASE INDICATE BELOW HOW YOU ARE DOING:: 6
FEELINGS_DEPRESSION: WITH SOME DIFFICULTY
SEVERITY_SCORE: 0
TURN_FAUCETS_OFF: WITHOUT ANY DIFFICULTY
WALK_FLAT_GROUND: WITHOUT ANY DIFFICULTY
IN_OUT_TRANSPORT: WITHOUT ANY DIFFICULTY
WALK_KILOMETERS: WITH SOME DIFFICULTY
ON A SCALE OF ONE TO TEN, CONSIDERING ALL THE WAYS IN WHICH ILLNESS AND HEALTH CONDITIONS MAY AFFECT YOU AT THIS TIME, PLEASE INDICATE BELOW HOW YOU ARE DOING:: 6

## 2025-08-15 ASSESSMENT — COLUMBIA-SUICIDE SEVERITY RATING SCALE - C-SSRS
1. IN THE PAST MONTH, HAVE YOU WISHED YOU WERE DEAD OR WISHED YOU COULD GO TO SLEEP AND NOT WAKE UP?: NO
2. HAVE YOU ACTUALLY HAD ANY THOUGHTS OF KILLING YOURSELF?: NO
6. HAVE YOU EVER DONE ANYTHING, STARTED TO DO ANYTHING, OR PREPARED TO DO ANYTHING TO END YOUR LIFE?: NO

## 2025-08-15 ASSESSMENT — PAIN SCALES - GENERAL: PAINLEVEL_OUTOF10: 8

## 2025-08-22 PROCEDURE — RXMED WILLOW AMBULATORY MEDICATION CHARGE

## 2025-08-27 ENCOUNTER — PHARMACY VISIT (OUTPATIENT)
Dept: PHARMACY | Facility: CLINIC | Age: 40
End: 2025-08-27
Payer: COMMERCIAL

## 2025-08-28 ENCOUNTER — TELEPHONE (OUTPATIENT)
Facility: CLINIC | Age: 40
End: 2025-08-28
Payer: COMMERCIAL

## (undated) DEVICE — DRAPE PACK, LAPAROSCOPY ASC, CUSTOM, PARMA

## (undated) DEVICE — SLEEVE, VASO PRESS, CALF GARMENT, MEDIUM, GREEN

## (undated) DEVICE — FORCEPS, BIPOLAR MARYLAND, DAVINCI XI

## (undated) DEVICE — SCISSORS, MONOPOLAR, CURVED, 8MM

## (undated) DEVICE — SUTURE, STRATAFIX, SPIRAL PDS PLUS, 2-0, 6IN, 15CM, CT-2, VIOLET

## (undated) DEVICE — SYRINGE, 20 CC, LUER SLIP

## (undated) DEVICE — COVER, TABLE, 54X90

## (undated) DEVICE — CATHETER TRAY, SURESTEP, 16FR, URINE METER W/STATLOCK

## (undated) DEVICE — IRRIGATION SET, CYSTOSCOPY, REGULATING CLAMP, STRAIGHT, 81 IN

## (undated) DEVICE — LUBRICANT, ELECTROLUBE, F/ELECTRODE TIPS

## (undated) DEVICE — MANIPULATOR, INJECTOR, UTERINE, HUMI, KRONNER, 5 MM, 33 CM

## (undated) DEVICE — DRAPE, SURGICAL, OB/GYN

## (undated) DEVICE — OBTURATOR, BLADELESS , SU

## (undated) DEVICE — NEEDLE DRIVER, MEGA SUTURECUT, FORCE FEEDBACK

## (undated) DEVICE — CLEAN KIT, ANTIFOG SCOPE, SEE SHARP 150MM

## (undated) DEVICE — DRAPE, ARM XI

## (undated) DEVICE — TROCAR SYSTEM, BALLOON, KII GELPORT, 12 X 100MM

## (undated) DEVICE — DRAPE, SHEET, THREE QUARTER, FAN FOLD, 57 X 77 IN

## (undated) DEVICE — PITCHER, GRADUATE, 32 OZ (1200CC), STERILE

## (undated) DEVICE — IRRIGATION SET, CYSTOSCOPY, TURP, Y, CONTINUOUS, 81 IN

## (undated) DEVICE — ACCESS PORT, 8MM, 120MM LENGTH LOW PRO W/BLADELESS OPTICAL TIP

## (undated) DEVICE — TUBING SET, TRI-LUMEN, FILTERED, F/AIRSEAL

## (undated) DEVICE — SEAL, UNIVERSAL, 5-12MM

## (undated) DEVICE — NEEDLE, HYPODERMIC, SAFETYGLIDE, SHIELDING, REGULAR WALL, REGULAR BEVEL, 22 G X 1.5 IN, BLACK HUB

## (undated) DEVICE — APPLICATOR, CHLORAPREP, W/ORANGE TINT, 26ML

## (undated) DEVICE — GOWN, ASTOUND, XL

## (undated) DEVICE — SYRINGE, 60 CC, IRRIGATION, PISTON, CATH TIP, W/LUER ADAPTER,DISP

## (undated) DEVICE — SYRINGE, 30 CC, LUER SLIP TIP

## (undated) DEVICE — SYRINGE, 50 CC, LUER LOCK

## (undated) DEVICE — COVER, TIP HOT SHEARS ENDOWRIST

## (undated) DEVICE — DRAPE, COLUMN, DAVINCI XI

## (undated) DEVICE — ASSEMBLY, STRYKER FLOW 2, SUCTION IRRIGATOR, WITH TIP